# Patient Record
Sex: MALE | Race: WHITE | NOT HISPANIC OR LATINO | Employment: OTHER | URBAN - METROPOLITAN AREA
[De-identification: names, ages, dates, MRNs, and addresses within clinical notes are randomized per-mention and may not be internally consistent; named-entity substitution may affect disease eponyms.]

---

## 2023-09-06 PROBLEM — M47.817 LUMBOSACRAL SPONDYLOSIS: Status: ACTIVE | Noted: 2023-09-06

## 2023-09-06 PROBLEM — H92.13 OTORRHEA OF BOTH EARS: Status: ACTIVE | Noted: 2023-09-06

## 2023-09-06 PROBLEM — I35.0 AORTIC STENOSIS: Status: ACTIVE | Noted: 2022-03-02

## 2023-09-06 PROBLEM — R05.9 COUGH: Status: ACTIVE | Noted: 2023-09-06

## 2023-09-06 PROBLEM — N52.9 INABILITY TO ATTAIN ERECTION: Status: ACTIVE | Noted: 2023-09-06

## 2023-09-06 PROBLEM — M79.606 PAIN OF LOWER EXTREMITY: Status: ACTIVE | Noted: 2023-09-06

## 2023-09-06 PROBLEM — E78.5 HYPERLIPIDEMIA: Status: ACTIVE | Noted: 2023-09-06

## 2023-09-06 PROBLEM — M65.30 TRIGGER FINGER: Status: ACTIVE | Noted: 2021-03-01

## 2023-09-06 PROBLEM — H93.13 BILATERAL TINNITUS: Status: ACTIVE | Noted: 2023-09-06

## 2023-09-06 PROBLEM — E11.9 TYPE 2 DIABETES MELLITUS WITHOUT COMPLICATIONS (MULTI): Status: ACTIVE | Noted: 2018-12-03

## 2023-09-06 PROBLEM — H71.21: Status: ACTIVE | Noted: 2023-09-06

## 2023-09-06 PROBLEM — N40.1 BENIGN PROSTATIC HYPERPLASIA WITH INCOMPLETE BLADDER EMPTYING: Status: ACTIVE | Noted: 2023-09-06

## 2023-09-06 PROBLEM — R39.14 BENIGN PROSTATIC HYPERPLASIA WITH INCOMPLETE BLADDER EMPTYING: Status: ACTIVE | Noted: 2023-09-06

## 2023-09-06 PROBLEM — R06.00 DYSPNEA: Status: ACTIVE | Noted: 2023-09-06

## 2023-09-06 PROBLEM — E78.00 PURE HYPERCHOLESTEROLEMIA, UNSPECIFIED: Status: ACTIVE | Noted: 2018-06-04

## 2023-09-06 PROBLEM — J20.9 ACUTE BRONCHITIS: Status: ACTIVE | Noted: 2023-09-06

## 2023-09-06 PROBLEM — B36.9 OTOMYCOSIS OF LEFT EAR: Status: ACTIVE | Noted: 2023-09-06

## 2023-09-06 PROBLEM — R53.1 ASTHENIA: Status: ACTIVE | Noted: 2023-09-06

## 2023-09-06 PROBLEM — G89.29 OTHER CHRONIC PAIN: Status: ACTIVE | Noted: 2023-09-06

## 2023-09-06 PROBLEM — T81.9XXA COMPLICATION OF SURGICAL PROCEDURE: Status: ACTIVE | Noted: 2023-09-06

## 2023-09-06 PROBLEM — J31.0 CHRONIC RHINITIS: Status: ACTIVE | Noted: 2018-12-04

## 2023-09-06 PROBLEM — H62.42 OTOMYCOSIS OF LEFT EAR: Status: ACTIVE | Noted: 2023-09-06

## 2023-09-06 PROBLEM — H66.92 LEFT CHRONIC OTITIS MEDIA: Status: ACTIVE | Noted: 2023-09-06

## 2023-09-06 PROBLEM — I10 ESSENTIAL (PRIMARY) HYPERTENSION: Status: ACTIVE | Noted: 2018-12-03

## 2023-09-06 PROBLEM — R42 DIZZINESS: Status: ACTIVE | Noted: 2023-09-06

## 2023-09-06 PROBLEM — I25.10 CAD IN NATIVE ARTERY: Status: ACTIVE | Noted: 2018-06-05

## 2023-09-06 PROBLEM — E11.9 DIABETES (MULTI): Status: ACTIVE | Noted: 2018-06-05

## 2023-09-06 PROBLEM — T16.2XXA EAR FOREIGN BODY, LEFT, INITIAL ENCOUNTER: Status: ACTIVE | Noted: 2023-09-06

## 2023-09-06 PROBLEM — I65.29 CAROTID ARTERY CALCIFICATION: Status: ACTIVE | Noted: 2023-09-06

## 2023-09-06 PROBLEM — R94.39 ABNORMAL STRESS TEST: Status: ACTIVE | Noted: 2023-09-06

## 2023-09-06 PROBLEM — H90.6 MIXED CONDUCTIVE AND SENSORINEURAL HEARING LOSS OF BOTH EARS: Status: ACTIVE | Noted: 2023-09-06

## 2023-09-06 PROBLEM — E53.8 VITAMIN B12 DEFICIENCY: Status: ACTIVE | Noted: 2023-09-06

## 2023-09-06 PROBLEM — R50.9 FEVER: Status: ACTIVE | Noted: 2023-09-06

## 2023-09-06 PROBLEM — M1A.9XX0 CHRONIC GOUT, UNSPECIFIED, WITHOUT TOPHUS (TOPHI): Status: ACTIVE | Noted: 2018-06-05

## 2023-09-06 PROBLEM — I73.9 PERIPHERAL ARTERIAL DISEASE (CMS-HCC): Status: ACTIVE | Noted: 2023-09-06

## 2023-09-06 PROBLEM — H70.10 CHRONIC MASTOIDITIS: Status: ACTIVE | Noted: 2023-09-06

## 2023-09-06 PROBLEM — M79.604 BILATERAL LEG PAIN: Status: ACTIVE | Noted: 2020-01-07

## 2023-09-06 PROBLEM — R06.02 EXERTIONAL SHORTNESS OF BREATH: Status: ACTIVE | Noted: 2021-05-11

## 2023-09-06 PROBLEM — I20.9: Status: ACTIVE | Noted: 2023-09-06

## 2023-09-06 PROBLEM — M25.529 PAIN IN ELBOW: Status: ACTIVE | Noted: 2023-09-06

## 2023-09-06 PROBLEM — E66.9 OBESITY: Status: ACTIVE | Noted: 2019-06-18

## 2023-09-06 PROBLEM — H66.91 RIGHT CHRONIC OTITIS MEDIA: Status: ACTIVE | Noted: 2023-09-06

## 2023-09-06 PROBLEM — R20.0 NUMBNESS: Status: ACTIVE | Noted: 2023-09-06

## 2023-09-06 PROBLEM — I73.9 CLAUDICATION (CMS-HCC): Status: ACTIVE | Noted: 2023-09-06

## 2023-09-06 PROBLEM — R35.0 URINARY FREQUENCY: Status: ACTIVE | Noted: 2023-09-06

## 2023-09-06 PROBLEM — M54.10 ACUTE LOW BACK PAIN WITH RADICULAR SYMPTOMS, DURATION LESS THAN 6 WEEKS: Status: ACTIVE | Noted: 2020-01-21

## 2023-09-06 PROBLEM — M79.605 BILATERAL LEG PAIN: Status: ACTIVE | Noted: 2020-01-07

## 2023-09-06 PROBLEM — D17.0 LIPOMA OF NECK: Status: ACTIVE | Noted: 2023-09-06

## 2023-09-06 PROBLEM — J30.2 SEASONAL ALLERGIC RHINITIS: Status: ACTIVE | Noted: 2021-09-02

## 2023-09-06 PROBLEM — G62.9 PERIPHERAL NEUROPATHY: Status: ACTIVE | Noted: 2021-05-11

## 2023-09-06 PROBLEM — J11.1 INFLUENZA-LIKE ILLNESS: Status: ACTIVE | Noted: 2023-09-06

## 2023-09-06 RX ORDER — ISOSORBIDE MONONITRATE 30 MG/1
1 TABLET, EXTENDED RELEASE ORAL DAILY
COMMUNITY
Start: 2022-06-13 | End: 2024-01-08 | Stop reason: SDUPTHER

## 2023-09-06 RX ORDER — AMLODIPINE BESYLATE 10 MG/1
10 TABLET ORAL DAILY
COMMUNITY
Start: 2015-03-26 | End: 2023-12-12 | Stop reason: SDUPTHER

## 2023-09-06 RX ORDER — IBUPROFEN 200 MG
TABLET ORAL
COMMUNITY

## 2023-09-06 RX ORDER — TAMSULOSIN HYDROCHLORIDE 0.4 MG/1
0.4 CAPSULE ORAL DAILY
COMMUNITY
Start: 2018-06-05

## 2023-09-06 RX ORDER — ASPIRIN 81 MG/1
81 TABLET ORAL DAILY
COMMUNITY
Start: 2015-01-26

## 2023-09-06 RX ORDER — NAPROXEN SODIUM 220 MG
220 TABLET ORAL
COMMUNITY

## 2023-09-06 RX ORDER — ACETAMINOPHEN 325 MG/1
1-2 TABLET ORAL
COMMUNITY
Start: 2020-12-02

## 2023-09-06 RX ORDER — METOPROLOL SUCCINATE 100 MG/1
100 TABLET, EXTENDED RELEASE ORAL DAILY
COMMUNITY
Start: 2018-02-05 | End: 2024-01-08 | Stop reason: SDUPTHER

## 2023-09-06 RX ORDER — CLOPIDOGREL BISULFATE 75 MG/1
75 TABLET ORAL DAILY
COMMUNITY
Start: 2018-06-05 | End: 2023-12-13 | Stop reason: SDUPTHER

## 2023-09-06 RX ORDER — ALLOPURINOL 300 MG/1
300 TABLET ORAL DAILY
COMMUNITY
Start: 2020-07-30 | End: 2023-10-23 | Stop reason: SDUPTHER

## 2023-09-06 RX ORDER — GABAPENTIN 600 MG/1
TABLET ORAL
COMMUNITY
Start: 2017-06-12 | End: 2023-11-29

## 2023-09-06 RX ORDER — PSYLLIUM HUSK 3.4 G/5.8G
2 POWDER ORAL
COMMUNITY
Start: 2016-02-01

## 2023-09-06 RX ORDER — GLUCOSAM/CHONDRO/HERB 149/HYAL 750-100 MG
1 TABLET ORAL DAILY
COMMUNITY

## 2023-09-06 RX ORDER — HYDROCORTISONE 1 G/100G
CREAM TOPICAL
COMMUNITY
Start: 2019-08-28

## 2023-09-06 RX ORDER — LOVASTATIN 40 MG/1
40 TABLET ORAL 2 TIMES DAILY
COMMUNITY
End: 2023-10-23 | Stop reason: SDUPTHER

## 2023-09-06 RX ORDER — EVOLOCUMAB 140 MG/ML
140 INJECTION, SOLUTION SUBCUTANEOUS
COMMUNITY
End: 2024-01-08 | Stop reason: SDUPTHER

## 2023-09-06 RX ORDER — GABAPENTIN 300 MG/1
300 CAPSULE ORAL 3 TIMES DAILY
COMMUNITY
End: 2023-10-02

## 2023-09-06 RX ORDER — DOCUSATE SODIUM 100 MG/1
100 CAPSULE, LIQUID FILLED ORAL DAILY
COMMUNITY

## 2023-09-06 RX ORDER — ISOSORBIDE MONONITRATE 60 MG/1
60 TABLET, EXTENDED RELEASE ORAL DAILY
COMMUNITY
Start: 2020-12-02 | End: 2024-01-08 | Stop reason: SDUPTHER

## 2023-09-25 ENCOUNTER — HOSPITAL ENCOUNTER (OUTPATIENT)
Dept: DATA CONVERSION | Facility: HOSPITAL | Age: 83
Discharge: HOME | End: 2023-09-25
Payer: MEDICARE

## 2023-09-25 DIAGNOSIS — E11.9 TYPE 2 DIABETES MELLITUS WITHOUT COMPLICATIONS (MULTI): ICD-10-CM

## 2023-09-25 DIAGNOSIS — E78.00 PURE HYPERCHOLESTEROLEMIA, UNSPECIFIED: ICD-10-CM

## 2023-09-25 DIAGNOSIS — I10 ESSENTIAL (PRIMARY) HYPERTENSION: ICD-10-CM

## 2023-09-25 LAB
ALBUMIN SERPL-MCNC: 4.2 GM/DL (ref 3.5–5)
ALBUMIN/GLOB SERPL: 1.4 RATIO (ref 1.5–3)
ALP BLD-CCNC: 69 U/L (ref 35–125)
ALT SERPL-CCNC: 12 U/L (ref 5–40)
ANION GAP SERPL CALCULATED.3IONS-SCNC: 13 MMOL/L (ref 0–19)
APPEARANCE PLAS: ABNORMAL
AST SERPL-CCNC: 18 U/L (ref 5–40)
BASOPHILS # BLD AUTO: 0.06 K/UL (ref 0–0.22)
BASOPHILS NFR BLD AUTO: 0.8 % (ref 0–1)
BILIRUB SERPL-MCNC: 0.3 MG/DL (ref 0.1–1.2)
BILIRUB UR QL STRIP.AUTO: NEGATIVE
BUN SERPL-MCNC: 15 MG/DL (ref 8–25)
BUN/CREAT SERPL: 13.6 RATIO (ref 8–21)
CALCIUM SERPL-MCNC: 9.5 MG/DL (ref 8.5–10.4)
CHLORIDE SERPL-SCNC: 101 MMOL/L (ref 97–107)
CHOLEST SERPL-MCNC: 91 MG/DL (ref 133–200)
CHOLEST/HDLC SERPL: 1.8 RATIO
CLARITY UR: CLEAR
CO2 SERPL-SCNC: 26 MMOL/L (ref 24–31)
COLOR SPUN FLD: ABNORMAL
COLOR UR: COLORLESS
CREAT SERPL-MCNC: 1.1 MG/DL (ref 0.4–1.6)
CREAT UR-MCNC: 38 MG/DL
DEPRECATED RDW RBC AUTO: 52.8 FL (ref 37–54)
DIFFERENTIAL METHOD BLD: ABNORMAL
EOSINOPHIL # BLD AUTO: 0.22 K/UL (ref 0–0.45)
EOSINOPHIL NFR BLD: 2.9 % (ref 0–3)
ERYTHROCYTE [DISTWIDTH] IN BLOOD BY AUTOMATED COUNT: 14.1 % (ref 11.7–15)
FASTING STATUS PATIENT QL REPORTED: ABNORMAL
GFR SERPL CREATININE-BSD FRML MDRD: 67 ML/MIN/1.73 M2
GLOBULIN SER-MCNC: 3 G/DL (ref 1.9–3.7)
GLUCOSE SERPL-MCNC: 95 MG/DL (ref 65–99)
GLUCOSE UR STRIP.AUTO-MCNC: NEGATIVE MG/DL
HBA1C MFR BLD: 5.7 % (ref 4–6)
HCT VFR BLD AUTO: 38.4 % (ref 41–50)
HDLC SERPL-MCNC: 51 MG/DL
HGB BLD-MCNC: 12.5 GM/DL (ref 13.5–16.5)
HGB UR QL: NEGATIVE
IMM GRANULOCYTES # BLD AUTO: 0.06 K/UL (ref 0–0.1)
KETONES UR QL STRIP.AUTO: NEGATIVE
LDLC SERPL CALC-MCNC: 4 MG/DL (ref 65–130)
LEUKOCYTE ESTERASE UR QL STRIP.AUTO: NEGATIVE
LYMPHOCYTES # BLD AUTO: 1.71 K/UL (ref 1.2–3.2)
LYMPHOCYTES NFR BLD MANUAL: 22.4 % (ref 20–40)
MCH RBC QN AUTO: 33.1 PG (ref 26–34)
MCHC RBC AUTO-ENTMCNC: 32.6 % (ref 31–37)
MCV RBC AUTO: 101.6 FL (ref 80–100)
MICROALBUMIN UR-MCNC: 12 MG/L (ref 0–23)
MICROALBUMIN/CREAT UR: 31.6 MG/G (ref 0–30)
MONOCYTES # BLD AUTO: 0.78 K/UL (ref 0–0.8)
MONOCYTES NFR BLD MANUAL: 10.2 % (ref 0–8)
NEUTROPHILS # BLD AUTO: 4.8 K/UL
NEUTROPHILS # BLD AUTO: 4.8 K/UL (ref 1.8–7.7)
NEUTROPHILS.IMMATURE NFR BLD: 0.8 % (ref 0–1)
NEUTS SEG NFR BLD: 62.9 % (ref 50–70)
NITRITE UR QL STRIP.AUTO: NEGATIVE
NRBC BLD-RTO: 0 /100 WBC
PH UR STRIP.AUTO: 7.5 [PH] (ref 4.6–8)
PLATELET # BLD AUTO: 282 K/UL (ref 150–450)
PMV BLD AUTO: 10.6 CU (ref 7–12.6)
POTASSIUM SERPL-SCNC: 4.6 MMOL/L (ref 3.4–5.1)
PROT SERPL-MCNC: 7.2 G/DL (ref 5.9–7.9)
PROT UR STRIP.AUTO-MCNC: NEGATIVE MG/DL
RBC # BLD AUTO: 3.78 M/UL (ref 4.5–5.5)
REFLEX MICROSCOPIC (UA): NORMAL
SODIUM SERPL-SCNC: 140 MMOL/L (ref 133–145)
SP GR UR STRIP.AUTO: 1.01 (ref 1–1.03)
TRIGL SERPL-MCNC: 179 MG/DL (ref 40–150)
UROBILINOGEN UR QL STRIP.AUTO: NORMAL MG/DL (ref 0–1)
WBC # BLD AUTO: 7.6 K/UL (ref 4.5–11)

## 2023-10-01 PROBLEM — R53.1 ASTHENIA: Status: RESOLVED | Noted: 2023-09-06 | Resolved: 2023-10-01

## 2023-10-01 PROBLEM — J20.9 ACUTE BRONCHITIS: Status: RESOLVED | Noted: 2023-09-06 | Resolved: 2023-10-01

## 2023-10-01 PROBLEM — M54.10 ACUTE LOW BACK PAIN WITH RADICULAR SYMPTOMS, DURATION LESS THAN 6 WEEKS: Status: RESOLVED | Noted: 2020-01-21 | Resolved: 2023-10-01

## 2023-10-02 ENCOUNTER — OFFICE VISIT (OUTPATIENT)
Dept: PRIMARY CARE | Facility: CLINIC | Age: 83
End: 2023-10-02
Payer: MEDICARE

## 2023-10-02 ENCOUNTER — TELEPHONE (OUTPATIENT)
Dept: PRIMARY CARE | Facility: CLINIC | Age: 83
End: 2023-10-02

## 2023-10-02 VITALS
DIASTOLIC BLOOD PRESSURE: 58 MMHG | HEART RATE: 65 BPM | BODY MASS INDEX: 30.53 KG/M2 | HEIGHT: 66 IN | SYSTOLIC BLOOD PRESSURE: 130 MMHG | OXYGEN SATURATION: 96 % | WEIGHT: 190 LBS | RESPIRATION RATE: 18 BRPM

## 2023-10-02 DIAGNOSIS — E78.00 PURE HYPERCHOLESTEROLEMIA, UNSPECIFIED: ICD-10-CM

## 2023-10-02 DIAGNOSIS — I10 ESSENTIAL (PRIMARY) HYPERTENSION: ICD-10-CM

## 2023-10-02 DIAGNOSIS — E11.59 TYPE 2 DIABETES MELLITUS WITH OTHER CIRCULATORY COMPLICATION, WITHOUT LONG-TERM CURRENT USE OF INSULIN (MULTI): ICD-10-CM

## 2023-10-02 DIAGNOSIS — E11.59 TYPE 2 DIABETES MELLITUS WITH OTHER CIRCULATORY COMPLICATION, WITHOUT LONG-TERM CURRENT USE OF INSULIN (MULTI): Primary | ICD-10-CM

## 2023-10-02 DIAGNOSIS — R39.14 BENIGN PROSTATIC HYPERPLASIA WITH INCOMPLETE BLADDER EMPTYING: ICD-10-CM

## 2023-10-02 DIAGNOSIS — N40.1 BENIGN PROSTATIC HYPERPLASIA WITH INCOMPLETE BLADDER EMPTYING: ICD-10-CM

## 2023-10-02 DIAGNOSIS — I25.10 CAD IN NATIVE ARTERY: Primary | ICD-10-CM

## 2023-10-02 DIAGNOSIS — M1A.9XX0 CHRONIC GOUT WITHOUT TOPHUS, UNSPECIFIED CAUSE, UNSPECIFIED SITE: ICD-10-CM

## 2023-10-02 PROCEDURE — 3078F DIAST BP <80 MM HG: CPT | Performed by: FAMILY MEDICINE

## 2023-10-02 PROCEDURE — 1159F MED LIST DOCD IN RCRD: CPT | Performed by: FAMILY MEDICINE

## 2023-10-02 PROCEDURE — G0439 PPPS, SUBSEQ VISIT: HCPCS | Performed by: FAMILY MEDICINE

## 2023-10-02 PROCEDURE — 1126F AMNT PAIN NOTED NONE PRSNT: CPT | Performed by: FAMILY MEDICINE

## 2023-10-02 PROCEDURE — 1036F TOBACCO NON-USER: CPT | Performed by: FAMILY MEDICINE

## 2023-10-02 PROCEDURE — 3075F SYST BP GE 130 - 139MM HG: CPT | Performed by: FAMILY MEDICINE

## 2023-10-02 ASSESSMENT — ENCOUNTER SYMPTOMS
OCCASIONAL FEELINGS OF UNSTEADINESS: 0
DEPRESSION: 0
LOSS OF SENSATION IN FEET: 0

## 2023-10-02 ASSESSMENT — PROMIS GLOBAL HEALTH SCALE
RATE_MENTAL_HEALTH: VERY GOOD
RATE_PHYSICAL_HEALTH: GOOD
EMOTIONAL_PROBLEMS: NEVER
RATE_SOCIAL_SATISFACTION: VERY GOOD
RATE_QUALITY_OF_LIFE: GOOD
CARRYOUT_PHYSICAL_ACTIVITIES: MODERATELY
RATE_GENERAL_HEALTH: GOOD
RATE_AVERAGE_PAIN: 0
RATE_AVERAGE_FATIGUE: MODERATE
CARRYOUT_SOCIAL_ACTIVITIES: GOOD

## 2023-10-02 ASSESSMENT — PAIN SCALES - GENERAL: PAINLEVEL: 0-NO PAIN

## 2023-10-02 ASSESSMENT — PATIENT HEALTH QUESTIONNAIRE - PHQ9
1. LITTLE INTEREST OR PLEASURE IN DOING THINGS: NOT AT ALL
2. FEELING DOWN, DEPRESSED OR HOPELESS: NOT AT ALL
SUM OF ALL RESPONSES TO PHQ9 QUESTIONS 1 AND 2: 0

## 2023-10-02 NOTE — PROGRESS NOTES
"History of Present Illness  1. Presents for Annual Wellness Visit. PMHx, FMHx, SHx, and Social history reviewed and updated in chart. PHQ2 reviewed. Mini-cog test reviewed. Advanced Care planning reviewed.  Self assessment of Health - good  Issues with ADLs - 0  Issues with IADLs - 0  Issues with home safety - 0  He had a colonoscopy/EGD by Dr. Tate in 10/16 which showed diverticulosis and a polyp.   Gastrointestinal consultation in 2021 recommended no further colonoscopy screening exams.     2. FOX is seen today for follow-up of coronary artery disease.  He is S/P CABG on 3/20/15. He is followed by Dr. Yeboah at [facility]. He is on Plavix, ASA, Metoprolol, lisinopril and Amlodipine. Also has aortic stenosis which is being followed by serial ultrasounds by the cardiologist.     3. FOX is seen today for gout.  He takes Allopurinol and has had no acute attacks in a long time.     4. FOX is seen today also for follow up of High cholesterol.  He was on Lovastatin 40 mg but it was changed to Repatha by cardiologist in 2020.Recent LDL is 10.     5. FOX is here also for follow up of benign essential hypertension.  On meds through Dr. Yeboah.     6. FOX is seen today also for BPH.  He is currently being followed by Dr. Lincoln.     7. FOX presents today also for follow up of back pain.  He is S/P surgical repair of spinal stenosis L3-L5 by dr. Mahan in 12/14 and again in 8/20 at [place].     8. FOX is seen also for follow up of Diabetes 2, non insulin requiring.  He is on no medication. Recent A1C is 5.7  Subjective   Patient ID: Fox Devlin is a 83 y.o. male who presents for Annual Exam (Annual wellness exam).    HPI     Review of Systems    Objective   /58 (BP Location: Left arm, Patient Position: Sitting, BP Cuff Size: Large adult)   Pulse 65   Resp 18   Ht 1.676 m (5' 6\")   Wt 86.2 kg (190 lb)   SpO2 96%   BMI 30.67 kg/m²     Physical Exam    Assessment/Plan   Problem " List Items Addressed This Visit             ICD-10-CM       High    Benign prostatic hyperplasia with incomplete bladder emptying N40.1, R39.14     Continue following with urologist.         CAD in native artery - Primary I25.10     Continue current medication and following with Dr. Yeboah.  Recheck here in 6 months.         Diabetes (CMS/HCC) E11.9    Essential (primary) hypertension I10     Continue current medications.  Recheck in 6 months.          Chronic gout, unspecified, without tophus (tophi) M1A.9XX0     Continue current medication.  Follow-up here in 6 months.         Pure hypercholesterolemia, unspecified E78.00     Continuing following with cardiologist and continuing Repatha.             .     Review of Systems  Constitutional Symptoms:  He is negative for fever, headaches, fatigue.   Eyes:  He is negative for loss and blurring of vision.   Ear, Nose, Mouth, Throat:  He is negative for hearing loss, sore throat.   Cardiovascular:  He is negative for chest pain/pressure.   Respiratory:  He is negative for shortness of breath.   Gastrointestinal:  He is negative for nausea, abdominal pain, change in bowel habits, abnormal stools, blood in stool.   Male Genitourinary:  He is negative for frequency, dysuria.   Musculoskeletal:  He is negative for joint pain.   Integumentary:  He is negative for skin trouble or rash.   Neurological:  He is negative for headache.    [1] [1] [1] [1]  Physical Exam  Vitals & Measurements  HR: 77 (Peripheral)  BP: 142/72  SpO2: 100%   HT: 65 in  WT: 188 lb  BMI: 31.28   FOX's hair pattern is normal for patients age and The scalp is normal . The skull is normocephalic, atraumatic. The face is unremarkable with no facial droop.  Eyes: PERRLA, EOMI, no scleral icterus.  Ears, Nose, Mouth, Throat: Both canals are normal.  Tympanic membranes are pearly gray, normal landmarks, good light reflex.  Neck: Inspection of the neck reveals no enlarged nodes, no masses, no JVD,  euthyroid and symmetric, trachea is midline, no mass or JVD . Palpation shows normal pulsation, no adenopathy, or mass.   Carotid auscultation reveals No murmur appreciated with No carotid bruit present.  Chest: Lungs are clear to auscultation and percussion, no respiratory distress.  Cardiovascular: Heart is regular in rhythm. No murmurs or gallops DP pulses are present. Extremities: No edema, clubbing or cyanosis.  Abdomen: Bowel sounds normal, non-tender no mass or organomegaly, no bruit.  Genitourinary: Genital exam: Penis is normal.  Scrotum is normal.  There are no hernias palpable. The prostate is normal with regards to size and consistency.  Lymph Nodes: Palpation of the cervical area are within normal limit.  Musculoskeletal: Extremities: No deformity. No cyanosis, clubbing or edema. Inspection of the lumbar spine reveals normal lumbar lordosis. Left shoulder has no obvious deformity. Palpation reveals No palpable tenderness and Range of motion limited due to pain . Shoulder has no joint instability.  Pain with abduction and extension. Both feet have no evidence of lesions and nails are in good repair. Motor examination reveals normal tone and strength.  Skin: Skin has good turgor.  Neurological: Awake, alert and oriented X3. Cranial nerves II - XII are grossly intact.    [2] [2] [2]  Assessment/Plan  1. Encounter for general adult medical examination without abnormal findings (Z00.00)  Anticipatory guidance given.  He is scheduled get his flu shot next week at [facility].  2. Essential (primary) hypertension (I10)    Continue current medication and following with cardiologist.  3. Pure hypercholesterolemia, unspecified (E78.00)  Continue current medication.  Recheck in 6 months.  4. Type 2 diabetes mellitus without complications (E11.9)    Keep off medication.  Continue low-carbohydrate diet.  Recheck A1 c in 6 months at [facility].  5. Aortic stenosis (I35.0)    Continue follow-up with cardiologist.  6. CAD in native  artery (I25.10)    Continue to follow with cardiologist.

## 2023-10-18 ENCOUNTER — SPECIALTY PHARMACY (OUTPATIENT)
Dept: PHARMACY | Facility: CLINIC | Age: 83
End: 2023-10-18

## 2023-10-23 ENCOUNTER — TELEPHONE (OUTPATIENT)
Dept: PRIMARY CARE | Facility: CLINIC | Age: 83
End: 2023-10-23
Payer: MEDICARE

## 2023-10-23 ENCOUNTER — SPECIALTY PHARMACY (OUTPATIENT)
Dept: PHARMACY | Facility: CLINIC | Age: 83
End: 2023-10-23

## 2023-10-23 ENCOUNTER — PHARMACY VISIT (OUTPATIENT)
Dept: PHARMACY | Facility: CLINIC | Age: 83
End: 2023-10-23
Payer: COMMERCIAL

## 2023-10-23 DIAGNOSIS — M1A.9XX0 CHRONIC GOUT WITHOUT TOPHUS, UNSPECIFIED CAUSE, UNSPECIFIED SITE: ICD-10-CM

## 2023-10-23 DIAGNOSIS — E78.5 HYPERLIPIDEMIA, UNSPECIFIED HYPERLIPIDEMIA TYPE: ICD-10-CM

## 2023-10-23 PROCEDURE — RXMED WILLOW AMBULATORY MEDICATION CHARGE

## 2023-10-23 RX ORDER — ALLOPURINOL 300 MG/1
300 TABLET ORAL DAILY
Qty: 90 TABLET | Refills: 3 | Status: SHIPPED
Start: 2023-10-23 | End: 2024-01-08 | Stop reason: SDUPTHER

## 2023-10-23 RX ORDER — LOVASTATIN 40 MG/1
40 TABLET ORAL 2 TIMES DAILY
Qty: 180 TABLET | Refills: 0 | Status: SHIPPED | OUTPATIENT
Start: 2023-10-23 | End: 2023-10-27 | Stop reason: SDUPTHER

## 2023-10-27 ENCOUNTER — HOSPITAL ENCOUNTER (OUTPATIENT)
Dept: RADIOLOGY | Facility: HOSPITAL | Age: 83
Discharge: HOME | End: 2023-10-27
Payer: MEDICARE

## 2023-10-27 DIAGNOSIS — E78.5 HYPERLIPIDEMIA, UNSPECIFIED HYPERLIPIDEMIA TYPE: Primary | ICD-10-CM

## 2023-10-27 DIAGNOSIS — R91.8 OTHER NONSPECIFIC ABNORMAL FINDING OF LUNG FIELD: ICD-10-CM

## 2023-10-27 PROCEDURE — 71250 CT THORAX DX C-: CPT

## 2023-10-28 RX ORDER — LOVASTATIN 40 MG/1
40 TABLET ORAL 2 TIMES DAILY
Qty: 180 TABLET | Refills: 3 | Status: SHIPPED
Start: 2023-10-28 | End: 2024-01-08 | Stop reason: SDUPTHER

## 2023-11-01 ENCOUNTER — OFFICE VISIT (OUTPATIENT)
Dept: OTOLARYNGOLOGY | Facility: CLINIC | Age: 83
End: 2023-11-01
Payer: MEDICARE

## 2023-11-01 DIAGNOSIS — H61.23 BILATERAL IMPACTED CERUMEN: Primary | ICD-10-CM

## 2023-11-01 PROCEDURE — 69210 REMOVE IMPACTED EAR WAX UNI: CPT | Performed by: OTOLARYNGOLOGY

## 2023-11-01 PROCEDURE — 1126F AMNT PAIN NOTED NONE PRSNT: CPT | Performed by: OTOLARYNGOLOGY

## 2023-11-01 PROCEDURE — 1036F TOBACCO NON-USER: CPT | Performed by: OTOLARYNGOLOGY

## 2023-11-01 PROCEDURE — 3078F DIAST BP <80 MM HG: CPT | Performed by: OTOLARYNGOLOGY

## 2023-11-01 PROCEDURE — 3075F SYST BP GE 130 - 139MM HG: CPT | Performed by: OTOLARYNGOLOGY

## 2023-11-01 PROCEDURE — 1159F MED LIST DOCD IN RCRD: CPT | Performed by: OTOLARYNGOLOGY

## 2023-11-01 NOTE — PROGRESS NOTES
Chief Complaint    Wax removal      History of Present Illness    11.01.2023: Wax cleaning was done bilaterally. TMs intact.    Recommendations:  1- follow up in 6 months  _________________________________________________________________      04.12.2023: On examination, he had bilateral tympanomastoidectomy. He uses hearing aids bilaterally.  Wax cleaning was done bilaterally.    Recommendations:  1- follow up twice a year    ____________________________________________________________________________________________    Mr. Devlin is an 83 yo M. He comes for ear wax. He uses hearing aids.   He has a tinnitus in his ears like a wind, which has been going on for about a year.    On examination, he had bilateral tympanomastoidectomy.  Cleaning was done bilaterally.  No infection    Recommendations:  1- follow up twice a year  2- otc tinnitus meds       Review of Systems    Eyes: itching of the eyes.   ENMT: difficulty with hearing, snoring and postnasal drip.   Cardiovascular: history of murmur.   Respiratory: shortness of breath, coughing up sputum and cough.   Musculoskeletal: arthralgias and myalgias.   Integumentary: dry skin.   Neurological: weakness.   Psychiatric: sleep disturbances.   Endocrine: muscle weakness.   All other systems have been reviewed and are negative for complaint.      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 The left      main is mildly narrowed. The LAD has mild diffuse proximal      disease. The mid LAD is narrowed up to 50%. The more distal      LAD is unremarkable. The large first diagonal has a 60%      elongated proximal lesion. The LCX has a 70% proximal lesion      just before the first large OM branch. The dominant RCA has      mild diffuse proximal disease. The mid RCA has a 70% lesion.      The more distal RCA is free of significant stenoses.      -  Left ventriculography showed normal wall motion and a normal      ejection fraction of 70%. There is no mitral regurgitation.      -  Abdominal aortography for hyperkalemia and renal insufficiency      showed essentially normal renal arteries bilaterally      Status post repeat catheterization in 3/2015: This showed severe native three-vessel      coronary artery disease with significant calcification.  He underwent urgent CABG.      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  There was more limited disease more distally.       He underwent a kissing stent technique with two 38 mm Atrium covered stents      in  the iliacs and into the distal abdominal aorta.  The left stent was post      dilated with a 10 mm balloon, and the right stent was post dilated with a 9 mm balloon      with an excellent angiographic result.   · Right chronic otitis media (382.9) (H66.91)   · Urinary frequency (788.41) (R35.0)   · Vitamin B12 deficiency (266.2) (E53.8)    Past Medical History   · Carotid artery calcification (433.10) (I65.29)   · right CEA approx 2003   · Coronary atherosclerosis (414.00) (I25.10)   · cath 3/2005: There is two vessel coronary artery disease with diffuse coronary      artery calcification in this right dominant system. The left      main is mildly narrowed. The LAD has mild diffuse proximal      disease. The mid LAD is narrowed up to 50%. The more distal      LAD is unremarkable. The large first diagonal has a 60%      elongated proximal lesion. The LCX has a 70% proximal lesion      just before the first large OM branch. The dominant RCA has      mild diffuse proximal disease. The mid RCA has a 70% lesion.      The more distal RCA is free of significant stenoses.      -  Left ventriculography showed normal wall motion and a normal      ejection fraction of 70%. There is no mitral regurgitation.      -  Abdominal aortography for hyperkalemia and renal insufficiency      showed essentially normal renal arteries bilaterally      Status post repeat catheterization in 3/2015: This showed severe native three-vessel      coronary artery disease with significant calcification.  He underwent urgent CABG.      S/P LCx atherectomy/stent   · H/O bladder problems (V13.09) (Z87.448)   · Hyperlipidemia (272.4) (E78.5)   · Hypertension (401.9) (I10)   · History of Otomycosis of left ear (111.8,380.15) (B36.9,H62.42)   · History of Prostate disorder (602.9) (N42.9)    Surgical History   · History of CABG   · CABG x 4: 3/20/2015: Dr. Casandra REDDY - LAD, SVG - OM1, SVG - OM2, SVG - to distal RCA   · History of Previous Stent  Placement   · History of PTA Carotid Artery    Family History   · Family history of hypertension (V17.49) (Z82.49)   · Family history of malignant neoplasm (V16.9) (Z80.9)   · Family history of CABG   · Family history of coronary artery disease (V17.3) (Z82.49)    Social History   · Former smoker (V15.82) (Z87.891)   ·    · Occasional alcohol use    Allergies   · No Known Allergies  Recorded By: Christine Kendrick; 11/25/2013 8:51:25 AM    Current Meds    Medication Name Instruction   Aleve -25 MG Oral Tablet USE AS DIRECTED AT BEDTIME FOR SLEEP   Allopurinol 300 MG Oral Tablet TAKE 1 TABLET DAILY.   amLODIPine Besylate 10 MG Oral Tablet TAKE 1 TABLET BY MOUTH DAILY   Aspirin EC 81 MG Oral Tablet Delayed Release TAKE 1 TABLET DAILY AS DIRECTED.   Clopidogrel Bisulfate 75 MG Oral Tablet TAKE 1 TABLET BY MOUTH EVERY DAY   Cortizone-10 Intensive Healing 1 % External Cream USE AS DIRECTED   Gabapentin 600 MG Oral Tablet TAKE 1 TABLET TWICE DAILY.   Gold Corea Medicated Anti Itch 1-0.5-5 % External Lotion USE AS DIRECTED   Isosorbide Mononitrate ER 30 MG Oral Tablet Extended Release 24 Hour TAKE 1 TABLET BY MOUTH EVERY DAY**TAKE WITH 60MG ER TABLET FOR 90MG DAILY**   Isosorbide Mononitrate ER 60 MG Oral Tablet Extended Release 24 Hour TAKE 1 TABLET BY MOUTH EVERY DAY AS DIRECTED   Lovastatin 40 MG Oral Tablet Take 1 tablet by mouth twice a day (dose increase)   Metamucil 48.57 % Oral Powder TAKE 2 SCOOP Daily   Metoprolol Succinate  MG Oral Tablet Extended Release 24 Hour TAKE 1 TABLET BY MOUTH EVERYDAY AT BEDTIME   Multi-Day TABS TAKE 1 TABLET DAILY.   Repatha SureClick 140 MG/ML Subcutaneous Solution Auto-injector Inject 140mg (1 pen) under the skin every 2 weeks, as directed   Tylenol 325 MG Oral Tablet Take 1 to 2 tablets every 4-6 hours as needed   Unspecified Homeopathic OMEGA FISH OIL 1600MG 2 CAPSULES DAILY     Physical Exam  (old exam note)  General appearance: Healthy-appearing, well-nourished,  well groomed, in no acute distress.     Head and Face: Atraumatic with no masses, lesions, or scarring.     Salivary glands: No tenderness of the parotid glands or parotid masses.     No tenderness of the submandibular glands or submandibular masses.     Facial strength: Normal strength and symmetry, no synkinesis or facial tic.     Eyes: Conjunctivas look non-hyperemic bilaterally    Ears: Bilaterally wax was cleaned, bilateral tympanomastoidectomy (+)     Nose: Mucosa looks normal. No purulent discharge.     Oral Cavity/Mouth: Lips and tongue look normal.     Throat: No postnasal discharge. No tonsil hypertrophy. No hyperemia.    Neck: Symmetrical, trachea midline.     Pulmonary: Normal respiratory effort.     Lymphatic: No palpable pathologic lymph nodes at neck.     Neurological/Psychiatric: Orientation to person, place, and time: Normal.   Mood and affect: Normal.     Extremities: No clubbing.       Procedure  EAR WAX REMOVAL 11.01.2023  Patient had bilateral ear wax. Using microscope, hooks and suction cleaning was done. Patient tolerated the procedure well.     Assessment and Plan:    11.01.2023: Wax cleaning was done bilaterally. TMs intact.    Recommendations:  1- follow up in 6 months  _________________________________________________________________       Signatures  Electronically signed by : Tiana Hurtado MD; Apr 12 2023 12:57PM EST (Author)

## 2023-11-21 ENCOUNTER — SPECIALTY PHARMACY (OUTPATIENT)
Dept: PHARMACY | Facility: CLINIC | Age: 83
End: 2023-11-21

## 2023-11-21 ENCOUNTER — PHARMACY VISIT (OUTPATIENT)
Dept: PHARMACY | Facility: CLINIC | Age: 83
End: 2023-11-21
Payer: COMMERCIAL

## 2023-11-21 PROCEDURE — RXMED WILLOW AMBULATORY MEDICATION CHARGE

## 2023-11-29 DIAGNOSIS — G62.89 OTHER POLYNEUROPATHY: Primary | ICD-10-CM

## 2023-11-29 RX ORDER — GABAPENTIN 600 MG/1
600 TABLET ORAL 3 TIMES DAILY
Qty: 270 TABLET | Refills: 0 | Status: SHIPPED
Start: 2023-11-29 | End: 2024-01-08 | Stop reason: SDUPTHER

## 2023-12-12 ENCOUNTER — TELEPHONE (OUTPATIENT)
Dept: PRIMARY CARE | Facility: CLINIC | Age: 83
End: 2023-12-12
Payer: MEDICARE

## 2023-12-12 DIAGNOSIS — I10 ESSENTIAL (PRIMARY) HYPERTENSION: ICD-10-CM

## 2023-12-12 RX ORDER — AMLODIPINE BESYLATE 10 MG/1
10 TABLET ORAL DAILY
Qty: 90 TABLET | Refills: 3 | Status: SHIPPED
Start: 2023-12-12 | End: 2024-01-08 | Stop reason: SDUPTHER

## 2023-12-12 NOTE — TELEPHONE ENCOUNTER
Rx Refill Request Telephone Encounter    Name:  Herminio Devlin  :  [id number]  Medication Name:   amlodipine besylate 10 mg    Specific Pharmacy location:  [facility]  Best number to reach patient:  [phone] (Pharmacy)

## 2023-12-13 DIAGNOSIS — I25.10 CAD IN NATIVE ARTERY: Primary | ICD-10-CM

## 2023-12-13 PROCEDURE — RXMED WILLOW AMBULATORY MEDICATION CHARGE

## 2023-12-13 RX ORDER — CLOPIDOGREL BISULFATE 75 MG/1
75 TABLET ORAL DAILY
Qty: 90 TABLET | Refills: 3 | Status: SHIPPED
Start: 2023-12-13 | End: 2024-01-08 | Stop reason: SDUPTHER

## 2023-12-18 ENCOUNTER — PHARMACY VISIT (OUTPATIENT)
Dept: PHARMACY | Facility: CLINIC | Age: 83
End: 2023-12-18
Payer: COMMERCIAL

## 2024-01-08 ENCOUNTER — TELEPHONE (OUTPATIENT)
Dept: PRIMARY CARE | Facility: CLINIC | Age: 84
End: 2024-01-08
Payer: MEDICARE

## 2024-01-08 DIAGNOSIS — M1A.9XX0 CHRONIC GOUT WITHOUT TOPHUS, UNSPECIFIED CAUSE, UNSPECIFIED SITE: ICD-10-CM

## 2024-01-08 DIAGNOSIS — I10 ESSENTIAL (PRIMARY) HYPERTENSION: ICD-10-CM

## 2024-01-08 DIAGNOSIS — E78.5 HYPERLIPIDEMIA: ICD-10-CM

## 2024-01-08 DIAGNOSIS — I25.10 CAD (CORONARY ARTERY DISEASE): ICD-10-CM

## 2024-01-08 DIAGNOSIS — E78.5 HYPERLIPIDEMIA, UNSPECIFIED HYPERLIPIDEMIA TYPE: ICD-10-CM

## 2024-01-08 DIAGNOSIS — G62.89 OTHER POLYNEUROPATHY: ICD-10-CM

## 2024-01-08 DIAGNOSIS — I35.0 AORTIC STENOSIS: ICD-10-CM

## 2024-01-08 DIAGNOSIS — I25.10 CAD IN NATIVE ARTERY: Primary | ICD-10-CM

## 2024-01-08 RX ORDER — LOVASTATIN 40 MG/1
40 TABLET ORAL 2 TIMES DAILY
Qty: 180 TABLET | Refills: 3 | Status: SHIPPED | OUTPATIENT
Start: 2024-01-08 | End: 2025-01-07

## 2024-01-08 RX ORDER — EVOLOCUMAB 140 MG/ML
140 INJECTION, SOLUTION SUBCUTANEOUS
Qty: 6 EACH | Refills: 3 | Status: SHIPPED | OUTPATIENT
Start: 2024-01-08 | End: 2024-03-11 | Stop reason: WASHOUT

## 2024-01-08 RX ORDER — ALLOPURINOL 300 MG/1
300 TABLET ORAL DAILY
Qty: 90 TABLET | Refills: 3 | Status: SHIPPED | OUTPATIENT
Start: 2024-01-08 | End: 2025-01-07

## 2024-01-08 RX ORDER — AMLODIPINE BESYLATE 10 MG/1
10 TABLET ORAL DAILY
Qty: 90 TABLET | Refills: 3 | Status: SHIPPED | OUTPATIENT
Start: 2024-01-08 | End: 2025-01-07

## 2024-01-08 RX ORDER — ISOSORBIDE MONONITRATE 60 MG/1
60 TABLET, EXTENDED RELEASE ORAL DAILY
Qty: 90 TABLET | Refills: 3 | Status: SHIPPED | OUTPATIENT
Start: 2024-01-08 | End: 2025-01-07

## 2024-01-08 RX ORDER — ISOSORBIDE MONONITRATE 30 MG/1
30 TABLET, EXTENDED RELEASE ORAL DAILY
Qty: 90 TABLET | Refills: 3 | Status: SHIPPED | OUTPATIENT
Start: 2024-01-08 | End: 2025-01-07

## 2024-01-08 RX ORDER — METOPROLOL SUCCINATE 100 MG/1
100 TABLET, EXTENDED RELEASE ORAL DAILY
Qty: 90 TABLET | Refills: 3 | Status: SHIPPED | OUTPATIENT
Start: 2024-01-08 | End: 2025-01-07

## 2024-01-08 RX ORDER — CLOPIDOGREL BISULFATE 75 MG/1
75 TABLET ORAL DAILY
Qty: 90 TABLET | Refills: 3 | Status: SHIPPED | OUTPATIENT
Start: 2024-01-08 | End: 2025-01-07

## 2024-01-08 RX ORDER — GABAPENTIN 600 MG/1
600 TABLET ORAL 3 TIMES DAILY
Qty: 270 TABLET | Refills: 0 | Status: SHIPPED | OUTPATIENT
Start: 2024-01-08 | End: 2024-04-26 | Stop reason: SDUPTHER

## 2024-01-10 ENCOUNTER — SPECIALTY PHARMACY (OUTPATIENT)
Dept: PHARMACY | Facility: CLINIC | Age: 84
End: 2024-01-10

## 2024-01-10 DIAGNOSIS — E78.00 PURE HYPERCHOLESTEROLEMIA, UNSPECIFIED: Primary | ICD-10-CM

## 2024-01-10 RX ORDER — EVOLOCUMAB 140 MG/ML
INJECTION, SOLUTION SUBCUTANEOUS
Qty: 2 ML | Refills: 3 | Status: SHIPPED | OUTPATIENT
Start: 2024-01-10 | End: 2024-04-16 | Stop reason: SDUPTHER

## 2024-01-12 PROCEDURE — RXMED WILLOW AMBULATORY MEDICATION CHARGE

## 2024-01-13 ENCOUNTER — PHARMACY VISIT (OUTPATIENT)
Dept: PHARMACY | Facility: CLINIC | Age: 84
End: 2024-01-13
Payer: MEDICARE

## 2024-01-15 ENCOUNTER — HOSPITAL ENCOUNTER (OUTPATIENT)
Dept: RADIOLOGY | Facility: HOSPITAL | Age: 84
Discharge: HOME | End: 2024-01-15
Payer: MEDICARE

## 2024-01-15 DIAGNOSIS — M25.551 ACUTE RIGHT HIP PAIN: ICD-10-CM

## 2024-01-15 PROCEDURE — 73502 X-RAY EXAM HIP UNI 2-3 VIEWS: CPT | Mod: RT

## 2024-01-16 ENCOUNTER — OFFICE VISIT (OUTPATIENT)
Dept: ORTHOPEDIC SURGERY | Facility: CLINIC | Age: 84
End: 2024-01-16
Payer: MEDICARE

## 2024-01-16 DIAGNOSIS — Z98.890 BACK PAIN WITH HISTORY OF SPINAL SURGERY: ICD-10-CM

## 2024-01-16 DIAGNOSIS — M25.552 HIP PAIN, BILATERAL: ICD-10-CM

## 2024-01-16 DIAGNOSIS — M54.9 BACK PAIN WITH HISTORY OF SPINAL SURGERY: ICD-10-CM

## 2024-01-16 DIAGNOSIS — M16.0 ARTHRITIS OF BOTH HIPS: ICD-10-CM

## 2024-01-16 DIAGNOSIS — M25.551 HIP PAIN, BILATERAL: ICD-10-CM

## 2024-01-16 DIAGNOSIS — M54.16 LUMBAR RADICULOPATHY: Primary | ICD-10-CM

## 2024-01-16 PROCEDURE — 99213 OFFICE O/P EST LOW 20 MIN: CPT | Performed by: ORTHOPAEDIC SURGERY

## 2024-01-16 PROCEDURE — 1036F TOBACCO NON-USER: CPT | Performed by: ORTHOPAEDIC SURGERY

## 2024-01-16 PROCEDURE — 20610 DRAIN/INJ JOINT/BURSA W/O US: CPT | Performed by: ORTHOPAEDIC SURGERY

## 2024-01-16 PROCEDURE — 1126F AMNT PAIN NOTED NONE PRSNT: CPT | Performed by: ORTHOPAEDIC SURGERY

## 2024-01-16 PROCEDURE — 1159F MED LIST DOCD IN RCRD: CPT | Performed by: ORTHOPAEDIC SURGERY

## 2024-01-16 RX ORDER — BUPIVACAINE HYDROCHLORIDE 2.5 MG/ML
1 INJECTION, SOLUTION INFILTRATION; PERINEURAL
Status: COMPLETED | OUTPATIENT
Start: 2024-01-16 | End: 2024-01-16

## 2024-01-16 RX ORDER — TRIAMCINOLONE ACETONIDE 40 MG/ML
40 INJECTION, SUSPENSION INTRA-ARTICULAR; INTRAMUSCULAR
Status: COMPLETED | OUTPATIENT
Start: 2024-01-16 | End: 2024-01-16

## 2024-01-16 RX ADMIN — TRIAMCINOLONE ACETONIDE 40 MG: 40 INJECTION, SUSPENSION INTRA-ARTICULAR; INTRAMUSCULAR at 10:57

## 2024-01-16 RX ADMIN — BUPIVACAINE HYDROCHLORIDE 1 ML: 2.5 INJECTION, SOLUTION INFILTRATION; PERINEURAL at 10:57

## 2024-01-16 ASSESSMENT — PAIN DESCRIPTION - DESCRIPTORS
DESCRIPTORS: ACHING;DISCOMFORT
DESCRIPTORS: DISCOMFORT;ACHING

## 2024-01-16 ASSESSMENT — PAIN SCALES - GENERAL
PAINLEVEL_OUTOF10: 7
PAINLEVEL_OUTOF10: 7

## 2024-01-16 ASSESSMENT — PATIENT HEALTH QUESTIONNAIRE - PHQ9
1. LITTLE INTEREST OR PLEASURE IN DOING THINGS: NOT AT ALL
2. FEELING DOWN, DEPRESSED OR HOPELESS: NOT AT ALL
SUM OF ALL RESPONSES TO PHQ9 QUESTIONS 1 & 2: 0

## 2024-01-16 ASSESSMENT — PAIN - FUNCTIONAL ASSESSMENT: PAIN_FUNCTIONAL_ASSESSMENT: 0-10

## 2024-01-16 NOTE — PROGRESS NOTES
Subjective    Patient ID: Herminio Devlin is a 83 y.o. male.    Chief Complaint: Follow-up and Pain of the Left Hip (REQUESTING INJECTION, LAST TRIGGER POINT INJECTION 7/2023), Pain of the Right Hip (XRAYS ONLINE), and Pain of the Lower Back (LUMBAR RADICULAR PAIN, H/O SURGERY)         HPI  This 83-year-old male is here complaining of bilateral hip pain left greater than right.  He points to the posterior lateral aspect of both hips as being the source of the pain.  He denies any significant groin pain.  He does have a history of lumbar spine surgery several years ago which did help him at that time.  He states that he continues to be active on a treadmill and feels that he gets progressive hip pain the longer that he is walking.  He has had a previous trigger point injection which helped him significantly and he is here requesting trigger point injections bilaterally.  Objective   Ortho Exam  On physical examination he is noted to be a well-developed male.  He walks with no assistive devices.  Examination of his hips reveals there to be no deformities.  He has pain to palpation over the posterior lateral aspect of both greater trochanters.  He has good flexion and minimal restriction of internal and external rotation bilaterally.  He does not have any reproduction of his pain with hip rotation.  There is no significant motor or sensory deficit noted to either lower extremity.  Image Results:  XR hip right with pelvis when performed 2 or 3 views  Narrative: Interpreted By:  Herminio Butler,   STUDY:  XR HIP RIGHT WITH PELVIS WHEN PERFORMED 2 OR 3 VIEWS      INDICATION:  Signs/Symptoms:right hip pain, patient has known left hip arthritis      COMPARISON:  Left hip series dated October 17, 2022      ACCESSION NUMBER(S):  [id number]      ORDERING CLINICIAN:  NATHALY WHEAT      TECHNIQUE:  Views: AP and Lateral of the right hip and AP view of the pelvis      FINDINGS:  RESULT: There is no evidence for fracture or  dislocation. There is  joint space narrowing of the bilateral hips with osteophyte formation  of the bilateral acetabula. There are atherosclerotic calcifications  of the bilateral superficial femoral arteries. Pedicle screws and  posterior fusion rods are noted with fusion hardware of the L4 and L5  levels.      Impression: Osteoarthritis of the bilateral hips.      Atherosclerosis.                                  Signed by: Herminio Butler 1/15/2024 3:11 PM  Dictation workstation:   [id number]      Assessment/Plan   Encounter Diagnoses:  Lumbar radiculopathy    Arthritis of both hips    Hip pain, bilateral    Back pain with history of spinal surgery  I discussed his clinical findings as well as x-ray findings with him.  He does have some mild degenerative changes in his hip joints bilaterally but I suspect that his pain is more from his lumbar spine and nerve pain.  He has done well with trigger point injections in the past and he is requesting repeat injections.  I agreed.  Patient ID: Herminio Devlin is a 83 y.o. male.    L Inj/Asp: bilateral iliopsoas bursa on 1/16/2024 10:57 AM  Indications: pain  Details: 25 G needle, lateral approach  Medications (Right): 40 mg triamcinolone acetonide 40 mg/mL; 1 mL BUPivacaine HCl 0.25 % (2.5 mg/mL)  Medications (Left): 40 mg triamcinolone acetonide 40 mg/mL; 1 mL BUPivacaine HCl 0.25 % (2.5 mg/mL)  Outcome: tolerated well, no immediate complications  Procedure, treatment alternatives, risks and benefits explained, specific risks discussed. Consent was given by the patient. Immediately prior to procedure a time out was called to verify the correct patient, procedure, equipment, support staff and site/side marked as required. Patient was prepped and draped in the usual sterile fashion.       He is to resume low impact exercise as tolerated and to return on an as-needed basis.

## 2024-01-17 ENCOUNTER — SPECIALTY PHARMACY (OUTPATIENT)
Dept: PHARMACY | Facility: CLINIC | Age: 84
End: 2024-01-17

## 2024-01-17 ENCOUNTER — TELEMEDICINE CLINICAL SUPPORT (OUTPATIENT)
Dept: PHARMACY | Facility: HOSPITAL | Age: 84
End: 2024-01-17
Payer: MEDICARE

## 2024-01-17 NOTE — PROGRESS NOTES
[facility] Clinical Note  Repatha Reassessment call    Herminio Devlin is a 83 y.o. male, who is on the specialty pharmacy service for management of: Hyperlipidemia Core with status of: (Enrolled). He consented to pharmacist outreach and collaborative management of Repatha with Dr. Yeboah.     Herminio was contacted on 1/17/2024 at 12:43 PM for a virtual pharmacy visit with encounter number [id number] from the [id number]  [address]  [address]  [facility] [address]  Dept: [phone]  Dept Fax: [phone]  Loc: [phone]    Herminio was offered a Telemedicine Video visit or Telephone visit.  Herminio chose a Telephone visit, which was performed.    Herminio is taking: Repatha Sureclick 140mg under the skin every 2 weeks. This patient's care will be continued with the referring prescriber Herminio Yeboah MD.    General Assessment  Herminio continues on Repatha as prescribed with no reported side effects, efficacy concerns, or adherence barriers at this time. He plans to call in with his processing information with his insurance to make sure we have updated info for the new year.      Impression/Plan  IMPRESSION/PLAN:  Is patient high risk (potential patients:  pregnancy, geriatric, pediatric)?  Yes- geriatric with PMH significant for CAD, PAD, HLD, HTN  Is laboratory follow-up needed? Upon provider follow-up  Is a clinical intervention needed? No  Next reassessment date? Approx. 7/17/24  Additional comments:     Refer to the encounter summary report for documentation details about patient counseling and education.      Medication Adherence  The importance of adherence was discussed with the patient and they were advised to take the medication as prescribed by their provider. Herminio was encouraged to call his physician's office if they have a question regarding a missed dose.        Patient advised to contact  the pharmacy if there are any changes to her medication list, including prescriptions, OTC medications, herbal products, or supplements. Patient was advised of [facility]’s dispensing process, refill timeline, contact information ([phone]), and patient management follow up. Patient confirmed understanding of education conducted during assessment. All patient questions and concerns were addressed to the best of my ability. Patient was encouraged to contact the specialty pharmacy with any questions or concerns.    Confirmed follow-up outreaches are properly scheduled. Reviewed goals of therapy in the program targets.    Zahra Zuleta, PharmD

## 2024-01-22 ENCOUNTER — TELEPHONE (OUTPATIENT)
Dept: CARDIOLOGY | Facility: CLINIC | Age: 84
End: 2024-01-22
Payer: MEDICARE

## 2024-01-24 DIAGNOSIS — I48.0 PAROXYSMAL ATRIAL FIBRILLATION (MULTI): Primary | ICD-10-CM

## 2024-02-01 ENCOUNTER — TELEMEDICINE (OUTPATIENT)
Dept: PHARMACY | Facility: HOSPITAL | Age: 84
End: 2024-02-01
Payer: MEDICARE

## 2024-02-01 DIAGNOSIS — I48.0 PAROXYSMAL ATRIAL FIBRILLATION (MULTI): ICD-10-CM

## 2024-02-01 NOTE — Clinical Note
Jeromy Yeboah! It looks like the patient's new insurance requires a prior authorization for Repatha. Therefore, I have contacted the  Speciality team (whom he is already established with) to enter PA and continue to have him on the  PAP program. If there is anything else you need from me, please let me know.

## 2024-02-01 NOTE — PROGRESS NOTES
"[facility]  Herminio Devlin is a 83 y.o. male was referred to Clinical Pharmacy Team for hypercholesterolemia/hyperlipidemia management.     Referring Provider: Dr. Yeboah    _________________________________________________________________________________________  PHARMACY ASSESSMENT    Affordability/Accessibility: Patient states insurance no longer covers Repatha.   Adherence/Organization: 140 mg q14day   Adverse Effects: NA - He is very happy with the results of the medication and does not mind giving himself the injection.     RELEVANT LAB RESULTS  Lab Results   Component Value Date    BILITOT 0.3 09/25/2023    CALCIUM 9.5 09/25/2023    CO2 26 09/25/2023     09/25/2023    CREATININE 1.1 09/25/2023    GLUCOSE 95 09/25/2023    ALKPHOS 69 09/25/2023    K 4.6 09/25/2023    PROT 7.2 09/25/2023     09/25/2023    AST 18 09/25/2023    ALT 12 09/25/2023    BUN 15 09/25/2023    ANIONGAP 13 09/25/2023    PHOS 4.2 10/21/2021    ALBUMIN 4.2 09/25/2023     Lab Results   Component Value Date    TRIG 179 (H) 09/25/2023    CHOL 91 (L) 09/25/2023    LDLCALC 4 (L) 09/25/2023    HDL 51 09/25/2023     No results found for: \"BMCBC\", \"CBCDIF\"       DRUG INTERACTIONS  - No  _________________________________________________________________________________________    HYPERCHOLESTEROLEMIA ASSESSMENT    RECENT LIPID PANEL (DATE): 9/25/2023  Lab Results   Component Value Date    TRIG 179 (H) 09/25/2023    CHOL 91 (L) 09/25/2023    LDLCALC 4 (L) 09/25/2023    HDL 51 09/25/2023          ASCVD SCORE: The ASCVD Risk score (Ellis WATTS, et al., 2019) failed to calculate for the following reasons:    The 2019 ASCVD risk score is only valid for ages 40 to 79  Coronary Heart Disease (MI, angina, coronary artery stenosis): Yes   History of ischemic stroke? No   History of carotid artery stenosis? Yes - calcification   Peripheral artery disease? Yes   ASCVD RISK FACTORS:    CKD? No   Diabetes? Yes   HTN? " Yes   Persistently elevated LDL? No   Elevated triglycerides? No   Inflammatory diseases (rheumatoid arthritis, psoriasis, HIV)? No    CURRENT PHARMACOTHERAPY  - Statin? Yes, describe: lovastatin  - Ezetimibe? No  - PCSK9-I? Yes, describe: Repatha 140 mg [id number]  - Other lipid lowering agents? Yes, describe: fish oil, Metamucil     HISTORICAL PHARMACOTHERAPY  - Patient has a significant past medical history including coronary atherosclerosis, CABG, aortic stenosis, CAD, DLD, HTN, and PAD.     PATIENT EDUCATION/DISCUSSION:  - Counseled patient on MOA, expectations, side effects, duration of therapy, contraindications, administration, and monitoring parameters  - Answered all patient questions and concerns  _________________________________________________________________________________________    RECOMMENDATIONS/PLAN    1. Patient's new insurance requires a prior authorization for Repatha. Therefore, I have contacted the  Speciality team (whom he is already established with) to enter PA and continue to have him on the  PAP program.   2. Patient is to continue Repatha as prescribed    Next Cardiology Appointment: 3/11/2024  Clinical Pharmacist follow up: PRN  Type of Encounter: Sampson Morgan PharmD    Verbal consent to manage patient's drug therapy was obtained from the patient . They were informed they may decline to participate or withdraw from participation in pharmacy services at any time.    Continue all meds under the continuation of care with the referring provider and clinical pharmacy team.

## 2024-02-05 ENCOUNTER — OFFICE VISIT (OUTPATIENT)
Dept: PRIMARY CARE | Facility: CLINIC | Age: 84
End: 2024-02-05
Payer: MEDICARE

## 2024-02-05 VITALS
DIASTOLIC BLOOD PRESSURE: 62 MMHG | BODY MASS INDEX: 29.86 KG/M2 | OXYGEN SATURATION: 96 % | HEART RATE: 76 BPM | WEIGHT: 185 LBS | RESPIRATION RATE: 20 BRPM | SYSTOLIC BLOOD PRESSURE: 122 MMHG | TEMPERATURE: 97.7 F

## 2024-02-05 DIAGNOSIS — R42 VERTIGO: Primary | ICD-10-CM

## 2024-02-05 PROCEDURE — 1126F AMNT PAIN NOTED NONE PRSNT: CPT | Performed by: FAMILY MEDICINE

## 2024-02-05 PROCEDURE — 1036F TOBACCO NON-USER: CPT | Performed by: FAMILY MEDICINE

## 2024-02-05 PROCEDURE — 1159F MED LIST DOCD IN RCRD: CPT | Performed by: FAMILY MEDICINE

## 2024-02-05 PROCEDURE — 1160F RVW MEDS BY RX/DR IN RCRD: CPT | Performed by: FAMILY MEDICINE

## 2024-02-05 PROCEDURE — 3078F DIAST BP <80 MM HG: CPT | Performed by: FAMILY MEDICINE

## 2024-02-05 PROCEDURE — 99214 OFFICE O/P EST MOD 30 MIN: CPT | Performed by: FAMILY MEDICINE

## 2024-02-05 PROCEDURE — RXMED WILLOW AMBULATORY MEDICATION CHARGE

## 2024-02-05 PROCEDURE — 3074F SYST BP LT 130 MM HG: CPT | Performed by: FAMILY MEDICINE

## 2024-02-05 ASSESSMENT — PAIN SCALES - GENERAL: PAINLEVEL: 0-NO PAIN

## 2024-02-05 ASSESSMENT — ENCOUNTER SYMPTOMS
OCCASIONAL FEELINGS OF UNSTEADINESS: 0
DEPRESSION: 0
LOSS OF SENSATION IN FEET: 0

## 2024-02-05 NOTE — ASSESSMENT & PLAN NOTE
Symptoms and exam are consistent with classic laryngitis.  We discussed avoiding the facts.  I told him that the Epley maneuver may be beneficial and if it is he will notice this within the next 3 days.  If no improvement over the next several weeks he is to let me know.  If symptoms worsen before then he is to let me know.

## 2024-02-05 NOTE — PROGRESS NOTES
Subjective   Patient ID: Herminio Devlin is a 83 y.o. male who presents for Dizziness (X 2 wks).    HPI   He is here for intermittent dizziness. This being on for the past week and a half.  It happens mostly when he is lying in bed and turns his head to the left.  He has a room spinning sensation.  Denies any syncopal episode or feeling as if he is going to faint.  The symptoms do not seem to occur too much when he is ambulating however he states he feels slightly unsteady during this time.  He has had no history of vertigo.  Review of Systems  Denies headache, blurred vision, chest pain, shortness of breath, nausea or vomiting, change in bowel habits or leg pain or swelling.    Objective   /62 (BP Location: Left arm, Patient Position: Sitting, BP Cuff Size: Large adult)   Pulse 76   Temp 36.5 °C (97.7 °F)   Resp 20   Wt 83.9 kg (185 lb)   SpO2 96%   BMI 29.86 kg/m²     Physical Exam  Vitals and nurse's notes reviewed  General: no acute distress  HEENT: Normal  Neck: Supple  Lungs: Clear  Cardio: RRR w/o murmur  Extremities: No edema, no calf tenderness  Neuro: Alert and oriented with no focal deficits. The Epley maneuver is performed.  He had Recurrence of his symptoms during the Epley maneuver mostly when his head was turned to the left.He had no dizziness or lightheadedness by time he we were finished with the Epley maneuver.    Assessment/Plan   Problem List Items Addressed This Visit             ICD-10-CM       Medium    Vertigo - Primary R42     Symptoms and exam are consistent with classic laryngitis.  We discussed avoiding the facts.  I told him that the Epley maneuver may be beneficial and if it is he will notice this within the next 3 days.  If no improvement over the next several weeks he is to let me know.  If symptoms worsen before then he is to let me know.

## 2024-02-07 ENCOUNTER — PHARMACY VISIT (OUTPATIENT)
Dept: PHARMACY | Facility: CLINIC | Age: 84
End: 2024-02-07
Payer: MEDICARE

## 2024-02-19 ENCOUNTER — APPOINTMENT (OUTPATIENT)
Dept: CARDIOLOGY | Facility: CLINIC | Age: 84
End: 2024-02-19
Payer: MEDICARE

## 2024-02-21 ENCOUNTER — CLINICAL SUPPORT (OUTPATIENT)
Dept: AUDIOLOGY | Facility: CLINIC | Age: 84
End: 2024-02-21

## 2024-02-21 DIAGNOSIS — H90.6 MIXED CONDUCTIVE AND SENSORINEURAL HEARING LOSS, BILATERAL: Primary | ICD-10-CM

## 2024-02-21 PROCEDURE — V5299 HEARING SERVICE: HCPCS | Performed by: AUDIOLOGIST

## 2024-02-21 ASSESSMENT — ENCOUNTER SYMPTOMS
OCCASIONAL FEELINGS OF UNSTEADINESS: 0
LOSS OF SENSATION IN FEET: 0
DEPRESSION: 0

## 2024-02-21 ASSESSMENT — PAIN - FUNCTIONAL ASSESSMENT: PAIN_FUNCTIONAL_ASSESSMENT: 0-10

## 2024-02-21 ASSESSMENT — PAIN SCALES - GENERAL: PAINLEVEL_OUTOF10: 0 - NO PAIN

## 2024-02-21 NOTE — PROGRESS NOTES
Herminio Claus, age 83 years, is here today for a hearing aid tubing change    Phonak Lety M30-SP BTEs fit binaurally (warranty expires 11-):  Right serial #: [id number]  Left serial #: [id number]    1) Change tubing bilaterally  2) Recommended annual hearing re-evaluation and consistent hearing aid use

## 2024-02-29 ENCOUNTER — SPECIALTY PHARMACY (OUTPATIENT)
Dept: PHARMACY | Facility: CLINIC | Age: 84
End: 2024-02-29

## 2024-03-05 ENCOUNTER — PHARMACY VISIT (OUTPATIENT)
Dept: PHARMACY | Facility: CLINIC | Age: 84
End: 2024-03-05
Payer: MEDICARE

## 2024-03-05 ENCOUNTER — SPECIALTY PHARMACY (OUTPATIENT)
Dept: PHARMACY | Facility: CLINIC | Age: 84
End: 2024-03-05

## 2024-03-05 PROCEDURE — RXMED WILLOW AMBULATORY MEDICATION CHARGE

## 2024-03-11 ENCOUNTER — OFFICE VISIT (OUTPATIENT)
Dept: CARDIOLOGY | Facility: CLINIC | Age: 84
End: 2024-03-11
Payer: MEDICARE

## 2024-03-11 ENCOUNTER — HOSPITAL ENCOUNTER (OUTPATIENT)
Dept: CARDIOLOGY | Facility: CLINIC | Age: 84
Discharge: HOME | End: 2024-03-11
Payer: MEDICARE

## 2024-03-11 VITALS
WEIGHT: 189.6 LBS | DIASTOLIC BLOOD PRESSURE: 80 MMHG | HEART RATE: 66 BPM | SYSTOLIC BLOOD PRESSURE: 142 MMHG | BODY MASS INDEX: 31.59 KG/M2 | OXYGEN SATURATION: 96 % | HEIGHT: 65 IN

## 2024-03-11 DIAGNOSIS — E78.00 PURE HYPERCHOLESTEROLEMIA, UNSPECIFIED: ICD-10-CM

## 2024-03-11 DIAGNOSIS — I35.0 AORTIC VALVE STENOSIS, ETIOLOGY OF CARDIAC VALVE DISEASE UNSPECIFIED: Primary | ICD-10-CM

## 2024-03-11 DIAGNOSIS — I10 ESSENTIAL (PRIMARY) HYPERTENSION: ICD-10-CM

## 2024-03-11 DIAGNOSIS — I35.0 AORTIC VALVE STENOSIS, ETIOLOGY OF CARDIAC VALVE DISEASE UNSPECIFIED: ICD-10-CM

## 2024-03-11 DIAGNOSIS — I25.10 CAD IN NATIVE ARTERY: ICD-10-CM

## 2024-03-11 LAB
AORTIC VALVE MEAN GRADIENT: 14.6 MMHG
AORTIC VALVE PEAK VELOCITY: 2.54 M/S
AV PEAK GRADIENT: 25.8 MMHG
AVA (PEAK VEL): 1.5 CM2
AVA (VTI): 1.48 CM2
EJECTION FRACTION APICAL 4 CHAMBER: 67.5
EJECTION FRACTION: 66 %
LEFT ATRIUM VOLUME AREA LENGTH INDEX BSA: 31.1 ML/M2
LEFT VENTRICLE INTERNAL DIMENSION DIASTOLE: 4.18 CM (ref 3.5–6)
LEFT VENTRICULAR OUTFLOW TRACT DIAMETER: 2.15 CM
MITRAL VALVE E/A RATIO: 0.61
MITRAL VALVE E/E' RATIO: 10.25
RIGHT VENTRICLE FREE WALL PEAK S': 6.57 CM/S
TRICUSPID ANNULAR PLANE SYSTOLIC EXCURSION: 1.4 CM

## 2024-03-11 PROCEDURE — 1159F MED LIST DOCD IN RCRD: CPT | Performed by: NURSE PRACTITIONER

## 2024-03-11 PROCEDURE — 99214 OFFICE O/P EST MOD 30 MIN: CPT | Performed by: NURSE PRACTITIONER

## 2024-03-11 PROCEDURE — 1036F TOBACCO NON-USER: CPT | Performed by: NURSE PRACTITIONER

## 2024-03-11 PROCEDURE — 3077F SYST BP >= 140 MM HG: CPT | Performed by: NURSE PRACTITIONER

## 2024-03-11 PROCEDURE — 93356 MYOCRD STRAIN IMG SPCKL TRCK: CPT

## 2024-03-11 PROCEDURE — 1126F AMNT PAIN NOTED NONE PRSNT: CPT | Performed by: NURSE PRACTITIONER

## 2024-03-11 PROCEDURE — 1160F RVW MEDS BY RX/DR IN RCRD: CPT | Performed by: NURSE PRACTITIONER

## 2024-03-11 PROCEDURE — 93306 TTE W/DOPPLER COMPLETE: CPT | Performed by: INTERNAL MEDICINE

## 2024-03-11 PROCEDURE — 93356 MYOCRD STRAIN IMG SPCKL TRCK: CPT | Performed by: INTERNAL MEDICINE

## 2024-03-11 PROCEDURE — 3078F DIAST BP <80 MM HG: CPT | Performed by: NURSE PRACTITIONER

## 2024-03-11 ASSESSMENT — PATIENT HEALTH QUESTIONNAIRE - PHQ9
SUM OF ALL RESPONSES TO PHQ9 QUESTIONS 1 AND 2: 0
2. FEELING DOWN, DEPRESSED OR HOPELESS: NOT AT ALL
1. LITTLE INTEREST OR PLEASURE IN DOING THINGS: NOT AT ALL

## 2024-03-11 ASSESSMENT — ENCOUNTER SYMPTOMS
DEPRESSION: 0
OCCASIONAL FEELINGS OF UNSTEADINESS: 1
LOSS OF SENSATION IN FEET: 0

## 2024-03-11 ASSESSMENT — PAIN SCALES - GENERAL: PAINLEVEL: 0-NO PAIN

## 2024-03-11 NOTE — PROGRESS NOTES
Subjective   Herminio Devlin is a 83 y.o. male.    Chief Complaint:  Valve Disorder, Coronary Artery Disease, Hypertension, and Hyperlipidemia    Mr. Devlin returns for a 6 month follow up. He is seen in collaboration with Dr. Yeboah. He had a repeat echocardiogram prior to our visit today. He has been feeling well from a cardiac standpoint. He has remained compliant with his medications, denying any intolerances. He remains fairly active, denying any exertional chest pain or shortness of breath. He denies any recent ER visits or hospitalizations. He offers no specific cardiovascular complaints or concerns today. He denies any complaints of chest pain, shortness of breath, lightheadedness, dizziness, palpitations, syncope, orthopnea, paroxysmal nocturnal dyspnea, lower extremity swelling or bleeding concerns.        Review of Systems   All other systems reviewed and are negative.      Objective   Physical Exam  Constitutional:       Appearance: Healthy appearance. In no distress  Pulmonary:      Effort: Pulmonary effort is normal.      Breath sounds: Normal breath sounds.   Cardiovascular:      Normal rate. Regular rhythm. Normal S1. Normal S2.       Murmurs: There is 2-3/6 systolic murmur.      Carotids: right carotid pulse +2, no bruit heard over the right carotid. left carotid pulse +2, no bruit heard over the left carotid.  Edema:     Peripheral edema absent.   Abdominal:      Palpations: Abdomen is soft.   Musculoskeletal:       Cervical back: Normal range of motion.   Skin:     General: Skin is warm and dry. Normal color and pigmentation   Neurological:      Mental Status: Alert and oriented to person, place and time.   Psychiatric:     Mood and Affect: appropriate mood and appropriate affect.       Lab Review:   Lab Results   Component Value Date     09/25/2023    K 4.6 09/25/2023     09/25/2023    CO2 26 09/25/2023    BUN 15 09/25/2023    CREATININE 1.1 09/25/2023    GLUCOSE 95 09/25/2023     CALCIUM 9.5 09/25/2023     Lab Results   Component Value Date    WBC 7.6 09/25/2023    HGB 12.5 (L) 09/25/2023    HCT 38.4 (L) 09/25/2023    .6 (H) 09/25/2023     09/25/2023     Lab Results   Component Value Date    CHOL 91 (L) 09/25/2023    TRIG 179 (H) 09/25/2023    HDL 51 09/25/2023       Assessment/Plan   Mr. Devlin is a pleasant 83 year old  male with a past medical history significant for carotid artery stenosis s/p right CEA, PAD s/p kissing stent technique in the iliacs and into the distal abdominal aorta, hyperlipidemia, hypertension and CAD s/p CABG x 4 with heart catheterization 3/10/2021 showing stable native vessel CAD with widely patent LIMA-LAD and SVG-RCA and totally occluded SVG-OM1 and totally occluded SVG-OM2 with mild aortic stenosis and normal LV systolic function. Echocardiogram 3/2023 showed an EF of 60-65% with mild TR, mild MR and mild aortic stenosis. He presents today for routine follow up stable from a cardiac standpoint. His VS and EKG remain stable. He remains on appropriate antiplatelet and lipid lowering therapy with his LDL at goal. I will have him continue all medications unchanged. I will call him with the results of today's echocardiogram when available. He will follow up with us in clinic in 6 months. He knows to call for any concerns.

## 2024-03-29 ENCOUNTER — SPECIALTY PHARMACY (OUTPATIENT)
Dept: PHARMACY | Facility: CLINIC | Age: 84
End: 2024-03-29

## 2024-03-29 PROCEDURE — RXMED WILLOW AMBULATORY MEDICATION CHARGE

## 2024-04-02 ENCOUNTER — APPOINTMENT (OUTPATIENT)
Dept: PRIMARY CARE | Facility: CLINIC | Age: 84
End: 2024-04-02
Payer: MEDICARE

## 2024-04-03 ENCOUNTER — PHARMACY VISIT (OUTPATIENT)
Dept: PHARMACY | Facility: CLINIC | Age: 84
End: 2024-04-03
Payer: MEDICARE

## 2024-04-03 ENCOUNTER — SPECIALTY PHARMACY (OUTPATIENT)
Dept: PHARMACY | Facility: CLINIC | Age: 84
End: 2024-04-03

## 2024-04-03 ENCOUNTER — APPOINTMENT (OUTPATIENT)
Dept: OTOLARYNGOLOGY | Facility: CLINIC | Age: 84
End: 2024-04-03
Payer: MEDICARE

## 2024-04-16 DIAGNOSIS — E78.00 PURE HYPERCHOLESTEROLEMIA, UNSPECIFIED: ICD-10-CM

## 2024-04-16 RX ORDER — EVOLOCUMAB 140 MG/ML
INJECTION, SOLUTION SUBCUTANEOUS
Qty: 2 ML | Refills: 3 | Status: SHIPPED | OUTPATIENT
Start: 2024-04-16 | End: 2025-04-16

## 2024-04-25 ENCOUNTER — APPOINTMENT (OUTPATIENT)
Dept: PRIMARY CARE | Facility: CLINIC | Age: 84
End: 2024-04-25
Payer: MEDICARE

## 2024-04-26 ENCOUNTER — TELEPHONE (OUTPATIENT)
Dept: PRIMARY CARE | Facility: CLINIC | Age: 84
End: 2024-04-26
Payer: MEDICARE

## 2024-04-26 DIAGNOSIS — G62.89 OTHER POLYNEUROPATHY: ICD-10-CM

## 2024-04-26 RX ORDER — GABAPENTIN 600 MG/1
600 TABLET ORAL 3 TIMES DAILY
Qty: 270 TABLET | Refills: 0 | Status: SHIPPED | OUTPATIENT
Start: 2024-04-26

## 2024-04-26 NOTE — TELEPHONE ENCOUNTER
Patient called RX LINE:    Gabapentin (Neurontin) 600mg tablet    [facility] on [address] in [place].    Patients number: [phone]

## 2024-05-13 ENCOUNTER — LAB (OUTPATIENT)
Dept: LAB | Facility: LAB | Age: 84
End: 2024-05-13
Payer: MEDICARE

## 2024-05-13 DIAGNOSIS — E78.00 PURE HYPERCHOLESTEROLEMIA, UNSPECIFIED: ICD-10-CM

## 2024-05-13 DIAGNOSIS — E11.59 TYPE 2 DIABETES MELLITUS WITH OTHER CIRCULATORY COMPLICATION, WITHOUT LONG-TERM CURRENT USE OF INSULIN (MULTI): ICD-10-CM

## 2024-05-13 LAB
ANION GAP SERPL CALC-SCNC: 15 MMOL/L
BUN SERPL-MCNC: 17 MG/DL (ref 8–25)
CALCIUM SERPL-MCNC: 9.4 MG/DL (ref 8.5–10.4)
CHLORIDE SERPL-SCNC: 101 MMOL/L (ref 97–107)
CHOLEST SERPL-MCNC: 105 MG/DL (ref 133–200)
CHOLEST/HDLC SERPL: 2.3 {RATIO}
CO2 SERPL-SCNC: 25 MMOL/L (ref 24–31)
CREAT SERPL-MCNC: 1.2 MG/DL (ref 0.4–1.6)
EGFRCR SERPLBLD CKD-EPI 2021: 60 ML/MIN/1.73M*2
EST. AVERAGE GLUCOSE BLD GHB EST-MCNC: 117 MG/DL
GLUCOSE SERPL-MCNC: 89 MG/DL (ref 65–99)
HBA1C MFR BLD: 5.7 %
HDLC SERPL-MCNC: 46 MG/DL
LDLC SERPL CALC-MCNC: 18 MG/DL (ref 65–130)
POTASSIUM SERPL-SCNC: 4.8 MMOL/L (ref 3.4–5.1)
SODIUM SERPL-SCNC: 141 MMOL/L (ref 133–145)
TRIGL SERPL-MCNC: 206 MG/DL (ref 40–150)

## 2024-05-13 PROCEDURE — 83036 HEMOGLOBIN GLYCOSYLATED A1C: CPT

## 2024-05-13 PROCEDURE — 80048 BASIC METABOLIC PNL TOTAL CA: CPT

## 2024-05-13 PROCEDURE — 36415 COLL VENOUS BLD VENIPUNCTURE: CPT

## 2024-05-13 PROCEDURE — 80061 LIPID PANEL: CPT

## 2024-05-14 ENCOUNTER — OFFICE VISIT (OUTPATIENT)
Dept: PRIMARY CARE | Facility: CLINIC | Age: 84
End: 2024-05-14
Payer: MEDICARE

## 2024-05-14 ENCOUNTER — TELEPHONE (OUTPATIENT)
Dept: PRIMARY CARE | Facility: CLINIC | Age: 84
End: 2024-05-14

## 2024-05-14 VITALS
RESPIRATION RATE: 16 BRPM | SYSTOLIC BLOOD PRESSURE: 130 MMHG | BODY MASS INDEX: 31.12 KG/M2 | OXYGEN SATURATION: 97 % | DIASTOLIC BLOOD PRESSURE: 70 MMHG | WEIGHT: 187 LBS | HEART RATE: 70 BPM

## 2024-05-14 DIAGNOSIS — M1A.9XX0 CHRONIC GOUT WITHOUT TOPHUS, UNSPECIFIED CAUSE, UNSPECIFIED SITE: ICD-10-CM

## 2024-05-14 DIAGNOSIS — E11.59 TYPE 2 DIABETES MELLITUS WITH OTHER CIRCULATORY COMPLICATION, WITHOUT LONG-TERM CURRENT USE OF INSULIN (MULTI): Primary | ICD-10-CM

## 2024-05-14 DIAGNOSIS — E78.00 PURE HYPERCHOLESTEROLEMIA, UNSPECIFIED: ICD-10-CM

## 2024-05-14 DIAGNOSIS — E11.9 TYPE 2 DIABETES MELLITUS WITHOUT COMPLICATION, WITHOUT LONG-TERM CURRENT USE OF INSULIN (MULTI): ICD-10-CM

## 2024-05-14 DIAGNOSIS — I10 ESSENTIAL (PRIMARY) HYPERTENSION: ICD-10-CM

## 2024-05-14 DIAGNOSIS — Z79.899 MEDICATION MANAGEMENT: ICD-10-CM

## 2024-05-14 PROCEDURE — 1126F AMNT PAIN NOTED NONE PRSNT: CPT | Performed by: FAMILY MEDICINE

## 2024-05-14 PROCEDURE — 1160F RVW MEDS BY RX/DR IN RCRD: CPT | Performed by: FAMILY MEDICINE

## 2024-05-14 PROCEDURE — 3075F SYST BP GE 130 - 139MM HG: CPT | Performed by: FAMILY MEDICINE

## 2024-05-14 PROCEDURE — 1036F TOBACCO NON-USER: CPT | Performed by: FAMILY MEDICINE

## 2024-05-14 PROCEDURE — 3078F DIAST BP <80 MM HG: CPT | Performed by: FAMILY MEDICINE

## 2024-05-14 PROCEDURE — 1159F MED LIST DOCD IN RCRD: CPT | Performed by: FAMILY MEDICINE

## 2024-05-14 PROCEDURE — 99214 OFFICE O/P EST MOD 30 MIN: CPT | Performed by: FAMILY MEDICINE

## 2024-05-14 ASSESSMENT — ENCOUNTER SYMPTOMS
LOSS OF SENSATION IN FEET: 0
OCCASIONAL FEELINGS OF UNSTEADINESS: 0
DEPRESSION: 0

## 2024-05-14 ASSESSMENT — PAIN SCALES - GENERAL: PAINLEVEL: 0-NO PAIN

## 2024-05-14 ASSESSMENT — PATIENT HEALTH QUESTIONNAIRE - PHQ9
2. FEELING DOWN, DEPRESSED OR HOPELESS: NOT AT ALL
1. LITTLE INTEREST OR PLEASURE IN DOING THINGS: NOT AT ALL
SUM OF ALL RESPONSES TO PHQ9 QUESTIONS 1 AND 2: 0

## 2024-05-14 NOTE — ASSESSMENT & PLAN NOTE
Continue metoprolol lisinopril.  Continue following with cardiologist.  Recheck in 6 months at CPE.

## 2024-06-15 ENCOUNTER — APPOINTMENT (OUTPATIENT)
Dept: CARDIOLOGY | Facility: HOSPITAL | Age: 84
DRG: 811 | End: 2024-06-15
Payer: MEDICARE

## 2024-06-15 ENCOUNTER — APPOINTMENT (OUTPATIENT)
Dept: RADIOLOGY | Facility: HOSPITAL | Age: 84
DRG: 811 | End: 2024-06-15
Payer: MEDICARE

## 2024-06-15 ENCOUNTER — HOSPITAL ENCOUNTER (INPATIENT)
Facility: HOSPITAL | Age: 84
LOS: 3 days | Discharge: HOME | DRG: 811 | End: 2024-06-18
Attending: INTERNAL MEDICINE | Admitting: INTERNAL MEDICINE
Payer: MEDICARE

## 2024-06-15 ENCOUNTER — HOSPITAL ENCOUNTER (INPATIENT)
Facility: HOSPITAL | Age: 84
LOS: 1 days | Discharge: STILL ADMITTED OR ADMINISTRATIVE | DRG: 811 | End: 2024-06-15
Attending: STUDENT IN AN ORGANIZED HEALTH CARE EDUCATION/TRAINING PROGRAM | Admitting: HOSPITALIST
Payer: MEDICARE

## 2024-06-15 ENCOUNTER — HOSPITAL ENCOUNTER (EMERGENCY)
Facility: HOSPITAL | Age: 84
Discharge: HOME | DRG: 811 | End: 2024-06-15
Attending: STUDENT IN AN ORGANIZED HEALTH CARE EDUCATION/TRAINING PROGRAM
Payer: MEDICARE

## 2024-06-15 VITALS
BODY MASS INDEX: 29.73 KG/M2 | HEART RATE: 84 BPM | SYSTOLIC BLOOD PRESSURE: 125 MMHG | WEIGHT: 185 LBS | OXYGEN SATURATION: 98 % | TEMPERATURE: 97.7 F | HEIGHT: 66 IN | DIASTOLIC BLOOD PRESSURE: 74 MMHG | RESPIRATION RATE: 15 BRPM

## 2024-06-15 VITALS
HEIGHT: 65 IN | BODY MASS INDEX: 30.45 KG/M2 | SYSTOLIC BLOOD PRESSURE: 117 MMHG | DIASTOLIC BLOOD PRESSURE: 63 MMHG | HEART RATE: 91 BPM | OXYGEN SATURATION: 100 % | WEIGHT: 182.76 LBS | RESPIRATION RATE: 18 BRPM | TEMPERATURE: 97.9 F

## 2024-06-15 DIAGNOSIS — K92.2 GI BLEED DUE TO NSAIDS: ICD-10-CM

## 2024-06-15 DIAGNOSIS — I21.A1 MYOCARDIAL INFARCTION TYPE 2 (MULTI): ICD-10-CM

## 2024-06-15 DIAGNOSIS — I73.9 PERIPHERAL ARTERIAL DISEASE (CMS-HCC): ICD-10-CM

## 2024-06-15 DIAGNOSIS — D64.9 SYMPTOMATIC ANEMIA: Primary | ICD-10-CM

## 2024-06-15 DIAGNOSIS — I21.4 NSTEMI (NON-ST ELEVATED MYOCARDIAL INFARCTION) (MULTI): Primary | ICD-10-CM

## 2024-06-15 DIAGNOSIS — K92.1 MELENA: ICD-10-CM

## 2024-06-15 DIAGNOSIS — D64.9 SYMPTOMATIC ANEMIA: ICD-10-CM

## 2024-06-15 DIAGNOSIS — M1A.9XX0 CHRONIC GOUT WITHOUT TOPHUS, UNSPECIFIED CAUSE, UNSPECIFIED SITE: ICD-10-CM

## 2024-06-15 DIAGNOSIS — T39.395A GI BLEED DUE TO NSAIDS: ICD-10-CM

## 2024-06-15 DIAGNOSIS — R11.2 NAUSEA AND VOMITING, UNSPECIFIED VOMITING TYPE: Primary | ICD-10-CM

## 2024-06-15 DIAGNOSIS — R06.02 SHORTNESS OF BREATH: ICD-10-CM

## 2024-06-15 DIAGNOSIS — I48.0 PAROXYSMAL ATRIAL FIBRILLATION (MULTI): ICD-10-CM

## 2024-06-15 DIAGNOSIS — G62.89 OTHER POLYNEUROPATHY: ICD-10-CM

## 2024-06-15 LAB
ABO GROUP (TYPE) IN BLOOD: NORMAL
ABO GROUP (TYPE) IN BLOOD: NORMAL
ALBUMIN SERPL-MCNC: 3.9 G/DL (ref 3.5–5)
ALP BLD-CCNC: 58 U/L (ref 35–125)
ALT SERPL-CCNC: 11 U/L (ref 5–40)
ANION GAP SERPL CALC-SCNC: 11 MMOL/L
ANION GAP SERPL CALC-SCNC: 14 MMOL/L
ANTIBODY SCREEN: NORMAL
APTT PPP: 64.2 SECONDS (ref 22–32.5)
APTT PPP: >139 SECONDS (ref 22–32.5)
AST SERPL-CCNC: 20 U/L (ref 5–40)
BASOPHILS # BLD AUTO: 0.03 X10*3/UL (ref 0–0.1)
BASOPHILS # BLD AUTO: 0.03 X10*3/UL (ref 0–0.1)
BASOPHILS NFR BLD AUTO: 0.2 %
BASOPHILS NFR BLD AUTO: 0.3 %
BILIRUB SERPL-MCNC: 0.3 MG/DL (ref 0.1–1.2)
BUN SERPL-MCNC: 55 MG/DL (ref 8–25)
BUN SERPL-MCNC: 70 MG/DL (ref 8–25)
CALCIUM SERPL-MCNC: 9.1 MG/DL (ref 8.5–10.4)
CALCIUM SERPL-MCNC: 9.1 MG/DL (ref 8.5–10.4)
CHLORIDE SERPL-SCNC: 103 MMOL/L (ref 97–107)
CHLORIDE SERPL-SCNC: 107 MMOL/L (ref 97–107)
CO2 SERPL-SCNC: 23 MMOL/L (ref 24–31)
CO2 SERPL-SCNC: 25 MMOL/L (ref 24–31)
CREAT SERPL-MCNC: 1 MG/DL (ref 0.4–1.6)
CREAT SERPL-MCNC: 1.1 MG/DL (ref 0.4–1.6)
D DIMER PPP FEU-MCNC: 0.84 MG/L FEU (ref 0.19–0.5)
EGFRCR SERPLBLD CKD-EPI 2021: 67 ML/MIN/1.73M*2
EGFRCR SERPLBLD CKD-EPI 2021: 75 ML/MIN/1.73M*2
EOSINOPHIL # BLD AUTO: 0.01 X10*3/UL (ref 0–0.4)
EOSINOPHIL # BLD AUTO: 0.02 X10*3/UL (ref 0–0.4)
EOSINOPHIL NFR BLD AUTO: 0.1 %
EOSINOPHIL NFR BLD AUTO: 0.2 %
ERYTHROCYTE [DISTWIDTH] IN BLOOD BY AUTOMATED COUNT: 14.9 % (ref 11.5–14.5)
ERYTHROCYTE [DISTWIDTH] IN BLOOD BY AUTOMATED COUNT: 15.2 % (ref 11.5–14.5)
ERYTHROCYTE [DISTWIDTH] IN BLOOD BY AUTOMATED COUNT: 15.4 % (ref 11.5–14.5)
GLUCOSE SERPL-MCNC: 158 MG/DL (ref 65–99)
GLUCOSE SERPL-MCNC: 170 MG/DL (ref 65–99)
HCT VFR BLD AUTO: 23.3 % (ref 41–52)
HCT VFR BLD AUTO: 26.9 % (ref 41–52)
HCT VFR BLD AUTO: 31 % (ref 41–52)
HEMOCCULT SP1 STL QL: POSITIVE
HGB BLD-MCNC: 10.1 G/DL (ref 13.5–17.5)
HGB BLD-MCNC: 7.4 G/DL (ref 13.5–17.5)
HGB BLD-MCNC: 8.8 G/DL (ref 13.5–17.5)
IMM GRANULOCYTES # BLD AUTO: 0.05 X10*3/UL (ref 0–0.5)
IMM GRANULOCYTES # BLD AUTO: 0.08 X10*3/UL (ref 0–0.5)
IMM GRANULOCYTES NFR BLD AUTO: 0.4 % (ref 0–0.9)
IMM GRANULOCYTES NFR BLD AUTO: 0.7 % (ref 0–0.9)
INR PPP: 1.1 (ref 0.9–1.2)
LIPASE SERPL-CCNC: 25 U/L (ref 16–63)
LYMPHOCYTES # BLD AUTO: 1.22 X10*3/UL (ref 0.8–3)
LYMPHOCYTES # BLD AUTO: 1.45 X10*3/UL (ref 0.8–3)
LYMPHOCYTES NFR BLD AUTO: 10.1 %
LYMPHOCYTES NFR BLD AUTO: 12.9 %
MCH RBC QN AUTO: 31.9 PG (ref 26–34)
MCH RBC QN AUTO: 32.5 PG (ref 26–34)
MCH RBC QN AUTO: 32.6 PG (ref 26–34)
MCHC RBC AUTO-ENTMCNC: 31.8 G/DL (ref 32–36)
MCHC RBC AUTO-ENTMCNC: 32.6 G/DL (ref 32–36)
MCHC RBC AUTO-ENTMCNC: 32.7 G/DL (ref 32–36)
MCV RBC AUTO: 100 FL (ref 80–100)
MONOCYTES # BLD AUTO: 0.75 X10*3/UL (ref 0.05–0.8)
MONOCYTES # BLD AUTO: 0.76 X10*3/UL (ref 0.05–0.8)
MONOCYTES NFR BLD AUTO: 6.3 %
MONOCYTES NFR BLD AUTO: 6.7 %
NEUTROPHILS # BLD AUTO: 8.89 X10*3/UL (ref 1.6–5.5)
NEUTROPHILS # BLD AUTO: 9.95 X10*3/UL (ref 1.6–5.5)
NEUTROPHILS NFR BLD AUTO: 79.2 %
NEUTROPHILS NFR BLD AUTO: 82.9 %
NRBC BLD-RTO: 0 /100 WBCS (ref 0–0)
PLATELET # BLD AUTO: 236 X10*3/UL (ref 150–450)
PLATELET # BLD AUTO: 244 X10*3/UL (ref 150–450)
PLATELET # BLD AUTO: 253 X10*3/UL (ref 150–450)
PLATELET # BLD AUTO: 267 X10*3/UL (ref 150–450)
POTASSIUM SERPL-SCNC: 4.5 MMOL/L (ref 3.4–5.1)
POTASSIUM SERPL-SCNC: 4.7 MMOL/L (ref 3.4–5.1)
PROT SERPL-MCNC: 6.5 G/DL (ref 5.9–7.9)
PROTHROMBIN TIME: 11.2 SECONDS (ref 9.3–12.7)
RBC # BLD AUTO: 2.32 X10*6/UL (ref 4.5–5.9)
RBC # BLD AUTO: 2.7 X10*6/UL (ref 4.5–5.9)
RBC # BLD AUTO: 3.11 X10*6/UL (ref 4.5–5.9)
RH FACTOR (ANTIGEN D): NORMAL
RH FACTOR (ANTIGEN D): NORMAL
SODIUM SERPL-SCNC: 141 MMOL/L (ref 133–145)
SODIUM SERPL-SCNC: 142 MMOL/L (ref 133–145)
TROPONIN T SERPL-MCNC: 70 NG/L
TROPONIN T SERPL-MCNC: 81 NG/L
TROPONIN T SERPL-MCNC: 82 NG/L
TROPONIN T SERPL-MCNC: 94 NG/L
WBC # BLD AUTO: 11.2 X10*3/UL (ref 4.4–11.3)
WBC # BLD AUTO: 11.3 X10*3/UL (ref 4.4–11.3)
WBC # BLD AUTO: 12 X10*3/UL (ref 4.4–11.3)

## 2024-06-15 PROCEDURE — 36415 COLL VENOUS BLD VENIPUNCTURE: CPT | Performed by: STUDENT IN AN ORGANIZED HEALTH CARE EDUCATION/TRAINING PROGRAM

## 2024-06-15 PROCEDURE — 85027 COMPLETE CBC AUTOMATED: CPT | Performed by: INTERNAL MEDICINE

## 2024-06-15 PROCEDURE — 85027 COMPLETE CBC AUTOMATED: CPT | Performed by: HOSPITALIST

## 2024-06-15 PROCEDURE — 84132 ASSAY OF SERUM POTASSIUM: CPT | Performed by: STUDENT IN AN ORGANIZED HEALTH CARE EDUCATION/TRAINING PROGRAM

## 2024-06-15 PROCEDURE — 2500000004 HC RX 250 GENERAL PHARMACY W/ HCPCS (ALT 636 FOR OP/ED): Performed by: HOSPITALIST

## 2024-06-15 PROCEDURE — 71045 X-RAY EXAM CHEST 1 VIEW: CPT

## 2024-06-15 PROCEDURE — 82270 OCCULT BLOOD FECES: CPT | Performed by: STUDENT IN AN ORGANIZED HEALTH CARE EDUCATION/TRAINING PROGRAM

## 2024-06-15 PROCEDURE — 86920 COMPATIBILITY TEST SPIN: CPT

## 2024-06-15 PROCEDURE — 2500000004 HC RX 250 GENERAL PHARMACY W/ HCPCS (ALT 636 FOR OP/ED): Performed by: STUDENT IN AN ORGANIZED HEALTH CARE EDUCATION/TRAINING PROGRAM

## 2024-06-15 PROCEDURE — 85049 AUTOMATED PLATELET COUNT: CPT | Performed by: STUDENT IN AN ORGANIZED HEALTH CARE EDUCATION/TRAINING PROGRAM

## 2024-06-15 PROCEDURE — 84484 ASSAY OF TROPONIN QUANT: CPT | Mod: 91 | Performed by: STUDENT IN AN ORGANIZED HEALTH CARE EDUCATION/TRAINING PROGRAM

## 2024-06-15 PROCEDURE — 36430 TRANSFUSION BLD/BLD COMPNT: CPT

## 2024-06-15 PROCEDURE — 85730 THROMBOPLASTIN TIME PARTIAL: CPT | Performed by: HOSPITALIST

## 2024-06-15 PROCEDURE — C9113 INJ PANTOPRAZOLE SODIUM, VIA: HCPCS | Performed by: HOSPITALIST

## 2024-06-15 PROCEDURE — 96374 THER/PROPH/DIAG INJ IV PUSH: CPT

## 2024-06-15 PROCEDURE — 2500000001 HC RX 250 WO HCPCS SELF ADMINISTERED DRUGS (ALT 637 FOR MEDICARE OP): Performed by: STUDENT IN AN ORGANIZED HEALTH CARE EDUCATION/TRAINING PROGRAM

## 2024-06-15 PROCEDURE — 86901 BLOOD TYPING SEROLOGIC RH(D): CPT | Performed by: HOSPITALIST

## 2024-06-15 PROCEDURE — 85300 ANTITHROMBIN III ACTIVITY: CPT | Performed by: STUDENT IN AN ORGANIZED HEALTH CARE EDUCATION/TRAINING PROGRAM

## 2024-06-15 PROCEDURE — 80048 BASIC METABOLIC PNL TOTAL CA: CPT | Mod: CCI | Performed by: STUDENT IN AN ORGANIZED HEALTH CARE EDUCATION/TRAINING PROGRAM

## 2024-06-15 PROCEDURE — 85610 PROTHROMBIN TIME: CPT | Performed by: HOSPITALIST

## 2024-06-15 PROCEDURE — 2500000001 HC RX 250 WO HCPCS SELF ADMINISTERED DRUGS (ALT 637 FOR MEDICARE OP): Performed by: HOSPITALIST

## 2024-06-15 PROCEDURE — 83690 ASSAY OF LIPASE: CPT | Performed by: STUDENT IN AN ORGANIZED HEALTH CARE EDUCATION/TRAINING PROGRAM

## 2024-06-15 PROCEDURE — 85025 COMPLETE CBC W/AUTO DIFF WBC: CPT | Mod: 91 | Performed by: STUDENT IN AN ORGANIZED HEALTH CARE EDUCATION/TRAINING PROGRAM

## 2024-06-15 PROCEDURE — 99285 EMERGENCY DEPT VISIT HI MDM: CPT | Mod: 25

## 2024-06-15 PROCEDURE — 2060000001 HC INTERMEDIATE ICU ROOM DAILY

## 2024-06-15 PROCEDURE — 85730 THROMBOPLASTIN TIME PARTIAL: CPT | Performed by: STUDENT IN AN ORGANIZED HEALTH CARE EDUCATION/TRAINING PROGRAM

## 2024-06-15 PROCEDURE — 93005 ELECTROCARDIOGRAM TRACING: CPT

## 2024-06-15 PROCEDURE — 71045 X-RAY EXAM CHEST 1 VIEW: CPT | Performed by: RADIOLOGY

## 2024-06-15 PROCEDURE — P9040 RBC LEUKOREDUCED IRRADIATED: HCPCS

## 2024-06-15 RX ORDER — ALLOPURINOL 300 MG/1
300 TABLET ORAL DAILY
Status: DISCONTINUED | OUTPATIENT
Start: 2024-06-16 | End: 2024-06-15 | Stop reason: HOSPADM

## 2024-06-15 RX ORDER — ONDANSETRON 4 MG/1
4 TABLET, ORALLY DISINTEGRATING ORAL EVERY 8 HOURS PRN
Qty: 20 TABLET | Refills: 0 | Status: SHIPPED | OUTPATIENT
Start: 2024-06-15 | End: 2024-06-15 | Stop reason: HOSPADM

## 2024-06-15 RX ORDER — PANTOPRAZOLE SODIUM 40 MG/10ML
40 INJECTION, POWDER, LYOPHILIZED, FOR SOLUTION INTRAVENOUS 2 TIMES DAILY
Status: DISCONTINUED | OUTPATIENT
Start: 2024-06-15 | End: 2024-06-15

## 2024-06-15 RX ORDER — HEPARIN SODIUM 10000 [USP'U]/100ML
0-4000 INJECTION, SOLUTION INTRAVENOUS CONTINUOUS
Status: DISCONTINUED | OUTPATIENT
Start: 2024-06-15 | End: 2024-06-15

## 2024-06-15 RX ORDER — GABAPENTIN 600 MG/1
600 TABLET ORAL 3 TIMES DAILY
Status: DISCONTINUED | OUTPATIENT
Start: 2024-06-15 | End: 2024-06-15 | Stop reason: HOSPADM

## 2024-06-15 RX ORDER — PANTOPRAZOLE SODIUM 40 MG/10ML
40 INJECTION, POWDER, LYOPHILIZED, FOR SOLUTION INTRAVENOUS 2 TIMES DAILY
Status: DISCONTINUED | OUTPATIENT
Start: 2024-06-16 | End: 2024-06-15 | Stop reason: HOSPADM

## 2024-06-15 RX ORDER — ONDANSETRON HYDROCHLORIDE 2 MG/ML
4 INJECTION, SOLUTION INTRAVENOUS ONCE
Status: COMPLETED | OUTPATIENT
Start: 2024-06-15 | End: 2024-06-15

## 2024-06-15 RX ORDER — ALLOPURINOL 300 MG/1
300 TABLET ORAL DAILY
Status: ON HOLD
Start: 2024-06-15

## 2024-06-15 RX ORDER — ACETAMINOPHEN 325 MG/1
650 TABLET ORAL EVERY 6 HOURS PRN
Status: DISCONTINUED | OUTPATIENT
Start: 2024-06-15 | End: 2024-06-18 | Stop reason: HOSPADM

## 2024-06-15 RX ORDER — PROCHLORPERAZINE EDISYLATE 5 MG/ML
5 INJECTION INTRAMUSCULAR; INTRAVENOUS EVERY 6 HOURS PRN
Status: DISCONTINUED | OUTPATIENT
Start: 2024-06-15 | End: 2024-06-15 | Stop reason: HOSPADM

## 2024-06-15 RX ORDER — GABAPENTIN 600 MG/1
600 TABLET ORAL 3 TIMES DAILY
Status: ON HOLD
Start: 2024-06-15

## 2024-06-15 RX ORDER — PANTOPRAZOLE SODIUM 40 MG/10ML
40 INJECTION, POWDER, LYOPHILIZED, FOR SOLUTION INTRAVENOUS
Status: DISCONTINUED | OUTPATIENT
Start: 2024-06-16 | End: 2024-06-17

## 2024-06-15 RX ORDER — ACETAMINOPHEN 325 MG/1
325-650 TABLET ORAL EVERY 8 HOURS PRN
Status: ON HOLD
Start: 2024-06-15

## 2024-06-15 RX ORDER — PANTOPRAZOLE SODIUM 40 MG/10ML
80 INJECTION, POWDER, LYOPHILIZED, FOR SOLUTION INTRAVENOUS ONCE
Status: COMPLETED | OUTPATIENT
Start: 2024-06-15 | End: 2024-06-15

## 2024-06-15 RX ORDER — PROCHLORPERAZINE EDISYLATE 5 MG/ML
5 INJECTION INTRAMUSCULAR; INTRAVENOUS EVERY 6 HOURS PRN
Start: 2024-06-15 | End: 2024-06-18 | Stop reason: HOSPADM

## 2024-06-15 RX ORDER — PROCHLORPERAZINE EDISYLATE 5 MG/ML
5 INJECTION INTRAMUSCULAR; INTRAVENOUS EVERY 6 HOURS PRN
Status: DISCONTINUED | OUTPATIENT
Start: 2024-06-15 | End: 2024-06-18 | Stop reason: HOSPADM

## 2024-06-15 RX ORDER — HEPARIN SODIUM 5000 [USP'U]/ML
2000-4000 INJECTION, SOLUTION INTRAVENOUS; SUBCUTANEOUS AS NEEDED
Status: DISCONTINUED | OUTPATIENT
Start: 2024-06-15 | End: 2024-06-15

## 2024-06-15 RX ORDER — ALLOPURINOL 300 MG/1
300 TABLET ORAL DAILY
Status: DISCONTINUED | OUTPATIENT
Start: 2024-06-16 | End: 2024-06-17

## 2024-06-15 RX ORDER — PANTOPRAZOLE SODIUM 40 MG/10ML
40 INJECTION, POWDER, LYOPHILIZED, FOR SOLUTION INTRAVENOUS 2 TIMES DAILY
Start: 2024-06-16 | End: 2024-06-18 | Stop reason: HOSPADM

## 2024-06-15 RX ORDER — GABAPENTIN 600 MG/1
600 TABLET ORAL 3 TIMES DAILY
Status: DISCONTINUED | OUTPATIENT
Start: 2024-06-16 | End: 2024-06-17

## 2024-06-15 RX ORDER — ACETAMINOPHEN 325 MG/1
650 TABLET ORAL EVERY 6 HOURS PRN
Status: DISCONTINUED | OUTPATIENT
Start: 2024-06-15 | End: 2024-06-15 | Stop reason: HOSPADM

## 2024-06-15 RX ORDER — ASPIRIN 325 MG
325 TABLET ORAL ONCE
Status: COMPLETED | OUTPATIENT
Start: 2024-06-15 | End: 2024-06-15

## 2024-06-15 RX ORDER — HEPARIN SODIUM 5000 [USP'U]/ML
4000 INJECTION, SOLUTION INTRAVENOUS; SUBCUTANEOUS ONCE
Status: COMPLETED | OUTPATIENT
Start: 2024-06-15 | End: 2024-06-15

## 2024-06-15 RX ADMIN — PANTOPRAZOLE SODIUM 80 MG: 40 INJECTION, POWDER, FOR SOLUTION INTRAVENOUS at 17:24

## 2024-06-15 RX ADMIN — ONDANSETRON HYDROCHLORIDE 4 MG: 2 INJECTION INTRAMUSCULAR; INTRAVENOUS at 08:25

## 2024-06-15 RX ADMIN — ASPIRIN 325 MG: 325 TABLET ORAL at 15:19

## 2024-06-15 RX ADMIN — NITROGLYCERIN 0.5 INCH: 20 OINTMENT TOPICAL at 16:04

## 2024-06-15 RX ADMIN — GABAPENTIN 600 MG: 600 TABLET, FILM COATED ORAL at 21:17

## 2024-06-15 RX ADMIN — PROTAMINE SULFATE 20 MG: 10 INJECTION, SOLUTION INTRAVENOUS at 18:41

## 2024-06-15 RX ADMIN — HEPARIN SODIUM 4000 UNITS: 5000 INJECTION, SOLUTION INTRAVENOUS; SUBCUTANEOUS at 15:38

## 2024-06-15 RX ADMIN — HEPARIN SODIUM 1000 UNITS/HR: 10000 INJECTION, SOLUTION INTRAVENOUS at 15:40

## 2024-06-15 RX ADMIN — SODIUM CHLORIDE 500 ML: 9 INJECTION, SOLUTION INTRAVENOUS at 08:25

## 2024-06-15 SDOH — SOCIAL STABILITY: SOCIAL NETWORK: HOW OFTEN DO YOU GET TOGETHER WITH FRIENDS OR RELATIVES?: MORE THAN THREE TIMES A WEEK

## 2024-06-15 SDOH — SOCIAL STABILITY: SOCIAL INSECURITY: WITHIN THE LAST YEAR, HAVE YOU BEEN AFRAID OF YOUR PARTNER OR EX-PARTNER?: NO

## 2024-06-15 SDOH — SOCIAL STABILITY: SOCIAL INSECURITY: DO YOU FEEL ANYONE HAS EXPLOITED OR TAKEN ADVANTAGE OF YOU FINANCIALLY OR OF YOUR PERSONAL PROPERTY?: NO

## 2024-06-15 SDOH — SOCIAL STABILITY: SOCIAL INSECURITY: WITHIN THE LAST YEAR, HAVE YOU BEEN HUMILIATED OR EMOTIONALLY ABUSED IN OTHER WAYS BY YOUR PARTNER OR EX-PARTNER?: NO

## 2024-06-15 SDOH — SOCIAL STABILITY: SOCIAL INSECURITY: ABUSE: ADULT

## 2024-06-15 SDOH — SOCIAL STABILITY: SOCIAL NETWORK: HOW OFTEN DO YOU ATTENT MEETINGS OF THE CLUB OR ORGANIZATION YOU BELONG TO?: NEVER

## 2024-06-15 SDOH — SOCIAL STABILITY: SOCIAL INSECURITY: ARE THERE ANY APPARENT SIGNS OF INJURIES/BEHAVIORS THAT COULD BE RELATED TO ABUSE/NEGLECT?: NO

## 2024-06-15 SDOH — ECONOMIC STABILITY: FOOD INSECURITY: WITHIN THE PAST 12 MONTHS, YOU WORRIED THAT YOUR FOOD WOULD RUN OUT BEFORE YOU GOT MONEY TO BUY MORE.: NEVER TRUE

## 2024-06-15 SDOH — SOCIAL STABILITY: SOCIAL INSECURITY
WITHIN THE LAST YEAR, HAVE TO BEEN RAPED OR FORCED TO HAVE ANY KIND OF SEXUAL ACTIVITY BY YOUR PARTNER OR EX-PARTNER?: NO

## 2024-06-15 SDOH — SOCIAL STABILITY: SOCIAL INSECURITY: HAVE YOU HAD THOUGHTS OF HARMING ANYONE ELSE?: NO

## 2024-06-15 SDOH — ECONOMIC STABILITY: FOOD INSECURITY: WITHIN THE PAST 12 MONTHS, THE FOOD YOU BOUGHT JUST DIDN'T LAST AND YOU DIDN'T HAVE MONEY TO GET MORE.: NEVER TRUE

## 2024-06-15 SDOH — SOCIAL STABILITY: SOCIAL INSECURITY: DOES ANYONE TRY TO KEEP YOU FROM HAVING/CONTACTING OTHER FRIENDS OR DOING THINGS OUTSIDE YOUR HOME?: NO

## 2024-06-15 SDOH — SOCIAL STABILITY: SOCIAL INSECURITY: HAVE YOU HAD ANY THOUGHTS OF HARMING ANYONE ELSE?: NO

## 2024-06-15 SDOH — SOCIAL STABILITY: SOCIAL INSECURITY: DO YOU FEEL UNSAFE GOING BACK TO THE PLACE WHERE YOU ARE LIVING?: NO

## 2024-06-15 SDOH — SOCIAL STABILITY: SOCIAL INSECURITY: HAS ANYONE EVER THREATENED TO HURT YOUR FAMILY OR YOUR PETS?: NO

## 2024-06-15 SDOH — SOCIAL STABILITY: SOCIAL NETWORK: ARE YOU MARRIED, WIDOWED, DIVORCED, SEPARATED, NEVER MARRIED, OR LIVING WITH A PARTNER?: MARRIED

## 2024-06-15 SDOH — HEALTH STABILITY: MENTAL HEALTH
STRESS IS WHEN SOMEONE FEELS TENSE, NERVOUS, ANXIOUS, OR CAN'T SLEEP AT NIGHT BECAUSE THEIR MIND IS TROUBLED. HOW STRESSED ARE YOU?: NOT AT ALL

## 2024-06-15 SDOH — ECONOMIC STABILITY: INCOME INSECURITY: IN THE PAST 12 MONTHS, HAS THE ELECTRIC, GAS, OIL, OR WATER COMPANY THREATENED TO SHUT OFF SERVICE IN YOUR HOME?: NO

## 2024-06-15 SDOH — SOCIAL STABILITY: SOCIAL INSECURITY: WERE YOU ABLE TO COMPLETE ALL THE BEHAVIORAL HEALTH SCREENINGS?: YES

## 2024-06-15 SDOH — SOCIAL STABILITY: SOCIAL NETWORK
IN A TYPICAL WEEK, HOW MANY TIMES DO YOU TALK ON THE PHONE WITH FAMILY, FRIENDS, OR NEIGHBORS?: MORE THAN THREE TIMES A WEEK

## 2024-06-15 SDOH — SOCIAL STABILITY: SOCIAL NETWORK
DO YOU BELONG TO ANY CLUBS OR ORGANIZATIONS SUCH AS CHURCH GROUPS UNIONS, FRATERNAL OR ATHLETIC GROUPS, OR SCHOOL GROUPS?: NO

## 2024-06-15 SDOH — SOCIAL STABILITY: SOCIAL INSECURITY: ARE YOU OR HAVE YOU BEEN THREATENED OR ABUSED PHYSICALLY, EMOTIONALLY, OR SEXUALLY BY ANYONE?: NO

## 2024-06-15 SDOH — HEALTH STABILITY: PHYSICAL HEALTH: ON AVERAGE, HOW MANY MINUTES DO YOU ENGAGE IN EXERCISE AT THIS LEVEL?: 60 MIN

## 2024-06-15 SDOH — HEALTH STABILITY: MENTAL HEALTH
HOW OFTEN DO YOU NEED TO HAVE SOMEONE HELP YOU WHEN YOU READ INSTRUCTIONS, PAMPHLETS, OR OTHER WRITTEN MATERIAL FROM YOUR DOCTOR OR PHARMACY?: NEVER

## 2024-06-15 SDOH — SOCIAL STABILITY: SOCIAL INSECURITY
WITHIN THE LAST YEAR, HAVE YOU BEEN KICKED, HIT, SLAPPED, OR OTHERWISE PHYSICALLY HURT BY YOUR PARTNER OR EX-PARTNER?: NO

## 2024-06-15 SDOH — SOCIAL STABILITY: SOCIAL NETWORK: HOW OFTEN DO YOU ATTEND CHURCH OR RELIGIOUS SERVICES?: 1 TO 4 TIMES PER YEAR

## 2024-06-15 SDOH — HEALTH STABILITY: PHYSICAL HEALTH: ON AVERAGE, HOW MANY DAYS PER WEEK DO YOU ENGAGE IN MODERATE TO STRENUOUS EXERCISE (LIKE A BRISK WALK)?: 7 DAYS

## 2024-06-15 ASSESSMENT — PATIENT HEALTH QUESTIONNAIRE - PHQ9
SUM OF ALL RESPONSES TO PHQ9 QUESTIONS 1 & 2: 0
2. FEELING DOWN, DEPRESSED OR HOPELESS: NOT AT ALL
1. LITTLE INTEREST OR PLEASURE IN DOING THINGS: NOT AT ALL
2. FEELING DOWN, DEPRESSED OR HOPELESS: NOT AT ALL
SUM OF ALL RESPONSES TO PHQ9 QUESTIONS 1 & 2: 0
1. LITTLE INTEREST OR PLEASURE IN DOING THINGS: NOT AT ALL

## 2024-06-15 ASSESSMENT — COGNITIVE AND FUNCTIONAL STATUS - GENERAL
CLIMB 3 TO 5 STEPS WITH RAILING: A LITTLE
EATING MEALS: A LITTLE
TOILETING: A LITTLE
DRESSING REGULAR UPPER BODY CLOTHING: A LITTLE
HELP NEEDED FOR BATHING: A LITTLE
DRESSING REGULAR LOWER BODY CLOTHING: A LITTLE
WALKING IN HOSPITAL ROOM: A LITTLE
MOVING FROM LYING ON BACK TO SITTING ON SIDE OF FLAT BED WITH BEDRAILS: A LITTLE
MOBILITY SCORE: 24
STANDING UP FROM CHAIR USING ARMS: A LITTLE
MOBILITY SCORE: 18
PATIENT BASELINE BEDBOUND: NO
MOVING TO AND FROM BED TO CHAIR: A LITTLE
TURNING FROM BACK TO SIDE WHILE IN FLAT BAD: A LITTLE
DAILY ACTIVITIY SCORE: 24
PERSONAL GROOMING: A LITTLE
PATIENT BASELINE BEDBOUND: NO
DAILY ACTIVITIY SCORE: 18

## 2024-06-15 ASSESSMENT — HEART SCORE
ECG: SIGNIFICANT ST-DEPRESSION
AGE: 65+
RISK FACTORS: >2 RISK FACTORS OR HX OF ATHEROSCLEROTIC DISEASE
HISTORY: MODERATELY SUSPICIOUS
TROPONIN: 1-3 TIMES NORMAL LIMIT
HEART SCORE: 8

## 2024-06-15 ASSESSMENT — COLUMBIA-SUICIDE SEVERITY RATING SCALE - C-SSRS
2. HAVE YOU ACTUALLY HAD ANY THOUGHTS OF KILLING YOURSELF?: NO
6. HAVE YOU EVER DONE ANYTHING, STARTED TO DO ANYTHING, OR PREPARED TO DO ANYTHING TO END YOUR LIFE?: NO
2. HAVE YOU ACTUALLY HAD ANY THOUGHTS OF KILLING YOURSELF?: NO
6. HAVE YOU EVER DONE ANYTHING, STARTED TO DO ANYTHING, OR PREPARED TO DO ANYTHING TO END YOUR LIFE?: NO
1. IN THE PAST MONTH, HAVE YOU WISHED YOU WERE DEAD OR WISHED YOU COULD GO TO SLEEP AND NOT WAKE UP?: NO
2. HAVE YOU ACTUALLY HAD ANY THOUGHTS OF KILLING YOURSELF?: NO
1. IN THE PAST MONTH, HAVE YOU WISHED YOU WERE DEAD OR WISHED YOU COULD GO TO SLEEP AND NOT WAKE UP?: YES
1. IN THE PAST MONTH, HAVE YOU WISHED YOU WERE DEAD OR WISHED YOU COULD GO TO SLEEP AND NOT WAKE UP?: NO
5. HAVE YOU STARTED TO WORK OUT OR WORKED OUT THE DETAILS OF HOW TO KILL YOURSELF? DO YOU INTEND TO CARRY OUT THIS PLAN?: NO
6. HAVE YOU EVER DONE ANYTHING, STARTED TO DO ANYTHING, OR PREPARED TO DO ANYTHING TO END YOUR LIFE?: NO
4. HAVE YOU HAD THESE THOUGHTS AND HAD SOME INTENTION OF ACTING ON THEM?: NO
1. IN THE PAST MONTH, HAVE YOU WISHED YOU WERE DEAD OR WISHED YOU COULD GO TO SLEEP AND NOT WAKE UP?: NO
6. HAVE YOU EVER DONE ANYTHING, STARTED TO DO ANYTHING, OR PREPARED TO DO ANYTHING TO END YOUR LIFE?: NO
2. HAVE YOU ACTUALLY HAD ANY THOUGHTS OF KILLING YOURSELF?: NO

## 2024-06-15 ASSESSMENT — ACTIVITIES OF DAILY LIVING (ADL)
FEEDING YOURSELF: INDEPENDENT
PATIENT'S MEMORY ADEQUATE TO SAFELY COMPLETE DAILY ACTIVITIES?: YES
BATHING: INDEPENDENT
HEARING - RIGHT EAR: DIFFICULTY WITH NOISE
LACK_OF_TRANSPORTATION: NO
DRESSING YOURSELF: INDEPENDENT
JUDGMENT_ADEQUATE_SAFELY_COMPLETE_DAILY_ACTIVITIES: YES
BATHING: INDEPENDENT
JUDGMENT_ADEQUATE_SAFELY_COMPLETE_DAILY_ACTIVITIES: YES
HEARING - LEFT EAR: DIFFICULTY WITH NOISE
GROOMING: INDEPENDENT
PATIENT'S MEMORY ADEQUATE TO SAFELY COMPLETE DAILY ACTIVITIES?: YES
LACK_OF_TRANSPORTATION: PATIENT DECLINED
ADEQUATE_TO_COMPLETE_ADL: YES
WALKS IN HOME: INDEPENDENT
TOILETING: INDEPENDENT
TOILETING: INDEPENDENT
HEARING - RIGHT EAR: HEARING AID
DRESSING YOURSELF: INDEPENDENT
GROOMING: INDEPENDENT
ADEQUATE_TO_COMPLETE_ADL: YES
HEARING - LEFT EAR: HEARING AID
WALKS IN HOME: INDEPENDENT
FEEDING YOURSELF: INDEPENDENT

## 2024-06-15 ASSESSMENT — LIFESTYLE VARIABLES
HOW OFTEN DO YOU HAVE 6 OR MORE DRINKS ON ONE OCCASION: NEVER
AUDIT-C TOTAL SCORE: 0
AUDIT-C TOTAL SCORE: 0
SKIP TO QUESTIONS 9-10: 1
PRESCIPTION_ABUSE_PAST_12_MONTHS: NO
PRESCIPTION_ABUSE_PAST_12_MONTHS: NO
HOW OFTEN DO YOU HAVE A DRINK CONTAINING ALCOHOL: NEVER
SUBSTANCE_ABUSE_PAST_12_MONTHS: NO
HOW MANY STANDARD DRINKS CONTAINING ALCOHOL DO YOU HAVE ON A TYPICAL DAY: PATIENT DOES NOT DRINK
SKIP TO QUESTIONS 9-10: 1
AUDIT-C TOTAL SCORE: 0
HOW OFTEN DO YOU HAVE 6 OR MORE DRINKS ON ONE OCCASION: NEVER
HOW MANY STANDARD DRINKS CONTAINING ALCOHOL DO YOU HAVE ON A TYPICAL DAY: PATIENT DOES NOT DRINK
AUDIT-C TOTAL SCORE: 0
HOW OFTEN DO YOU HAVE A DRINK CONTAINING ALCOHOL: NEVER
SUBSTANCE_ABUSE_PAST_12_MONTHS: NO

## 2024-06-15 ASSESSMENT — PAIN SCALES - GENERAL
PAINLEVEL_OUTOF10: 0 - NO PAIN

## 2024-06-15 ASSESSMENT — PAIN - FUNCTIONAL ASSESSMENT
PAIN_FUNCTIONAL_ASSESSMENT: 0-10

## 2024-06-15 ASSESSMENT — PAIN DESCRIPTION - PROGRESSION: CLINICAL_PROGRESSION: NOT CHANGED

## 2024-06-15 NOTE — H&P
History Of Present Illness  Herminio Devlin is a 84 y.o. male presenting with shortness of breath.  Patient has history of coronary artery disease, hypertension, hyperlipidemia, BPH presents to the emergency room with with shortness of breath.  Patient actually came in this morning with nausea and vomiting.  He said he vomited 3 times and that it looked a little red.  He came to the emergency room.  He denied any pain at that time.  He said he felt overall terrible because he did not sleep last night.  He had troponins drawn that went from 70-81.  He had no ACS symptoms.  He was discharged home when he was feeling better.  He went home and then he started feeling short of breath.  The ER doctor told him to come back if he felt short of breath at all or if anything was different or worse.  So he came back to the hospital.  Repeat troponin now is 91.  EKG shows diffuse ST depression that was not previous on prior EKG.  Case was discussed with cardiologist Dr. Salvador.  Patient was started on oral aspirin, IV heparin drip, nitroglycerin ointment.  Dr. Salvador recommended transfer to [facility] for possible heart catheterization.  There are no beds and patient is in the meantime admitted here.    When I talk to the patient he has no current complaints other than feeling shaky.  His son notes that he was lightheaded earlier with walking.  Patient reports that he has black stool sometimes but says he has not had a bowel movement in a few days.  He reports taking Aleve daily.  He denies any history of gastric ulcers.  On room air and not short of breath.  He is tachycardic.     Past Medical History  He has a past medical history of Atherosclerotic heart disease of native coronary artery without angina pectoris (10/25/2022), Disorder of prostate, unspecified, Essential (primary) hypertension (12/02/2020), Hyperlipidemia, unspecified (10/25/2022), Occlusion and stenosis of unspecified carotid artery (10/25/2022), and  Personal history of other diseases of urinary system.    Surgical History  He has a past surgical history that includes Other surgical history (02/24/2014); Coronary artery bypass graft (11/09/2015); and Other surgical history (02/01/2016).     Social History  He reports that he quit smoking about 30 years ago. His smoking use included cigarettes. He started smoking about 71 years ago. He has a 41 pack-year smoking history. He has been exposed to tobacco smoke. He has never used smokeless tobacco. He reports that he does not drink alcohol and does not use drugs.    Family History  Family History   Problem Relation Name Age of Onset    Coronary artery disease Brother          Allergies  Patient has no known allergies.    Review of Systems  General: no fatigue, no malaise, no fevers/chills   HENT: no rhinorrhea, no sore throat, no ear pain   Eyes: no change in vision, denies eye pain or discharge   Lungs: + SOB, no cough, no hemoptysis   CV: no chest pain, no palpitations, no leg edema   Abd: + nausea/vomiting, no constipation/diarrhea, no abdominal pain   : no dysuria, no frequency, no nocturia, no flank pain   Endocrine: no polydipsia/polyuria, no hot or cold intolerance   Neuro: no headaches, no syncope, no seizures   MSK: no back pain, no neck pain, no joint problems   Psych: no anxiety, no depression, no hallucinations       Physical Exam  General: alert, no diaphoresis   HENT: mucous membranes moist, external ears normal, no rhinorrhea   Eyes: no icterus or injection, no discharge   Lungs: CTA BL   Heart: Regular and tachycardic, no LE edema BL   GI: abdomen soft, nontender, nondistended, BS present   MSK: no joint effusion or deformity   Skin: no rashes, erythema, or ecchymosis   Neuro: grossly normal cognition, motor strength, sensation     Last Recorded Vitals  /76 (BP Location: Right arm, Patient Position: Sitting)   Pulse (!) 120   Temp 36.8 °C (98.2 °F)   Resp 20   Wt 83 kg (182 lb 15.7 oz)    SpO2 96%     Relevant Results             Assessment/Plan   Principal Problem:    NSTEMI (non-ST elevated myocardial infarction) (Multi)    NSTEMI  - ST depression diffuse on EKG. with slowly elevated troponins.  -Patient on heparin, aspirin.  Madeleine is aware of the case and requested transfer to [facility]  -I am concerned that possibly the patient is actually having an active GI bleed and this could be causing the changes in EKG as well as his shortness of breath and troponin elevation  -Discussed with Dr. Salvador my concern that patient is actually an upper GI bleed.  He is okay with stopping heparin at this time.  Still to transfer to [facility] in case he has cardiac needs    Suspected upper GI bleed  Acute posthemorrhagic anemia  - hemoccult positive Dr. Brewer reports dark stool on rectal exam  - patient takes Aleve daily in addition to baby aspirin and plavix. BUN markedly high, and higher than it was last night  - repeat H/H stat with type and screen  - start PPI IV BID, 80 mg IV x1 as first dose  -Discussed with GI nurse practitioner.  She is aware of the consult.    HTN  - hold antihypertensives    BPH  - flomax    Chronic pain  - gabapentin    Gout  - allopurinol    FULL CODE-- discussed with patient.    Patient high risk of deterioration. Monitor closely in SDU. Awaiting transfer to .   I updated patient and wife regarding my concerns about GI bleeding.  Awaiting recheck labs now.  I also updated Dr. Shaw at [facility], he was the admitting physician per transfer center.  He is aware of the changes in the clinical course.    CCT: 75 min    Addendum: 1758  Repeat blood work shows hemoglobin now 7.4. Patient tachycardic. PTT >139.  Will give 1 unit prbc. I discussed consent with patient and he agreed, signed consent form.  Regarding PTT, heparin stopped at 1718 but given how high the PTT, I am considering given protamine. Will recheck stat PTT. Discussed protamine dosing with pharmacy-- will give 20 mg as  piggyback.    Additional CCT: 35 min         Jaki Gamez DO

## 2024-06-15 NOTE — DISCHARGE INSTRUCTIONS
You are seen in the emergency department today for nausea and vomiting.  We treated you with IV fluids and a nausea medication.  As discussed, your cardiac enzyme is slightly elevated today.  Since you are not having chest pain at this time and your EKG is reassuring, I am releasing you back home.  If at any point time you develop chest pain I do recommend that you come back to the emergency primary for reevaluation.  I do recommend that you follow-up with Dr. Yeboah regarding your elevated enzymes.  If you have any other concerns, you can return to the emergency department.

## 2024-06-15 NOTE — CONSULTS
"Consults  History Of Present Illness:    Herminio Devlin is a 84 y.o. male presenting with shortness of breath. Patient has history of coronary artery disease, hypertension, hyperlipidemia, BPH presents to the emergency room with with shortness of breath. Patient actually came in this morning with nausea and vomiting. He said he vomited 3 times and that it looked a little red. He came to the emergency room. He denied any pain at that time. He said he felt overall terrible because he did not sleep last night. He had troponins drawn that went from 70-81. He had no ACS symptoms. He was discharged home when he was feeling better. He went home and then he started feeling short of breath. The ER doctor told him to come back if he felt short of breath at all or if anything was different or worse. So he came back to the hospital. Repeat troponin now is 91. EKG shows diffuse ST depression that was not previous on prior EKG. Case was discussed with cardiologist Dr. Salvador. Patient was started on oral aspirin, IV heparin drip, nitroglycerin ointment.  Patient denies any symptoms of chest discomfort or shortness of breath.  His wife and 3 sons were at the bedside.  Telemetry patient remains in sinus tachycardia to 100 bpm     Last Recorded Vitals:  Vitals:    06/15/24 1459 06/15/24 1552 06/15/24 1643 06/15/24 1745   BP: 130/71 127/76 124/59 115/61   BP Location: Right arm Right arm  Left arm   Patient Position: Sitting Sitting  Lying   Pulse: (!) 120  (!) 102 104   Resp: 20 20 13 14   Temp: 36.6 °C (97.9 °F) 36.8 °C (98.2 °F) 36.8 °C (98.2 °F) 36.3 °C (97.3 °F)   TempSrc: Oral   Temporal   SpO2: 96% 96% 96% 97%   Weight: 83 kg (182 lb 15.7 oz)   82.9 kg (182 lb 12.2 oz)   Height: 1.676 m (5' 6\")   1.651 m (5' 5\")       Last Labs:  CBC - 6/15/2024:  5:23 PM  11.3 7.4 236    23.3      CMP - 6/15/2024:  3:18 PM  9.1 6.5 20 --- 0.3   _ 3.9 11 58      PTT - 6/15/2024:  6:14 PM  _   _ 64.2     Hemoglobin A1C   Date/Time Value Ref Range " Status   05/13/2024 08:15 AM 5.7 (H) See below % Final   09/25/2023 08:08 AM 5.7 4.0 - 6.0 % Final     Comment:     Hemoglobin A1C levels are related to mean blood glucose during the   preceding 2-3 months. The relationship table below may be used as a   general guide. Each 1% increase in HGB A1C is a reflection of an   increase in mean glucose of approximately 30 mg/dl.   Reference: Diabetes Care, volume 29, supplement 1 Jan. 2006                        HGB A1C ................. Approx. Mean Glucose   _______________________________________________   6%   ...............................  120 mg/dl   7%   ...............................  150 mg/dl   8%   ...............................  180 mg/dl   9%   ...............................  210 mg/dl   10%  ...............................  240 mg/dl  Performed at [address] [address]     03/20/2023 08:45 AM 5.9 4.0 - 6.0 % Final     Comment:     Hemoglobin A1C levels are related to mean blood glucose during the   preceding 2-3 months. The relationship table below may be used as a   general guide. Each 1% increase in HGB A1C is a reflection of an   increase in mean glucose of approximately 30 mg/dl.   Reference: Diabetes Care, volume 29, supplement 1 Jan. 2006                        HGB A1C ................. Approx. Mean Glucose   _______________________________________________   6%   ...............................  120 mg/dl   7%   ...............................  150 mg/dl   8%   ...............................  180 mg/dl   9%   ...............................  210 mg/dl   10%  ...............................  240 mg/dl  Performed at [address] [facility] [address]     07/27/2020 11:42 AM 5.9 (H) 4.3 - 5.6 % Final     Comment:     American Diabetes Association guidelines indicate that patients with HgbA1c in   the range 5.7-6.4% are at increased risk for development of diabetes, and   intervention by lifestyle modification may  be beneficial. HgbA1c greater or   equal to 6.5% is considered diagnostic of diabetes.     LDL Calculated   Date/Time Value Ref Range Status   05/13/2024 08:15 AM 18 (L) 65 - 130 mg/dL Final   09/25/2023 08:08 AM 4 (L) 65 - 130 MG/DL Final   03/20/2023 08:45 AM 9 (L) 65 - 130 MG/DL Final   09/15/2022 08:43 AM 10 (L) 65 - 130 MG/DL Final     VLDL   Date/Time Value Ref Range Status   11/04/2020 08:20 AM 37 0 - 40 mg/dL Final   08/04/2020 08:15 AM 46 (H) 0 - 40 mg/dL Final   05/28/2020 08:13 AM 35 0 - 40 mg/dL Final      Last I/O:  No intake/output data recorded.    Past Cardiology Tests (Last 3 Years):  EKG:  No results found for this or any previous visit from the past 1095 days.    Echo:  Transthoracic Echo (TTE) Complete 03/11/2024    Ejection Fractions:  EF   Date/Time Value Ref Range Status   03/11/2024 01:41 PM 66 %      Cath:  No results found for this or any previous visit from the past 1095 days.    Stress Test:  No results found for this or any previous visit from the past 1095 days.    Cardiac Imaging:  No results found for this or any previous visit from the past 1095 days.      Past Medical History:  He has a past medical history of Atherosclerotic heart disease of native coronary artery without angina pectoris (10/25/2022), Disorder of prostate, unspecified, Essential (primary) hypertension (12/02/2020), Hyperlipidemia, unspecified (10/25/2022), Occlusion and stenosis of unspecified carotid artery (10/25/2022), and Personal history of other diseases of urinary system.    Past Surgical History:  He has a past surgical history that includes Other surgical history (02/24/2014); Coronary artery bypass graft (11/09/2015); and Other surgical history (02/01/2016).      Social History:  He reports that he quit smoking about 30 years ago. His smoking use included cigarettes. He started smoking about 71 years ago. He has a 41 pack-year smoking history. He has been exposed to tobacco smoke. He has never used  smokeless tobacco. He reports that he does not drink alcohol and does not use drugs.    Family History:  Family History   Problem Relation Name Age of Onset    Coronary artery disease Brother          Allergies:  Patient has no known allergies.    Inpatient Medications:  Scheduled medications   Medication Dose Route Frequency    [START ON 6/16/2024] allopurinol  300 mg oral Daily    gabapentin  600 mg oral TID    [START ON 6/16/2024] pantoprazole  40 mg intravenous BID     PRN medications   Medication    acetaminophen    prochlorperazine     Continuous Medications   Medication Dose Last Rate     Outpatient Medications:  Current Outpatient Medications   Medication Instructions    acetaminophen (TylenoL) 325 mg tablet 1-2 tablets, oral, every 4-6 hours as needed    allopurinol (ZYLOPRIM) 300 mg, oral, Daily    amLODIPine (NORVASC) 10 mg, oral, Daily    aspirin 81 mg, oral, Daily    clopidogrel (PLAVIX) 75 mg, oral, Daily    docusate sodium (COLACE) 100 mg, oral, Daily    evolocumab (Repatha SureClick) 140 mg/mL injection INJECT 140 MG (1 PEN) UNDER THE SKIN ONCE EVERY 2 WEEKS AS DIRECTED.    gabapentin (NEURONTIN) 600 mg, oral, 3 times daily    geriatric multivit-iron-mins (Centravites 50 Plus) tablet as directed Orally    hydrocortisone (Cortizone-10) 1 % cream USE AS DIRECTED    isosorbide mononitrate ER (IMDUR) 30 mg, oral, Daily    isosorbide mononitrate ER (IMDUR) 60 mg, oral, Daily    lovastatin (MEVACOR) 40 mg, oral, 2 times daily    metoprolol succinate XL (TOPROL-XL) 100 mg, oral, Daily    multivitamin (MULTIPLE VITAMINS ORAL) 1 tablet, oral, Daily    naproxen sodium (ALEVE) 220 mg, oral, 2 times daily (morning and late afternoon)    omega 3-dha-epa-fish oil (Fish OiL) 1,000 mg (120 mg-180 mg) capsule 1 capsule, oral, Daily    ondansetron ODT (ZOFRAN-ODT) 4 mg, oral, Every 8 hours PRN    psyllium husk, aspartame, (Metamucil Sugar-Free, aspart,) 3.4 gram/5.8 gram powder 2 Scoops, oral, Daily RT    tamsulosin  (FLOMAX) 0.4 mg, oral, Daily     Review of systems contusional, cardiopulmonary, GI,  musculoskeletal neuroendocrine and psych were reviewed and no other pertinent findings noted other than the fact that were mentioned HPI and past medical history.  He usually follows with Dr. Fidencio Villa for his primary care needs and saw him just not too long ago and usually follows with Dr. Yeboah for his cardiac care  Physical Exam:  HEENT PERRLA neck is supple lungs clear to auscultation bilaterally with good air entry heart S1-S2 present with murmur best heard in the aortic area abdomen is soft and nontender extremity shows no evidence of pedal edema neuro is grossly nonfocal patient is hard in hearing.     Assessment/Plan   #1 shortness of breath secondary to severe anemia which in turn is secondary to upper GI bleeding based on the fact that patient was having black stools prior to this hospitalization.  Patient did received IV Protonix 80 mg earlier today.  Will be receiving blood transfusion 1 unit in the next few hours.  Will supplement oxygen 2 L via nasal cannula.  2.  Weighted high-sensitivity-troponins indicating type II myocardial infarction secondary to severe anemia.  Patient has history of coronary artery bypass surgery and usually follows with Dr. Yeboah for his cardiac care.  Patient initiated on IV heparin for elevated troponins and that is been discontinued and patient did receive protamine to reverse effect of the IV heparin and the plan would be to continue closely monitoring his hematocrit and but once a bed is available he will be transferred to [facility] for more close monitoring and possible need for urgent EGD should in case his hemoglobin continues to drop.  Will hold off all antiplatelet agents at this time and will continue patient on Nitropaste as blood pressure tolerates.  The patient as well as the patient's wife and 3 sons at the bedside the plan of care  Site Assessment  Clean;Dry;Intact 06/15/24 1740   Dressing Type Transparent 06/15/24 1740   Line Status Capped;Blood return noted 06/15/24 1740   Dressing Status Clean;Dry;Occlusive 06/15/24 1740   Number of days: 0       Code Status:  Full Code    I spent 35 minutes in the professional and overall care of this patient.        Tomas Flores MD

## 2024-06-15 NOTE — ED PROVIDER NOTES
HPI   Chief Complaint   Patient presents with    Nausea    Vomiting     Patient bib ems for co nausea and vomiting. Per ems patient has not been feeling well since last night. Patient stated he's had 3 episodes of vomiting. Patient does have cardiac hx.        84 male presents with nausea and vomiting.  Patient states that he had difficulty sleeping last night for an unknown reason.  He states this morning he started vomiting, vomited approximately 3 times.  Has residual nausea.  No blood within the vomit.  Never had chest pain or abdominal pain.  Denies current chest pain or abdominal pain.  No diarrhea.  No fevers or chills.  History of coronary bypass, follows with Dr. Yeboah                          No data recorded                   Patient History   Past Medical History:   Diagnosis Date    Atherosclerotic heart disease of native coronary artery without angina pectoris 10/25/2022    Coronary atherosclerosis    Disorder of prostate, unspecified     Prostate disorder    Essential (primary) hypertension 2020    Hypertension    Hyperlipidemia, unspecified 10/25/2022    Hyperlipidemia    Occlusion and stenosis of unspecified carotid artery 10/25/2022    Carotid artery calcification    Personal history of other diseases of urinary system     H/O bladder problems     Past Surgical History:   Procedure Laterality Date    CORONARY ARTERY BYPASS GRAFT  2015    CABG    OTHER SURGICAL HISTORY  2014    PTA Carotid Artery    OTHER SURGICAL HISTORY  2016    Previous Stent Placement     No family history on file.  Social History     Tobacco Use    Smoking status: Former     Types: Cigarettes     Start date:      Quit date: 1953     Years since quittin.5     Passive exposure: Past    Smokeless tobacco: Never   Vaping Use    Vaping status: Never Used   Substance Use Topics    Alcohol use: Never    Drug use: Never       Physical Exam   ED Triage Vitals [06/15/24 0810]   Temperature Heart Rate  Respirations BP   36.6 °C (97.9 °F) 95 16 152/69      Pulse Ox Temp Source Heart Rate Source Patient Position   99 % Temporal Monitor --      BP Location FiO2 (%)     -- --       Physical Exam  Vitals and nursing note reviewed.   Constitutional:       General: He is not in acute distress.     Appearance: He is not ill-appearing.   HENT:      Head: Normocephalic and atraumatic.      Mouth/Throat:      Mouth: Mucous membranes are moist.      Pharynx: Oropharynx is clear.   Eyes:      Extraocular Movements: Extraocular movements intact.      Conjunctiva/sclera: Conjunctivae normal.      Pupils: Pupils are equal, round, and reactive to light.   Cardiovascular:      Rate and Rhythm: Normal rate and regular rhythm.   Pulmonary:      Effort: Pulmonary effort is normal. No respiratory distress.      Breath sounds: Normal breath sounds.   Abdominal:      General: There is no distension.      Palpations: Abdomen is soft.      Tenderness: There is no abdominal tenderness. There is no guarding or rebound.   Musculoskeletal:         General: No swelling or deformity. Normal range of motion.      Cervical back: Normal range of motion and neck supple.      Right lower le+ Edema present.      Left lower le+ Edema present.   Skin:     General: Skin is warm and dry.      Capillary Refill: Capillary refill takes less than 2 seconds.   Neurological:      General: No focal deficit present.      Mental Status: He is alert and oriented to person, place, and time. Mental status is at baseline.   Psychiatric:         Mood and Affect: Mood normal.         Behavior: Behavior normal.         ED Course & MDM   ED Course as of 06/15/24 1241   Sat Donn 15, 2024   0828 ECG 12 lead  Performed at  0829, HR of 89, NSR, NAD, QTc 452, no sign of STEMI, no Q wave or T wave abnormality noted.    Reviewed and interpreted by me at time performed   [ELROY]   0901 HEMOGLOBIN(!): 10.1  2 point drop in 8 months [ELROY]      ED Course User Index  [ELROY] Jojo  MANDO Brewer MD         Diagnoses as of 06/15/24 1241   Nausea and vomiting, unspecified vomiting type       Medical Decision Making  84 y.o. male presents with N/V.  I have considered the following conditions during my assessment of this patient's nausea and vomiting: Viral gastritis, GERD, influenza, food poisoning, ileus, bowel obstruction (mechanical, volvulus, intussusception, adhesions, strangulated or incarcerated hernia), acute appendicitis, meningitis, dehydration, electrolyte abnormalities.  Patient without any chest pain or abdominal pain.  Given patient's cardiac history, troponin and EKG ordered.  EKG without evidence of ischemia.  Troponin initially 70, repeat 81.  Patient remains chest pain-free, at this time less likely to be ischemic event.  Labs without significant abnormality in electrolytes or renal function.  Patient with a mildly decreased hemoglobin compared to 8 months ago but above the threshold for transfusion.  Patient treated with IV fluids as well as IV Zofran with improvement in nausea.  No vomiting here in the emergency department.  Patient feeling improved, recommend patient follow-up outpatient with primary care doctor as well as cardiology for elevated troponin.        Procedure  Procedures     Jojo Brewer MD  06/15/24 3354

## 2024-06-15 NOTE — NURSING NOTE
Patient admitted to 427 from ER for co SOB, r/o MI, r/o GIB.  Per md order, iv heparin discontinued, iv protonix given, stat labs obtained.

## 2024-06-15 NOTE — DISCHARGE SUMMARY
Discharge Diagnosis  NSTEMI (non-ST elevated myocardial infarction) (Multi)  Symptomatic anemia    Issues Requiring Follow-Up      Test Results Pending At Discharge  Pending Labs       Order Current Status    Serial Troponin, 2 Hour ([facility]) Collected (06/15/24 1011)    Troponin T Series, High Sensitivity (0, 2HR, 6HR) In process            Hospital Course    Patient presented to [facility] on Mile [facility], 2024 with chief complaint of dyspnea.  Patient has history of coronary artery disease, hypertension, hyperlipidemia, BPH presents to the emergency room with with shortness of breath. Patient actually came in this morning with nausea and vomiting. He said he vomited 3 times and that it looked a little red. He came to the emergency room. He denied any pain at that time. He said he felt overall terrible because he did not sleep last night. He had troponins drawn that went from 70-81. He had no ACS symptoms. He was discharged home when he was feeling better. He went home and then he started feeling short of breath. The ER doctor told him to come back if he felt short of breath at all or if anything was different or worse. So he came back to the hospital.  Patient was subsequently admitted by Dr. Gamez the hospitalist group.  Repeat troponin now is 91. EKG shows diffuse ST depression that was not previous on prior EKG. Case was discussed with cardiologist Dr. Salvador. Patient was started on oral aspirin, IV heparin drip, nitroglycerin ointment. Dr. Salvador recommended transfer to [facility] for possible heart catheterization. There are no beds and patient is in the meantime admitted here.       Shortly after admission the patient's hospital course was complicated by vomiting.  Patient stated he vomited blood.  He also mentions that he has lightheadedness with walking and dark tarry stools.  Not had a bowel movement in a few days.  He reports taking NSAIDs specifically Aleve.  Patient's repeat H&H was  noted for a drop.  Hemoglobin was 7.4.  Patient's PTT was elevated greater than 139.  Decision made to administer 1 unit PRBC to the patient.  Patient was administered protamine.  Patient was subsequently excepted by Dr. Shaw at [facility].  Patient currently remains on telemetry monitoring.  Remains clinically hemodynamically stable.  Will be transferred by ALS.    Patient was also noted to be seen by Dr. Flores while at [facility].  He was noted to have dyspnea secondary symptomatic anemia.  Type II myocardial infarction due to demand ischemia.  He agreed with IV heparin being held.  Patient will be closely monitored by gastroenterology at [facility] [place] and by cardiology for further evaluation.  Hold off on all antiplatelet medications and patient administered Nitropaste.  Family was at the bedside with cardiology and agreed to the plan.    Pertinent Physical Exam At Time of Discharge      Physical Exam    Physical Exam  General: alert, no diaphoresis   HENT: mucous membranes moist, external ears normal, no rhinorrhea   Eyes: no icterus or injection, no discharge   Lungs: CTA BL   Heart: Regular and tachycardic, no LE edema BL   GI: abdomen soft, nontender, nondistended, BS present   MSK: no joint effusion or deformity   Skin: no rashes, erythema, or ecchymosis   Neuro: grossly normal cognition, motor strength, sensation    Home Medications     Medication List      ASK your doctor about these medications     Aleve 220 mg tablet; Generic drug: naproxen sodium   allopurinol 300 mg tablet; Commonly known as: Zyloprim; Take 1 tablet   (300 mg) by mouth once daily.   amLODIPine 10 mg tablet; Commonly known as: Norvasc; Take 1 tablet (10   mg) by mouth once daily.   aspirin 81 mg EC tablet   Centravites 50 Plus tablet; Generic drug: geriatric multivit-iron-mins   clopidogrel 75 mg tablet; Commonly known as: Plavix; Take 1 tablet (75   mg) by mouth once daily.   Colace 100 mg capsule;  Generic drug: docusate sodium   Cortizone-10 1 % cream; Generic drug: hydrocortisone   Fish OiL 1,000 mg (120 mg-180 mg) capsule; Generic drug: omega   3-dha-epa-fish oil   gabapentin 600 mg tablet; Commonly known as: Neurontin; Take 1 tablet   (600 mg) by mouth 3 times a day.   * isosorbide mononitrate ER 30 mg 24 hr tablet; Commonly known as:   Imdur; Take 1 tablet (30 mg) by mouth once daily.   * isosorbide mononitrate ER 60 mg 24 hr tablet; Commonly known as:   Imdur; Take 1 tablet (60 mg) by mouth once daily.   lovastatin 40 mg tablet; Commonly known as: Mevacor; Take 1 tablet (40   mg) by mouth 2 times a day.   Metamucil Sugar-Free (aspart) 3.4 gram/5.8 gram powder; Generic drug:   psyllium husk (aspartame)   metoprolol succinate  mg 24 hr tablet; Commonly known as:   Toprol-XL; Take 1 tablet (100 mg) by mouth once daily.   MULTIPLE VITAMINS ORAL   ondansetron ODT 4 mg disintegrating tablet; Commonly known as:   Zofran-ODT; Take 1 tablet (4 mg) by mouth every 8 hours if needed for   nausea or vomiting for up to 7 days.   Repatha SureClick 140 mg/mL injection; Generic drug: evolocumab; INJECT   140 MG (1 PEN) UNDER THE SKIN ONCE EVERY 2 WEEKS AS DIRECTED.   tamsulosin 0.4 mg 24 hr capsule; Commonly known as: Flomax   TylenoL 325 mg tablet; Generic drug: acetaminophen  * This list has 2 medication(s) that are the same as other medications   prescribed for you. Read the directions carefully, and ask your doctor or   other care provider to review them with you.       Outpatient Follow-Up  Future Appointments   Date Time Provider Department Center   12/2/2024  2:30 PM Donald Price MD [id number] Psych[facility]       Dave Altman MD

## 2024-06-15 NOTE — ED PROVIDER NOTES
HPI   Chief Complaint   Patient presents with    Shortness of Breath     X 2 days of increased SOB, vomit this morning, no dizziness, more SOB and dizzy with exertion, no SOB, cough and congestion today, no diarrhea        84-year-old male presents with shortness of breath.  Patient was seen by me earlier this morning for nausea and vomiting.  Patient was given strict return precautions to return to the emergency department if he had worsening symptoms.  Family states the patient felt well initially upon returning home but then became progressively short of breath with increased ill feeling.  Increased nausea but no vomiting.  Patient denies active chest pain at this time.  No abdominal pain.                          No data recorded HEART Score: 8                   Patient History   Past Medical History:   Diagnosis Date    Atherosclerotic heart disease of native coronary artery without angina pectoris 10/25/2022    Coronary atherosclerosis    Disorder of prostate, unspecified     Prostate disorder    Essential (primary) hypertension 2020    Hypertension    Hyperlipidemia, unspecified 10/25/2022    Hyperlipidemia    Occlusion and stenosis of unspecified carotid artery 10/25/2022    Carotid artery calcification    Personal history of other diseases of urinary system     H/O bladder problems     Past Surgical History:   Procedure Laterality Date    CORONARY ARTERY BYPASS GRAFT  2015    CABG    OTHER SURGICAL HISTORY  2014    PTA Carotid Artery    OTHER SURGICAL HISTORY  2016    Previous Stent Placement     Family History   Problem Relation Name Age of Onset    Coronary artery disease Brother       Social History     Tobacco Use    Smoking status: Former     Current packs/day: 0.00     Average packs/day: 1 pack/day for 41.0 years (41.0 ttl pk-yrs)     Types: Cigarettes     Start date:      Quit date:      Years since quittin.4     Passive exposure: Past    Smokeless tobacco: Never    Vaping Use    Vaping status: Never Used   Substance Use Topics    Alcohol use: Never     Comment: quit drinking many years ago    Drug use: Never       Physical Exam   ED Triage Vitals [06/15/24 1459]   Temperature Heart Rate Respirations BP   36.6 °C (97.9 °F) (!) 120 20 130/71      Pulse Ox Temp Source Heart Rate Source Patient Position   96 % Oral Monitor Sitting      BP Location FiO2 (%)     Right arm --       Physical Exam  Vitals and nursing note reviewed. Exam conducted with a chaperone present.   Constitutional:       General: He is not in acute distress.     Appearance: He is not ill-appearing.   HENT:      Head: Normocephalic and atraumatic.      Mouth/Throat:      Mouth: Mucous membranes are moist.      Pharynx: Oropharynx is clear.   Eyes:      Extraocular Movements: Extraocular movements intact.      Conjunctiva/sclera: Conjunctivae normal.      Pupils: Pupils are equal, round, and reactive to light.   Cardiovascular:      Rate and Rhythm: Regular rhythm. Tachycardia present.   Pulmonary:      Effort: Pulmonary effort is normal. No respiratory distress.      Breath sounds: Normal breath sounds.   Abdominal:      General: There is no distension.      Palpations: Abdomen is soft.      Tenderness: There is no abdominal tenderness.   Genitourinary:     Rectum: Guaiac result positive.   Musculoskeletal:         General: No swelling or deformity. Normal range of motion.      Cervical back: Normal range of motion and neck supple.   Skin:     General: Skin is warm and dry.      Capillary Refill: Capillary refill takes less than 2 seconds.   Neurological:      General: No focal deficit present.      Mental Status: He is alert and oriented to person, place, and time. Mental status is at baseline.   Psychiatric:         Mood and Affect: Mood normal.         Behavior: Behavior normal.         ED Course & MDM   ED Course as of 06/15/24 1652   Sat Donn 15, 2024   1505 ECG 12 Lead  Performed at  1505, HR of 107, NSR,  NAD, QTc 469, no sign of STEMI, diffuse ST depression with ST elevation in aVR    Reviewed and interpreted by me at time performed   []   1536 HEMOGLOBIN(!): 8.8  2 pt drop from this morning [JM]   1603 Troponin T, High Sensitivity(!!): 94  Initial trops earlier were 70->81 [JM]      ED Course User Index  [JM] Jojo Brewer MD         Diagnoses as of 06/15/24 1652   NSTEMI (non-ST elevated myocardial infarction) (Multi)   Shortness of breath       Medical Decision Making  84 y.o. male who presents to the ED with shortness of breath.  Considered wide ddx for pt's presentation, including aortic dissection, pneumothorax, pneumonia, pleurisy, COPD/asthma exacerbation, pleural effusion, CHF, pericarditis, myocarditis, endocarditis, pericardial effusion/tamponade, musculoskeletal chest pain.  This is a return visit from the ED earlier this morning.  Repeat EKG on arrival is markedly different from prior, showing significant ST depressions as well as marked ST elevation in aVR.   Troponin continues to rise, repeat here is 90.     HEART SCORE: 8    The patient still continues to deny chest pain.  Notable tachycardia.  Given drop in hemoglobin, rectal exam performed.  Patient with dark stool on the glove.  Sent for guaiac testing with lab.  Spoke with cardiology regarding patient's presentation, concern for possible EKG changes and would like him transferred over to [facility] for possible cardiac cath.  At this time [facility] is full.  Given this, will board the patient here at [facility] for cardiology evaluation and final decision on whether or not the patient requires a catheterization.  Transfer request still remains open and patient will be transferred once a bed becomes available.  Patient started on heparin, treated with aspirin and nitroglycerin with improvement in his symptoms.  Patient admitted to hospitalist in stable condition.        Procedure  Procedures     Jojo Brewer MD  06/15/24  [id number]

## 2024-06-16 LAB
ALBUMIN SERPL-MCNC: 3.4 G/DL (ref 3.5–5)
ALP BLD-CCNC: 35 U/L (ref 35–125)
ALT SERPL-CCNC: 8 U/L (ref 5–40)
ANION GAP SERPL CALC-SCNC: 10 MMOL/L
AST SERPL-CCNC: 16 U/L (ref 5–40)
BILIRUB SERPL-MCNC: 0.8 MG/DL (ref 0.1–1.2)
BLOOD EXPIRATION DATE: NORMAL
BUN SERPL-MCNC: 77 MG/DL (ref 8–25)
CALCIUM SERPL-MCNC: 8.7 MG/DL (ref 8.5–10.4)
CHLORIDE SERPL-SCNC: 111 MMOL/L (ref 97–107)
CO2 SERPL-SCNC: 23 MMOL/L (ref 24–31)
CREAT SERPL-MCNC: 1.2 MG/DL (ref 0.4–1.6)
DISPENSE STATUS: NORMAL
EGFRCR SERPLBLD CKD-EPI 2021: 60 ML/MIN/1.73M*2
ERYTHROCYTE [DISTWIDTH] IN BLOOD BY AUTOMATED COUNT: 17.4 % (ref 11.5–14.5)
ERYTHROCYTE [DISTWIDTH] IN BLOOD BY AUTOMATED COUNT: 18.5 % (ref 11.5–14.5)
GLUCOSE BLD MANUAL STRIP-MCNC: 105 MG/DL (ref 74–99)
GLUCOSE BLD MANUAL STRIP-MCNC: 106 MG/DL (ref 74–99)
GLUCOSE SERPL-MCNC: 130 MG/DL (ref 65–99)
HCT VFR BLD AUTO: 22.1 % (ref 41–52)
HCT VFR BLD AUTO: 22.4 % (ref 41–52)
HCT VFR BLD AUTO: 23.1 % (ref 41–52)
HCT VFR BLD AUTO: 23.7 % (ref 41–52)
HCT VFR BLD AUTO: 23.7 % (ref 41–52)
HGB BLD-MCNC: 7.2 G/DL (ref 13.5–17.5)
HGB BLD-MCNC: 7.3 G/DL (ref 13.5–17.5)
HGB BLD-MCNC: 7.7 G/DL (ref 13.5–17.5)
MCH RBC QN AUTO: 31.2 PG (ref 26–34)
MCH RBC QN AUTO: 32 PG (ref 26–34)
MCHC RBC AUTO-ENTMCNC: 32.5 G/DL (ref 32–36)
MCHC RBC AUTO-ENTMCNC: 32.6 G/DL (ref 32–36)
MCV RBC AUTO: 96 FL (ref 80–100)
MCV RBC AUTO: 98 FL (ref 80–100)
NRBC BLD-RTO: 0 /100 WBCS (ref 0–0)
NRBC BLD-RTO: 0 /100 WBCS (ref 0–0)
PLATELET # BLD AUTO: 199 X10*3/UL (ref 150–450)
PLATELET # BLD AUTO: 202 X10*3/UL (ref 150–450)
POTASSIUM SERPL-SCNC: 4 MMOL/L (ref 3.4–5.1)
PRODUCT BLOOD TYPE: 9500
PRODUCT CODE: NORMAL
PROT SERPL-MCNC: 5.1 G/DL (ref 5.9–7.9)
RBC # BLD AUTO: 2.34 X10*6/UL (ref 4.5–5.9)
RBC # BLD AUTO: 2.41 X10*6/UL (ref 4.5–5.9)
SODIUM SERPL-SCNC: 144 MMOL/L (ref 133–145)
UNIT ABO: NORMAL
UNIT NUMBER: NORMAL
UNIT RH: NORMAL
UNIT VOLUME: 350
WBC # BLD AUTO: 10.3 X10*3/UL (ref 4.4–11.3)
WBC # BLD AUTO: 9.4 X10*3/UL (ref 4.4–11.3)
XM INTEP: NORMAL

## 2024-06-16 PROCEDURE — 36415 COLL VENOUS BLD VENIPUNCTURE: CPT | Performed by: INTERNAL MEDICINE

## 2024-06-16 PROCEDURE — 2060000001 HC INTERMEDIATE ICU ROOM DAILY

## 2024-06-16 PROCEDURE — 80053 COMPREHEN METABOLIC PANEL: CPT | Performed by: INTERNAL MEDICINE

## 2024-06-16 PROCEDURE — 85014 HEMATOCRIT: CPT | Mod: 91 | Performed by: INTERNAL MEDICINE

## 2024-06-16 PROCEDURE — 2500000001 HC RX 250 WO HCPCS SELF ADMINISTERED DRUGS (ALT 637 FOR MEDICARE OP): Performed by: INTERNAL MEDICINE

## 2024-06-16 PROCEDURE — C9113 INJ PANTOPRAZOLE SODIUM, VIA: HCPCS | Performed by: INTERNAL MEDICINE

## 2024-06-16 PROCEDURE — 82947 ASSAY GLUCOSE BLOOD QUANT: CPT

## 2024-06-16 PROCEDURE — 2500000004 HC RX 250 GENERAL PHARMACY W/ HCPCS (ALT 636 FOR OP/ED): Performed by: INTERNAL MEDICINE

## 2024-06-16 PROCEDURE — 85027 COMPLETE CBC AUTOMATED: CPT | Performed by: INTERNAL MEDICINE

## 2024-06-16 RX ORDER — OLANZAPINE 10 MG/2ML
2.5 INJECTION, POWDER, FOR SOLUTION INTRAMUSCULAR EVERY 8 HOURS PRN
Status: DISCONTINUED | OUTPATIENT
Start: 2024-06-16 | End: 2024-06-18 | Stop reason: HOSPADM

## 2024-06-16 ASSESSMENT — ENCOUNTER SYMPTOMS
ENDOCRINE NEGATIVE: 1
HEMATOLOGIC/LYMPHATIC NEGATIVE: 1
CARDIOVASCULAR NEGATIVE: 1
ALLERGIC/IMMUNOLOGIC NEGATIVE: 1
MUSCULOSKELETAL NEGATIVE: 1
RESPIRATORY NEGATIVE: 1
PSYCHIATRIC NEGATIVE: 1
CONSTITUTIONAL NEGATIVE: 1
BLOOD IN STOOL: 1
NEUROLOGICAL NEGATIVE: 1
EYES NEGATIVE: 1

## 2024-06-16 ASSESSMENT — COGNITIVE AND FUNCTIONAL STATUS - GENERAL
WALKING IN HOSPITAL ROOM: A LITTLE
DRESSING REGULAR LOWER BODY CLOTHING: A LITTLE
HELP NEEDED FOR BATHING: A LITTLE
CLIMB 3 TO 5 STEPS WITH RAILING: A LITTLE
CLIMB 3 TO 5 STEPS WITH RAILING: A LITTLE
TOILETING: A LITTLE
WALKING IN HOSPITAL ROOM: A LITTLE
TOILETING: A LITTLE
MOVING TO AND FROM BED TO CHAIR: A LITTLE
MOBILITY SCORE: 20
MOBILITY SCORE: 20
HELP NEEDED FOR BATHING: A LITTLE
STANDING UP FROM CHAIR USING ARMS: A LITTLE
DAILY ACTIVITIY SCORE: 21
DRESSING REGULAR LOWER BODY CLOTHING: A LITTLE
STANDING UP FROM CHAIR USING ARMS: A LITTLE
DAILY ACTIVITIY SCORE: 21
MOVING TO AND FROM BED TO CHAIR: A LITTLE

## 2024-06-16 ASSESSMENT — PAIN - FUNCTIONAL ASSESSMENT
PAIN_FUNCTIONAL_ASSESSMENT: 0-10
PAIN_FUNCTIONAL_ASSESSMENT: 0-10

## 2024-06-16 ASSESSMENT — PAIN SCALES - GENERAL
PAINLEVEL_OUTOF10: 0 - NO PAIN
PAINLEVEL_OUTOF10: 0 - NO PAIN

## 2024-06-16 NOTE — H&P
History Of Present Illness  Herminio Devlin is a 84 y.o. male presenting with shortness of breath.  Patient has history of coronary artery disease, hypertension, hyperlipidemia, BPH presents to the emergency room with with shortness of breath.  Patient actually came in this morning with nausea and vomiting.  He said he vomited 3 times and that it looked a little red.  He came to the emergency room.  He denied any pain at that time.  He said he felt overall terrible because he did not sleep last night.  He had troponins drawn that went from 70-81.  He had no ACS symptoms.  He was discharged home when he was feeling better.  He went home and then he started feeling short of breath.  The ER doctor told him to come back if he felt short of breath at all or if anything was different or worse.  So he came back to the hospital.  Repeat troponin now is 91.  EKG shows diffuse ST depression that was not previous on prior EKG.  Case was discussed with cardiologist Dr. Salvador.  Patient was started on oral aspirin, IV heparin drip, nitroglycerin ointment.  Dr. Salvador recommended transfer to [facility] for possible heart catheterization.  Patient was admitted initially to [facility] waiting for bed at [facility].  Patient complained of feeling shaky.  His son notes that he was lightheaded earlier with walking.  Patient reports that he has black stool sometimes but says he has not had a bowel movement in a few days.  He reports taking Aleve daily.  He denies any history of gastric ulcers.    Initially, patient was started heparin drip but then he had significant drop in the level of H&H.  Heparin drip was discontinued and anticoagulation was reversed with protamine.  Started on IV Protonix  Seen by cardiologist, Dr. Flores:   #1 shortness of breath secondary to severe anemia which in turn is secondary to upper GI bleeding based on the fact that patient was having black stools prior to this hospitalization.   Patient did received IV Protonix 80 mg earlier today.  Will be receiving blood transfusion 1 unit in the next few hours.  Will supplement oxygen 2 L via nasal cannula.  2.  Weighted high-sensitivity-troponins indicating type II myocardial infarction secondary to severe anemia.  Patient has history of coronary artery bypass surgery and usually follows with Dr. Yeboah for his cardiac care.  Patient initiated on IV heparin for elevated troponins and that is been discontinued and patient did receive protamine to reverse effect of the IV heparin and the plan would be to continue closely monitoring his hematocrit and but once a bed is available he will be transferred to [facility] for more close monitoring and possible need for urgent EGD should in case his hemoglobin continues to drop.  Will hold off all antiplatelet agents at this time and will continue patient on Nitropaste as blood pressure tolerates.  Past Medical History  He has a past medical history of Atherosclerotic heart disease of native coronary artery without angina pectoris (10/25/2022), Disorder of prostate, unspecified, Essential (primary) hypertension (12/02/2020), Hyperlipidemia, unspecified (10/25/2022), Occlusion and stenosis of unspecified carotid artery (10/25/2022), and Personal history of other diseases of urinary system.       Surgical History  He has a past surgical history that includes Other surgical history (02/24/2014); Coronary artery bypass graft (11/09/2015); and Other surgical history (02/01/2016).     Social History  He reports that he quit smoking about 30 years ago. His smoking use included cigarettes. He started smoking about 71 years ago. He has a 41 pack-year smoking history. He has been exposed to tobacco smoke. He has never used smokeless tobacco. He reports that he does not drink alcohol and does not use drugs.    Family History  Family History   Problem Relation Name Age of Onset    Coronary artery disease  Brother          Allergies  Patient has no known allergies.    Review of Systems  General: no fatigue, no malaise, no fevers/chills   HENT: no rhinorrhea, no sore throat, no ear pain   Eyes: no change in vision, denies eye pain or discharge   Lungs: + SOB, no cough, no hemoptysis   CV: no chest pain, no palpitations, no leg edema   Abd: + nausea/vomiting, no constipation/diarrhea, no abdominal pain   : no dysuria, no frequency, no nocturia, no flank pain   Endocrine: no polydipsia/polyuria, no hot or cold intolerance   Neuro: no headaches, no syncope, no seizures   MSK: no back pain, no neck pain, no joint problems   Psych: no anxiety, no depression, no hallucinations       Physical Exam  General: alert, no diaphoresis oriented to name and place, somewhat somnolent   HENT: mucous membranes moist, external ears normal, no rhinorrhea   Eyes: no icterus or injection, no discharge   Lungs: CTA BL   Heart: Regular and tachycardic, no LE edema BL   GI: abdomen soft, nontender, nondistended, BS present   MSK: no joint effusion or deformity   Skin: no rashes, erythema, or ecchymosis   Neuro: grossly normal cognition, motor strength, sensation.  Moves all extremities.  Face is symmetrical.  Psychiatric: No evidence of psychosis, suicidal ideation or agitation     Last Recorded Vitals  /60 (BP Location: Right arm, Patient Position: Lying)   Pulse 94   Temp 36.8 °C (98.2 °F) (Temporal)   Resp 18   Wt 81.7 kg (180 lb 3.2 oz)   SpO2 100%     Relevant Results      Results for orders placed or performed during the hospital encounter of 06/15/24 (from the past 24 hour(s))   CBC and Auto Differential   Result Value Ref Range    WBC 12.0 (H) 4.4 - 11.3 x10*3/uL    nRBC 0.0 0.0 - 0.0 /100 WBCs    RBC 2.70 (L) 4.50 - 5.90 x10*6/uL    Hemoglobin 8.8 (L) 13.5 - 17.5 g/dL    Hematocrit 26.9 (L) 41.0 - 52.0 %     80 - 100 fL    MCH 32.6 26.0 - 34.0 pg    MCHC 32.7 32.0 - 36.0 g/dL    RDW 15.2 (H) 11.5 - 14.5 %     Platelets 267 150 - 450 x10*3/uL    Neutrophils % 82.9 40.0 - 80.0 %    Immature Granulocytes %, Automated 0.4 0.0 - 0.9 %    Lymphocytes % 10.1 13.0 - 44.0 %    Monocytes % 6.3 2.0 - 10.0 %    Eosinophils % 0.1 0.0 - 6.0 %    Basophils % 0.2 0.0 - 2.0 %    Neutrophils Absolute 9.95 (H) 1.60 - 5.50 x10*3/uL    Immature Granulocytes Absolute, Automated 0.05 0.00 - 0.50 x10*3/uL    Lymphocytes Absolute 1.22 0.80 - 3.00 x10*3/uL    Monocytes Absolute 0.76 0.05 - 0.80 x10*3/uL    Eosinophils Absolute 0.01 0.00 - 0.40 x10*3/uL    Basophils Absolute 0.03 0.00 - 0.10 x10*3/uL   Basic metabolic panel   Result Value Ref Range    Glucose 158 (H) 65 - 99 mg/dL    Sodium 141 133 - 145 mmol/L    Potassium 4.7 3.4 - 5.1 mmol/L    Chloride 107 97 - 107 mmol/L    Bicarbonate 23 (L) 24 - 31 mmol/L    Urea Nitrogen 70 (H) 8 - 25 mg/dL    Creatinine 1.10 0.40 - 1.60 mg/dL    eGFR 67 >60 mL/min/1.73m*2    Calcium 9.1 8.5 - 10.4 mg/dL    Anion Gap 11 <=19 mmol/L   Serial Troponin, Initial ([facility])   Result Value Ref Range    Troponin T, High Sensitivity 94 (HH) <=14 ng/L   Platelet count - baseline   Result Value Ref Range    Platelets 244 150 - 450 x10*3/uL   D-dimer, quantitative   Result Value Ref Range    D-Dimer Non VTE, Quant (mg/L FEU) 0.84 (H) 0.19 - 0.50 mg/L FEU   aPTT   Result Value Ref Range    aPTT >139.0 (HH) 22.0 - 32.5 seconds   Occult Blood, Stool    Specimen: Stool   Result Value Ref Range    Occult Blood, Stool X1 Positive (A) Negative   CBC   Result Value Ref Range    WBC 11.3 4.4 - 11.3 x10*3/uL    nRBC 0.0 0.0 - 0.0 /100 WBCs    RBC 2.32 (L) 4.50 - 5.90 x10*6/uL    Hemoglobin 7.4 (L) 13.5 - 17.5 g/dL    Hematocrit 23.3 (L) 41.0 - 52.0 %     80 - 100 fL    MCH 31.9 26.0 - 34.0 pg    MCHC 31.8 (L) 32.0 - 36.0 g/dL    RDW 15.4 (H) 11.5 - 14.5 %    Platelets 236 150 - 450 x10*3/uL   Type and screen   Result Value Ref Range    ABO TYPE O     Rh TYPE POS     ANTIBODY SCREEN NEG    VERIFY ABO/Rh Group Test   Result  Value Ref Range    ABO TYPE O     Rh TYPE POS    Prepare RBC: 1 Units   Result Value Ref Range    PRODUCT CODE Y6645R93     Unit Number [id number]     Unit ABO O     Unit RH NEG     XM INTEP COMP     Dispense Status IS     Blood Expiration Date June 18, 2024 23:59 EDT     PRODUCT BLOOD TYPE 9500     UNIT VOLUME 350    aPTT   Result Value Ref Range    aPTT 64.2 (H) 22.0 - 32.5 seconds   Protime-INR   Result Value Ref Range    Protime 11.2 9.3 - 12.7 seconds    INR 1.1 0.9 - 1.2   Serial Troponin, 6 Hour ([facility])   Result Value Ref Range    Troponin T, High Sensitivity 82 (HH) <=14 ng/L    XR chest 1 view    Result Date: 6/15/2024  Interpreted By:  Ozzie Romano, STUDY: XR CHEST 1 VIEW;  6/15/2024 3:58 pm   INDICATION: Signs/Symptoms:SOB, CP.   COMPARISON: None.   ACCESSION NUMBER(S): [id number]   ORDERING CLINICIAN: TANJA RICKETTS   FINDINGS: Mediastinal wires present.   CARDIOMEDIASTINAL SILHOUETTE: Cardiomediastinal silhouette is normal in size and configuration.   LUNGS: Lungs are clear. There is mild hyperinflation. There is no consolidation or edema   ABDOMEN: No remarkable upper abdominal findings.   BONES: No acute osseous changes.       1.  No evidence of acute cardiopulmonary process.       MACRO: None   Signed by: Ozzie Romano 6/15/2024 4:10 PM Dictation workstation:   [id number]    EKG, reviewed: Sinus rhythm with ST depression in precordial leads.  Demonstrates sinus rhythm with ST depression     Assessment/Plan   Active Problems:  There are no active Hospital Problems.    NSTEMI  - ST depression diffuse on EKG. with slowly elevated troponins.  Likely type II non-STEMI due to severe anemia    Suspected upper GI bleed  Acute posthemorrhagic anemia  - hemoccult positive Dr. Ricketts reports dark stool on rectal exam  - patient takes Aleve daily in addition to baby aspirin and plavix. BUN markedly high, and higher than it was last night  - repeat H/H stat with type and screen  - start PPI  IV BID, 80 mg IV x1 as first dose  No evidence of acute bleeding    HTN  - hold antihypertensives    BPH  - flomax    Chronic pain  - gabapentin    Gout  - allopurinol    FULL CODE-- discussed with patient.    Patient high risk of deterioration. Monitor closely in SDU.     Plan: Monitor in stepdown, check H&H  Continue consults to cardiology and GI  IV Protonix         Yarelis Yanez MD

## 2024-06-16 NOTE — CONSULTS
Inpatient consult to Gastroenterology  Consult performed by: Sujatha Owusu, CHRISTOFER-CNP  Consult ordered by: Yarelis Yanez MD          Reason For Consult  GI Bleed    History Of Present Illness  Herminio Devlin is a 84 y.o. male presenting with shortness of breath to . Patient has history of coronary artery disease on Plavix/ASA,  hypertension, hyperlipidemia, BPH. On arrival EKG shows diffuse ST depression. He was transferred from  to  for NSTEMI. It was noted he had nausea and vomiting prior to coming in hospital and looked a little red. Patient reports that he has has intermittent black stool. He reports taking Aleve daily. He denies any history of gastric ulcers. He was seen by cardiology, patient was started on IV heparin for elevated troponin's which has been discontinued and patient did receive protamine to reverse effect of the IV heparin. Cardiology noted to hold off all antiplatelet agents due to his GI bleeding causing the changes in EKG as well as his shortness of breath and elevated troponin. He had a colonoscopy/EGD by Dr. Tate in 10/16 which showed diverticulosis and a polyp. Patient has not had any more episodes of vomiting today. No BM reported this am. He denies any current abdominal pain.     Past Medical History  He has a past medical history of Atherosclerotic heart disease of native coronary artery without angina pectoris (10/25/2022), Disorder of prostate, unspecified, Essential (primary) hypertension (12/02/2020), Hyperlipidemia, unspecified (10/25/2022), Occlusion and stenosis of unspecified carotid artery (10/25/2022), and Personal history of other diseases of urinary system.    Surgical History  He has a past surgical history that includes Other surgical history (02/24/2014); Coronary artery bypass graft (11/09/2015); and Other surgical history (02/01/2016).     Social History  He reports that he quit smoking about 30 years ago. His smoking use included cigarettes. He started  "smoking about 71 years ago. He has a 41 pack-year smoking history. He has been exposed to tobacco smoke. He has never used smokeless tobacco. He reports that he does not drink alcohol and does not use drugs.    Family History  Family History   Problem Relation Name Age of Onset    Coronary artery disease Brother          Allergies  Patient has no known allergies.    Review of Systems   Constitutional: Negative.    HENT: Negative.     Eyes: Negative.    Respiratory: Negative.     Cardiovascular: Negative.    Gastrointestinal:  Positive for blood in stool.   Endocrine: Negative.    Genitourinary: Negative.    Musculoskeletal: Negative.    Skin: Negative.    Allergic/Immunologic: Negative.    Neurological: Negative.    Hematological: Negative.    Psychiatric/Behavioral: Negative.          Physical Exam  HENT:      Head: Normocephalic.      Nose: Nose normal.      Mouth/Throat:      Mouth: Mucous membranes are moist.   Eyes:      Pupils: Pupils are equal, round, and reactive to light.   Cardiovascular:      Rate and Rhythm: Normal rate.   Abdominal:      Palpations: Abdomen is soft.   Musculoskeletal:         General: Normal range of motion.      Cervical back: Normal range of motion.   Skin:     General: Skin is warm.   Neurological:      General: No focal deficit present.      Mental Status: He is alert.   Psychiatric:         Mood and Affect: Mood normal.          Last Recorded Vitals  Blood pressure 161/74, pulse 97, temperature 36.3 °C (97.3 °F), temperature source Temporal, resp. rate 13, height 1.575 m (5' 2\"), weight 78.3 kg (172 lb 9.6 oz), SpO2 100%.    Relevant Results  XR chest 1 view    Result Date: 6/15/2024  Interpreted By:  Ozzie Romano, STUDY: XR CHEST 1 VIEW;  6/15/2024 3:58 pm   INDICATION: Signs/Symptoms:SOB, CP.   COMPARISON: None.   ACCESSION NUMBER(S): [id number]   ORDERING CLINICIAN: TANJA RICKETTS   FINDINGS: Mediastinal wires present.   CARDIOMEDIASTINAL SILHOUETTE: " Cardiomediastinal silhouette is normal in size and configuration.   LUNGS: Lungs are clear. There is mild hyperinflation. There is no consolidation or edema   ABDOMEN: No remarkable upper abdominal findings.   BONES: No acute osseous changes.       1.  No evidence of acute cardiopulmonary process.       MACRO: None   Signed by: Ozzie Romano 6/15/2024 4:10 PM Dictation workstation:   [id number]      Scheduled medications  allopurinol, 300 mg, oral, Daily  gabapentin, 600 mg, oral, TID  pantoprazole, 40 mg, intravenous, BID AC      Continuous medications     PRN medications  PRN medications: acetaminophen, OLANZapine, prochlorperazine  Results for orders placed or performed during the hospital encounter of 06/15/24 (from the past 24 hour(s))   CBC   Result Value Ref Range    WBC 10.3 4.4 - 11.3 x10*3/uL    nRBC 0.0 0.0 - 0.0 /100 WBCs    RBC 2.41 (L) 4.50 - 5.90 x10*6/uL    Hemoglobin 7.7 (L) 13.5 - 17.5 g/dL    Hematocrit 23.7 (L) 41.0 - 52.0 %    MCV 98 80 - 100 fL    MCH 32.0 26.0 - 34.0 pg    MCHC 32.5 32.0 - 36.0 g/dL    RDW 17.4 (H) 11.5 - 14.5 %    Platelets 202 150 - 450 x10*3/uL   Hemoglobin and hematocrit, blood   Result Value Ref Range    Hemoglobin 7.7 (L) 13.5 - 17.5 g/dL    Hematocrit 23.7 (L) 41.0 - 52.0 %   CBC   Result Value Ref Range    WBC 9.4 4.4 - 11.3 x10*3/uL    nRBC 0.0 0.0 - 0.0 /100 WBCs    RBC 2.34 (L) 4.50 - 5.90 x10*6/uL    Hemoglobin 7.3 (L) 13.5 - 17.5 g/dL    Hematocrit 22.4 (L) 41.0 - 52.0 %    MCV 96 80 - 100 fL    MCH 31.2 26.0 - 34.0 pg    MCHC 32.6 32.0 - 36.0 g/dL    RDW 18.5 (H) 11.5 - 14.5 %    Platelets 199 150 - 450 x10*3/uL   Comprehensive Metabolic Panel   Result Value Ref Range    Glucose 130 (H) 65 - 99 mg/dL    Sodium 144 133 - 145 mmol/L    Potassium 4.0 3.4 - 5.1 mmol/L    Chloride 111 (H) 97 - 107 mmol/L    Bicarbonate 23 (L) 24 - 31 mmol/L    Urea Nitrogen 77 (H) 8 - 25 mg/dL    Creatinine 1.20 0.40 - 1.60 mg/dL    eGFR 60 (L) >60 mL/min/1.73m*2    Calcium 8.7  8.5 - 10.4 mg/dL    Albumin 3.4 (L) 3.5 - 5.0 g/dL    Alkaline Phosphatase 35 35 - 125 U/L    Total Protein 5.1 (L) 5.9 - 7.9 g/dL    AST 16 5 - 40 U/L    Bilirubin, Total 0.8 0.1 - 1.2 mg/dL    ALT 8 5 - 40 U/L    Anion Gap 10 <=19 mmol/L   POCT GLUCOSE   Result Value Ref Range    POCT Glucose 106 (H) 74 - 99 mg/dL        Assessment/Plan   Anemia/Melena/Anticoagulation   Normocytic anemia Hgb 7.7  Suspected upper GI bleed   -Reported hematemesis PTA, intermittent black stool   - patient takes Aleve daily in addition to baby aspirin and Plavix. BUN markedly high  - hemoccult positive per ER doctor   - PPI bid    -Monitor H/H closely   Will tentatively plan for EGD tomorrow r/o possible UGIB  NPO after midnight   monitor H/H closely       Sujatha Owusu, APRN-CNP

## 2024-06-16 NOTE — NURSING NOTE
Report called to JULIANA Zuluaga at [facility], pt to be transferred to [address] bed [address].  Family aware of transfer, awaiting transport.

## 2024-06-16 NOTE — PROGRESS NOTES
Herminio Devlin is a 84 y.o. male on day 1 of admission presenting with No Principal Problem: There is no principal problem currently on the Problem List. Please update the Problem List and refresh..      Subjective   Patient was agitated.  When I evaluated the patient he was sleepy, easy to arouse  He denied chest pain or shortness of breath patient denied have any abdominal pain.  Patient was transferred from [facility] .  There was concerning for GI bleed       Objective     Last Recorded Vitals  /74 (BP Location: Left arm, Patient Position: Lying)   Pulse 97   Temp 36.3 °C (97.3 °F) (Temporal)   Resp 13   Wt 78.3 kg (172 lb 9.6 oz)   SpO2 100%   Intake/Output last 3 Shifts:    Intake/Output Summary (Last 24 hours) at 6/16/2024 0846  Last data filed at 6/16/2024 0755  Gross per 24 hour   Intake --   Output 950 ml   Net -950 ml       Admission Weight  Weight: 81.7 kg (180 lb 3.2 oz) (06/15/24 2217)    Daily Weight  06/16/24 : 78.3 kg (172 lb 9.6 oz)    Image Results  XR chest 1 view  Narrative: Interpreted By:  Ozzie Romano,   STUDY:  XR CHEST 1 VIEW;  6/15/2024 3:58 pm      INDICATION:  Signs/Symptoms:SOB, CP.      COMPARISON:  None.      ACCESSION NUMBER(S):  [id number]      ORDERING CLINICIAN:  TANJA RICKETTS      FINDINGS:  Mediastinal wires present.      CARDIOMEDIASTINAL SILHOUETTE:  Cardiomediastinal silhouette is normal in size and configuration.      LUNGS:  Lungs are clear. There is mild hyperinflation. There is no  consolidation or edema      ABDOMEN:  No remarkable upper abdominal findings.      BONES:  No acute osseous changes.      Impression: 1.  No evidence of acute cardiopulmonary process.              MACRO:  None      Signed by: Ozzie Romano 6/15/2024 4:10 PM  Dictation workstation:   [id number]      Physical Exam    General:  cooperating during physical exam  HEENT: Pupils are equal and reactive to light and commendation , oral mucosa moist, no JVD , atrophic  scar on right side of the neck    cardiovascular: Normal sinus rhythm, no MRG.  Lungs: Clear to auscultation bilaterally, no wheezing, no crackles, no dullness to percussion.  Abdomen: No hepatosplenomegaly appreciated, soft , not tender, positive bowel sounds, positive bowel movement.  Neuro: Alert and oriented x 2, strength in upper and lower extremities , sensation intact.  Psych: Patient had great insight was going on  Musculoskeletal: No swelling in lower extremities, no limitation in range of motion.  Vascular: Pulses are intact in upper and lower extremities  Skin: No petechiae, ecchymosis or other stigmata for dermatology disease.        Assessment/Plan        NSTEMI  On EKG patient has diffuse ST-T changes, ST depression  Slight elevation of troponin  Most likely type II NSTEMI secondary to an anemia  Dr. Salvador on the case    Gastrointestinal bleed  Patient was Hemoccult positive  Patient has been on NSAIDs and addition of aspirin and Plavix.  Continue with PPI  No overt GI bleed   Waiting for GI to see him today.    Hypertension  Hold blood pressure medication today    Benign prostatic hypertrophy    Chronic pain   continue with gabapentin     Gout  On allopurinol    CBC and BMP in a.m.  Monitor close  Waiting for cardiology and GI to evaluate him.    Time spent with patient 35 minutes.        Active Problems:  There are no active Hospital Problems.                  Siddhartha Noel MD

## 2024-06-16 NOTE — CARE PLAN
The patient's goals for the shift include feel better    The clinical goals for the shift include VSS

## 2024-06-16 NOTE — PROGRESS NOTES
Subjective Data:  Denies any other chest discomfort shortness of breath.  His wife and son were at the bedside    Overnight Events:    Did had episode of confusion last night for which she was placed on restraints temporarily     Objective Data:  Last Recorded Vitals:  Vitals:    06/16/24 0500 06/16/24 0600 06/16/24 0755 06/16/24 0846   BP: 110/57 128/63 161/74    BP Location:   Left arm    Patient Position:   Lying    Pulse:   97    Resp:   13    Temp:   36.3 °C (97.3 °F)    TempSrc:   Temporal    SpO2: 95% 95% 100%    Weight:    78.3 kg (172 lb 9.6 oz)   Height:           Last Labs:  CBC - 6/16/2024: 11:43 AM  9.4 7.7 199    23.1      CMP - 6/16/2024:  5:21 AM  8.7 5.1 16 --- 0.8   _ 3.4 8 35      PTT - 6/15/2024:  6:14 PM  1.1   11.2 64.2     HGBA1C   Date/Time Value Ref Range Status   05/13/2024 08:15 AM 5.7 See below % Final   09/25/2023 08:08 AM 5.7 4.0 - 6.0 % Final     Comment:     Hemoglobin A1C levels are related to mean blood glucose during the   preceding 2-3 months. The relationship table below may be used as a   general guide. Each 1% increase in HGB A1C is a reflection of an   increase in mean glucose of approximately 30 mg/dl.   Reference: Diabetes Care, volume 29, supplement 1 Jan. 2006                        HGB A1C ................. Approx. Mean Glucose   _______________________________________________   6%   ...............................  120 mg/dl   7%   ...............................  150 mg/dl   8%   ...............................  180 mg/dl   9%   ...............................  210 mg/dl   10%  ...............................  240 mg/dl  Performed at [address] [address]     03/20/2023 08:45 AM 5.9 4.0 - 6.0 % Final     Comment:     Hemoglobin A1C levels are related to mean blood glucose during the   preceding 2-3 months. The relationship table below may be used as a   general guide. Each 1% increase in HGB A1C is a reflection of an   increase in mean glucose  of approximately 30 mg/dl.   Reference: Diabetes Care, volume 29, supplement 1 Jan. 2006                        HGB A1C ................. Approx. Mean Glucose   _______________________________________________   6%   ...............................  120 mg/dl   7%   ...............................  150 mg/dl   8%   ...............................  180 mg/dl   9%   ...............................  210 mg/dl   10%  ...............................  240 mg/dl  Performed at [address] [address]     07/27/2020 11:42 AM 5.9 4.3 - 5.6 % Final     Comment:     American Diabetes Association guidelines indicate that patients with HgbA1c in   the range 5.7-6.4% are at increased risk for development of diabetes, and   intervention by lifestyle modification may be beneficial. HgbA1c greater or   equal to 6.5% is considered diagnostic of diabetes.     LDLCALC   Date/Time Value Ref Range Status   05/13/2024 08:15 AM 18 65 - 130 mg/dL Final   09/25/2023 08:08 AM 4 65 - 130 MG/DL Final   03/20/2023 08:45 AM 9 65 - 130 MG/DL Final   09/15/2022 08:43 AM 10 65 - 130 MG/DL Final     VLDL   Date/Time Value Ref Range Status   11/04/2020 08:20 AM 37 0 - 40 mg/dL Final   08/04/2020 08:15 AM 46 0 - 40 mg/dL Final   05/28/2020 08:13 AM 35 0 - 40 mg/dL Final      Last I/O:  I/O last 3 completed shifts:  In: - (0 mL/kg)   Out: 550 (6.7 mL/kg) [Urine:550 (0.2 mL/kg/hr)]  Weight: 81.7 kg     Past Cardiology Tests (Last 3 Years):  EKG:  No results found for this or any previous visit from the past 1095 days.    Echo:  Transthoracic Echo (TTE) Complete 03/11/2024    Ejection Fractions:  EF   Date/Time Value Ref Range Status   03/11/2024 01:41 PM 66 %      Cath:  No results found for this or any previous visit from the past 1095 days.    Stress Test:  No results found for this or any previous visit from the past 1095 days.    Cardiac Imaging:  No results found for this or any previous visit from the past 1095  days.      Inpatient Medications:  Scheduled medications   Medication Dose Route Frequency    allopurinol  300 mg oral Daily    gabapentin  600 mg oral TID    pantoprazole  40 mg intravenous BID AC     PRN medications   Medication    acetaminophen    OLANZapine    prochlorperazine     Continuous Medications   Medication Dose Last Rate       Physical Exam:  HEENT PERRLA neck is supple lungs clear to auscultation bilaterally with good air entry heart S1-S2 present with systolic murmur best in the aortic area secondary to underlying aortic stenosis abdomen soft and nontender EXTR shows no evidence for pedal edema     Assessment/Plan   1 anemia workup is currently in progress.  The plan is to consider EGD tomorrow as patient was having black stools prior to hospitalization.  2.  Elevated high sensitivity troponins I am not convinced that patient did had a small NSTEMI given his flat trend of the troponins.  And also has some mildly elevated serum creatinine the BUN could be related secondary to increased absorption of the blood from upper GI bleeding  Cardiac viewpoint I see no indication to pursue any workup for ischemic heart at this time though patient was noted to have coronary artery status post coronary bypass surgery and he has no symptoms of chest pain or any anginal equivalent.  His symptoms of shortness of breath that he present is pre dominantly related to underlying anemia for which reason he was given blood transfusion.  Dr. Salvador will follow the patient from tomorrow  Site Assessment Clean;Dry;Intact 06/16/24 0900   Dressing Status Clean;Dry 06/16/24 0900   Number of days: 1       Peripheral IV 06/15/24 20 G Left;Posterior Forearm (Active)   Site Assessment Clean;Dry;Intact 06/16/24 0900   Dressing Status Clean;Dry 06/16/24 0900   Number of days: 1       Code Status:  Full Code    I spent 22 minutes in the professional and overall care of this patient.        Tomas Flores MD

## 2024-06-17 ENCOUNTER — ANESTHESIA (OUTPATIENT)
Dept: GASTROENTEROLOGY | Facility: HOSPITAL | Age: 84
DRG: 811 | End: 2024-06-17
Payer: MEDICARE

## 2024-06-17 ENCOUNTER — APPOINTMENT (OUTPATIENT)
Dept: GASTROENTEROLOGY | Facility: HOSPITAL | Age: 84
DRG: 811 | End: 2024-06-17
Payer: MEDICARE

## 2024-06-17 ENCOUNTER — APPOINTMENT (OUTPATIENT)
Dept: CARDIOLOGY | Facility: HOSPITAL | Age: 84
DRG: 811 | End: 2024-06-17
Payer: MEDICARE

## 2024-06-17 ENCOUNTER — ANESTHESIA EVENT (OUTPATIENT)
Dept: GASTROENTEROLOGY | Facility: HOSPITAL | Age: 84
DRG: 811 | End: 2024-06-17
Payer: MEDICARE

## 2024-06-17 PROBLEM — I21.A1 TYPE 2 ACUTE MYOCARDIAL INFARCTION (MULTI): Status: ACTIVE | Noted: 2024-06-15

## 2024-06-17 LAB
ABO GROUP (TYPE) IN BLOOD: NORMAL
ABO GROUP (TYPE) IN BLOOD: NORMAL
ALBUMIN SERPL-MCNC: 3.5 G/DL (ref 3.5–5)
ALP BLD-CCNC: 38 U/L (ref 35–125)
ALT SERPL-CCNC: 11 U/L (ref 5–40)
ANION GAP SERPL CALC-SCNC: 9 MMOL/L
ANTIBODY SCREEN: NORMAL
AST SERPL-CCNC: 20 U/L (ref 5–40)
ATRIAL RATE: 89 BPM
BILIRUB SERPL-MCNC: 0.2 MG/DL (ref 0.1–1.2)
BLOOD EXPIRATION DATE: NORMAL
BUN SERPL-MCNC: 49 MG/DL (ref 8–25)
CALCIUM SERPL-MCNC: 8.8 MG/DL (ref 8.5–10.4)
CHLORIDE SERPL-SCNC: 112 MMOL/L (ref 97–107)
CO2 SERPL-SCNC: 25 MMOL/L (ref 24–31)
CREAT SERPL-MCNC: 1 MG/DL (ref 0.4–1.6)
DISPENSE STATUS: NORMAL
EGFRCR SERPLBLD CKD-EPI 2021: 75 ML/MIN/1.73M*2
ERYTHROCYTE [DISTWIDTH] IN BLOOD BY AUTOMATED COUNT: 18.6 % (ref 11.5–14.5)
GLUCOSE BLD MANUAL STRIP-MCNC: 101 MG/DL (ref 74–99)
GLUCOSE BLD MANUAL STRIP-MCNC: 118 MG/DL (ref 74–99)
GLUCOSE BLD MANUAL STRIP-MCNC: 91 MG/DL (ref 74–99)
GLUCOSE SERPL-MCNC: 113 MG/DL (ref 65–99)
HCT VFR BLD AUTO: 22.2 % (ref 41–52)
HCT VFR BLD AUTO: 22.2 % (ref 41–52)
HGB BLD-MCNC: 7.5 G/DL (ref 13.5–17.5)
HGB BLD-MCNC: 7.5 G/DL (ref 13.5–17.5)
IRON SATN MFR SERPL: 19 % (ref 12–50)
IRON SERPL-MCNC: 56 UG/DL (ref 45–160)
MCH RBC QN AUTO: 31.4 PG (ref 26–34)
MCHC RBC AUTO-ENTMCNC: 33.8 G/DL (ref 32–36)
MCV RBC AUTO: 93 FL (ref 80–100)
NRBC BLD-RTO: 0.2 /100 WBCS (ref 0–0)
P AXIS: 19 DEGREES
P OFFSET: 192 MS
P ONSET: 146 MS
PLATELET # BLD AUTO: 208 X10*3/UL (ref 150–450)
POTASSIUM SERPL-SCNC: 3.9 MMOL/L (ref 3.4–5.1)
PR INTERVAL: 162 MS
PRODUCT BLOOD TYPE: 5100
PRODUCT CODE: NORMAL
PROT SERPL-MCNC: 5.6 G/DL (ref 5.9–7.9)
Q ONSET: 227 MS
QRS COUNT: 15 BEATS
QRS DURATION: 72 MS
QT INTERVAL: 372 MS
QTC CALCULATION(BAZETT): 452 MS
QTC FREDERICIA: 424 MS
R AXIS: 35 DEGREES
RBC # BLD AUTO: 2.39 X10*6/UL (ref 4.5–5.9)
RH FACTOR (ANTIGEN D): NORMAL
RH FACTOR (ANTIGEN D): NORMAL
SODIUM SERPL-SCNC: 146 MMOL/L (ref 133–145)
T AXIS: 104 DEGREES
T OFFSET: 413 MS
TIBC SERPL-MCNC: 301 UG/DL (ref 228–428)
UIBC SERPL-MCNC: 245 UG/DL (ref 110–370)
UNIT ABO: NORMAL
UNIT NUMBER: NORMAL
UNIT RH: NORMAL
UNIT VOLUME: 350
VENTRICULAR RATE: 89 BPM
WBC # BLD AUTO: 11.1 X10*3/UL (ref 4.4–11.3)
XM INTEP: NORMAL

## 2024-06-17 PROCEDURE — 86901 BLOOD TYPING SEROLOGIC RH(D): CPT | Performed by: INTERNAL MEDICINE

## 2024-06-17 PROCEDURE — 82947 ASSAY GLUCOSE BLOOD QUANT: CPT | Mod: 91

## 2024-06-17 PROCEDURE — 80053 COMPREHEN METABOLIC PANEL: CPT | Performed by: INTERNAL MEDICINE

## 2024-06-17 PROCEDURE — 2500000001 HC RX 250 WO HCPCS SELF ADMINISTERED DRUGS (ALT 637 FOR MEDICARE OP): Performed by: INTERNAL MEDICINE

## 2024-06-17 PROCEDURE — C9113 INJ PANTOPRAZOLE SODIUM, VIA: HCPCS | Performed by: INTERNAL MEDICINE

## 2024-06-17 PROCEDURE — 43239 EGD BIOPSY SINGLE/MULTIPLE: CPT

## 2024-06-17 PROCEDURE — 2500000005 HC RX 250 GENERAL PHARMACY W/O HCPCS: Performed by: INTERNAL MEDICINE

## 2024-06-17 PROCEDURE — 86920 COMPATIBILITY TEST SPIN: CPT

## 2024-06-17 PROCEDURE — 85027 COMPLETE CBC AUTOMATED: CPT | Performed by: INTERNAL MEDICINE

## 2024-06-17 PROCEDURE — 2500000004 HC RX 250 GENERAL PHARMACY W/ HCPCS (ALT 636 FOR OP/ED): Performed by: INTERNAL MEDICINE

## 2024-06-17 PROCEDURE — P9016 RBC LEUKOCYTES REDUCED: HCPCS

## 2024-06-17 PROCEDURE — 36415 COLL VENOUS BLD VENIPUNCTURE: CPT | Performed by: INTERNAL MEDICINE

## 2024-06-17 PROCEDURE — 83540 ASSAY OF IRON: CPT | Performed by: INTERNAL MEDICINE

## 2024-06-17 PROCEDURE — 88305 TISSUE EXAM BY PATHOLOGIST: CPT | Mod: TC | Performed by: INTERNAL MEDICINE

## 2024-06-17 PROCEDURE — 99100 ANES PT EXTEME AGE<1 YR&>70: CPT | Performed by: STUDENT IN AN ORGANIZED HEALTH CARE EDUCATION/TRAINING PROGRAM

## 2024-06-17 PROCEDURE — 36430 TRANSFUSION BLD/BLD COMPNT: CPT

## 2024-06-17 PROCEDURE — A43235 PR ESOPHAGOGASTRODUODENOSCOPY TRANSORAL DIAGNOSTIC: Performed by: STUDENT IN AN ORGANIZED HEALTH CARE EDUCATION/TRAINING PROGRAM

## 2024-06-17 PROCEDURE — 2500000004 HC RX 250 GENERAL PHARMACY W/ HCPCS (ALT 636 FOR OP/ED): Performed by: ANESTHESIOLOGIST ASSISTANT

## 2024-06-17 PROCEDURE — 2060000001 HC INTERMEDIATE ICU ROOM DAILY

## 2024-06-17 PROCEDURE — A43235 PR ESOPHAGOGASTRODUODENOSCOPY TRANSORAL DIAGNOSTIC: Performed by: ANESTHESIOLOGIST ASSISTANT

## 2024-06-17 PROCEDURE — 99233 SBSQ HOSP IP/OBS HIGH 50: CPT | Performed by: INTERNAL MEDICINE

## 2024-06-17 PROCEDURE — 7100000002 HC RECOVERY ROOM TIME - EACH INCREMENTAL 1 MINUTE

## 2024-06-17 PROCEDURE — 0DB78ZX EXCISION OF STOMACH, PYLORUS, VIA NATURAL OR ARTIFICIAL OPENING ENDOSCOPIC, DIAGNOSTIC: ICD-10-PCS | Performed by: INTERNAL MEDICINE

## 2024-06-17 PROCEDURE — 3700000002 HC GENERAL ANESTHESIA TIME - EACH INCREMENTAL 1 MINUTE

## 2024-06-17 PROCEDURE — 3700000001 HC GENERAL ANESTHESIA TIME - INITIAL BASE CHARGE

## 2024-06-17 PROCEDURE — 7100000001 HC RECOVERY ROOM TIME - INITIAL BASE CHARGE

## 2024-06-17 RX ORDER — ONDANSETRON HYDROCHLORIDE 2 MG/ML
4 INJECTION, SOLUTION INTRAVENOUS ONCE AS NEEDED
Status: ACTIVE | OUTPATIENT
Start: 2024-06-17 | End: 2024-06-18

## 2024-06-17 RX ORDER — SODIUM CHLORIDE, SODIUM LACTATE, POTASSIUM CHLORIDE, CALCIUM CHLORIDE 600; 310; 30; 20 MG/100ML; MG/100ML; MG/100ML; MG/100ML
INJECTION, SOLUTION INTRAVENOUS CONTINUOUS PRN
Status: DISCONTINUED | OUTPATIENT
Start: 2024-06-17 | End: 2024-06-17

## 2024-06-17 RX ORDER — ALLOPURINOL 300 MG/1
300 TABLET ORAL DAILY
Status: DISCONTINUED | OUTPATIENT
Start: 2024-06-17 | End: 2024-06-18 | Stop reason: HOSPADM

## 2024-06-17 RX ORDER — GABAPENTIN 600 MG/1
600 TABLET ORAL 3 TIMES DAILY
Status: DISCONTINUED | OUTPATIENT
Start: 2024-06-17 | End: 2024-06-18 | Stop reason: HOSPADM

## 2024-06-17 RX ORDER — ALBUTEROL SULFATE 0.83 MG/ML
2.5 SOLUTION RESPIRATORY (INHALATION) ONCE
Status: DISCONTINUED | OUTPATIENT
Start: 2024-06-17 | End: 2024-06-18 | Stop reason: HOSPADM

## 2024-06-17 RX ORDER — ACETAMINOPHEN 325 MG/1
650 TABLET ORAL EVERY 4 HOURS PRN
Status: ACTIVE | OUTPATIENT
Start: 2024-06-17 | End: 2024-06-18

## 2024-06-17 RX ORDER — SODIUM CHLORIDE, SODIUM LACTATE, POTASSIUM CHLORIDE, CALCIUM CHLORIDE 600; 310; 30; 20 MG/100ML; MG/100ML; MG/100ML; MG/100ML
100 INJECTION, SOLUTION INTRAVENOUS ONCE
Status: DISCONTINUED | OUTPATIENT
Start: 2024-06-17 | End: 2024-06-18 | Stop reason: HOSPADM

## 2024-06-17 RX ORDER — PROPOFOL 10 MG/ML
INJECTION, EMULSION INTRAVENOUS AS NEEDED
Status: DISCONTINUED | OUTPATIENT
Start: 2024-06-17 | End: 2024-06-17

## 2024-06-17 RX ORDER — PANTOPRAZOLE SODIUM 40 MG/10ML
40 INJECTION, POWDER, LYOPHILIZED, FOR SOLUTION INTRAVENOUS 2 TIMES DAILY
Status: DISCONTINUED | OUTPATIENT
Start: 2024-06-17 | End: 2024-06-18 | Stop reason: HOSPADM

## 2024-06-17 RX ORDER — OXYCODONE HYDROCHLORIDE 5 MG/1
5 TABLET ORAL EVERY 4 HOURS PRN
Status: ACTIVE | OUTPATIENT
Start: 2024-06-17 | End: 2024-06-18

## 2024-06-17 RX ORDER — METOPROLOL TARTRATE 1 MG/ML
5 INJECTION, SOLUTION INTRAVENOUS EVERY 6 HOURS
Status: DISCONTINUED | OUTPATIENT
Start: 2024-06-17 | End: 2024-06-18 | Stop reason: HOSPADM

## 2024-06-17 RX ORDER — FENTANYL CITRATE 50 UG/ML
50 INJECTION, SOLUTION INTRAMUSCULAR; INTRAVENOUS EVERY 5 MIN PRN
Status: ACTIVE | OUTPATIENT
Start: 2024-06-17 | End: 2024-06-18

## 2024-06-17 RX ORDER — OXYCODONE HYDROCHLORIDE 5 MG/1
10 TABLET ORAL EVERY 4 HOURS PRN
Status: ACTIVE | OUTPATIENT
Start: 2024-06-17 | End: 2024-06-18

## 2024-06-17 RX ORDER — FENTANYL CITRATE 50 UG/ML
25 INJECTION, SOLUTION INTRAMUSCULAR; INTRAVENOUS EVERY 5 MIN PRN
Status: ACTIVE | OUTPATIENT
Start: 2024-06-17 | End: 2024-06-18

## 2024-06-17 SDOH — HEALTH STABILITY: PHYSICAL HEALTH: ON AVERAGE, HOW MANY DAYS PER WEEK DO YOU ENGAGE IN MODERATE TO STRENUOUS EXERCISE (LIKE A BRISK WALK)?: 0 DAYS

## 2024-06-17 SDOH — SOCIAL STABILITY: SOCIAL INSECURITY: WITHIN THE LAST YEAR, HAVE YOU BEEN HUMILIATED OR EMOTIONALLY ABUSED IN OTHER WAYS BY YOUR PARTNER OR EX-PARTNER?: NO

## 2024-06-17 SDOH — HEALTH STABILITY: MENTAL HEALTH: HOW OFTEN DO YOU HAVE 6 OR MORE DRINKS ON ONE OCCASION?: NEVER

## 2024-06-17 SDOH — HEALTH STABILITY: MENTAL HEALTH: HOW MANY STANDARD DRINKS CONTAINING ALCOHOL DO YOU HAVE ON A TYPICAL DAY?: PATIENT DOES NOT DRINK

## 2024-06-17 SDOH — SOCIAL STABILITY: SOCIAL INSECURITY: WITHIN THE LAST YEAR, HAVE YOU BEEN AFRAID OF YOUR PARTNER OR EX-PARTNER?: NO

## 2024-06-17 SDOH — ECONOMIC STABILITY: TRANSPORTATION INSECURITY
IN THE PAST 12 MONTHS, HAS THE LACK OF TRANSPORTATION KEPT YOU FROM MEDICAL APPOINTMENTS OR FROM GETTING MEDICATIONS?: NO

## 2024-06-17 SDOH — ECONOMIC STABILITY: HOUSING INSECURITY
IN THE LAST 12 MONTHS, WAS THERE A TIME WHEN YOU DID NOT HAVE A STEADY PLACE TO SLEEP OR SLEPT IN A SHELTER (INCLUDING NOW)?: NO

## 2024-06-17 SDOH — SOCIAL STABILITY: SOCIAL NETWORK: ARE YOU MARRIED, WIDOWED, DIVORCED, SEPARATED, NEVER MARRIED, OR LIVING WITH A PARTNER?: MARRIED

## 2024-06-17 SDOH — SOCIAL STABILITY: SOCIAL NETWORK: IN A TYPICAL WEEK, HOW MANY TIMES DO YOU TALK ON THE PHONE WITH FAMILY, FRIENDS, OR NEIGHBORS?: NEVER

## 2024-06-17 SDOH — ECONOMIC STABILITY: INCOME INSECURITY: IN THE LAST 12 MONTHS, WAS THERE A TIME WHEN YOU WERE NOT ABLE TO PAY THE MORTGAGE OR RENT ON TIME?: NO

## 2024-06-17 SDOH — SOCIAL STABILITY: SOCIAL NETWORK: HOW OFTEN DO YOU GET TOGETHER WITH FRIENDS OR RELATIVES?: ONCE A WEEK

## 2024-06-17 SDOH — SOCIAL STABILITY: SOCIAL NETWORK: HOW OFTEN DO YOU ATTENT MEETINGS OF THE CLUB OR ORGANIZATION YOU BELONG TO?: NEVER

## 2024-06-17 SDOH — HEALTH STABILITY: MENTAL HEALTH
HOW OFTEN DO YOU NEED TO HAVE SOMEONE HELP YOU WHEN YOU READ INSTRUCTIONS, PAMPHLETS, OR OTHER WRITTEN MATERIAL FROM YOUR DOCTOR OR PHARMACY?: SOMETIMES

## 2024-06-17 SDOH — ECONOMIC STABILITY: FOOD INSECURITY: WITHIN THE PAST 12 MONTHS, YOU WORRIED THAT YOUR FOOD WOULD RUN OUT BEFORE YOU GOT MONEY TO BUY MORE.: NEVER TRUE

## 2024-06-17 SDOH — ECONOMIC STABILITY: FOOD INSECURITY: WITHIN THE PAST 12 MONTHS, THE FOOD YOU BOUGHT JUST DIDN'T LAST AND YOU DIDN'T HAVE MONEY TO GET MORE.: NEVER TRUE

## 2024-06-17 SDOH — HEALTH STABILITY: MENTAL HEALTH: HOW OFTEN DO YOU HAVE A DRINK CONTAINING ALCOHOL?: NEVER

## 2024-06-17 SDOH — SOCIAL STABILITY: SOCIAL NETWORK: HOW OFTEN DO YOU ATTEND CHURCH OR RELIGIOUS SERVICES?: NEVER

## 2024-06-17 SDOH — ECONOMIC STABILITY: INCOME INSECURITY: HOW HARD IS IT FOR YOU TO PAY FOR THE VERY BASICS LIKE FOOD, HOUSING, MEDICAL CARE, AND HEATING?: NOT HARD AT ALL

## 2024-06-17 SDOH — ECONOMIC STABILITY: HOUSING INSECURITY: IN THE LAST 12 MONTHS, HOW MANY PLACES HAVE YOU LIVED?: 1

## 2024-06-17 SDOH — ECONOMIC STABILITY: INCOME INSECURITY: IN THE PAST 12 MONTHS, HAS THE ELECTRIC, GAS, OIL, OR WATER COMPANY THREATENED TO SHUT OFF SERVICE IN YOUR HOME?: NO

## 2024-06-17 SDOH — ECONOMIC STABILITY: TRANSPORTATION INSECURITY
IN THE PAST 12 MONTHS, HAS LACK OF TRANSPORTATION KEPT YOU FROM MEETINGS, WORK, OR FROM GETTING THINGS NEEDED FOR DAILY LIVING?: NO

## 2024-06-17 SDOH — HEALTH STABILITY: PHYSICAL HEALTH: ON AVERAGE, HOW MANY MINUTES DO YOU ENGAGE IN EXERCISE AT THIS LEVEL?: 0 MIN

## 2024-06-17 ASSESSMENT — COGNITIVE AND FUNCTIONAL STATUS - GENERAL
DRESSING REGULAR LOWER BODY CLOTHING: A LITTLE
CLIMB 3 TO 5 STEPS WITH RAILING: A LITTLE
TOILETING: A LITTLE
STANDING UP FROM CHAIR USING ARMS: A LITTLE
HELP NEEDED FOR BATHING: A LITTLE
MOVING TO AND FROM BED TO CHAIR: A LITTLE
DAILY ACTIVITIY SCORE: 21
DAILY ACTIVITIY SCORE: 21
DRESSING REGULAR LOWER BODY CLOTHING: A LITTLE
CLIMB 3 TO 5 STEPS WITH RAILING: A LITTLE
STANDING UP FROM CHAIR USING ARMS: A LITTLE
WALKING IN HOSPITAL ROOM: A LITTLE
WALKING IN HOSPITAL ROOM: A LITTLE
TOILETING: A LITTLE
MOBILITY SCORE: 20
MOVING TO AND FROM BED TO CHAIR: A LITTLE
MOBILITY SCORE: 20
HELP NEEDED FOR BATHING: A LITTLE

## 2024-06-17 ASSESSMENT — PAIN SCALES - GENERAL

## 2024-06-17 ASSESSMENT — PAIN - FUNCTIONAL ASSESSMENT
PAIN_FUNCTIONAL_ASSESSMENT: 0-10

## 2024-06-17 ASSESSMENT — LIFESTYLE VARIABLES
AUDIT-C TOTAL SCORE: 0
SKIP TO QUESTIONS 9-10: 1

## 2024-06-17 ASSESSMENT — ACTIVITIES OF DAILY LIVING (ADL): LACK_OF_TRANSPORTATION: NO

## 2024-06-17 NOTE — PROGRESS NOTES
Subjective Data:  Patient is doing okay.  Denies having chest pain.  Lying comfortable in bed.    Overnight Events:    Telemetry overnight reviewed no events     Objective Data:  Last Recorded Vitals:  Vitals:    06/17/24 0055 06/17/24 0459 06/17/24 0815 06/17/24 0831   BP: 124/54 135/59 168/58    BP Location: Left arm Left arm Left arm    Patient Position: Lying Lying Lying    Pulse: 85 89 106    Resp: 16 16 12    Temp: 37.2 °C (99 °F) 37.1 °C (98.8 °F) 36.2 °C (97.2 °F)    TempSrc: Temporal Temporal Temporal    SpO2: 95% 97% 98%    Weight:    78.3 kg (172 lb 9.9 oz)   Height:           Last Labs:  CBC - 6/17/2024:  4:57 AM;  4:57 AM  11.1 7.5; 7.5 208    22.2; 22.2      CMP - 6/17/2024:  4:57 AM  8.8 5.6 20 --- 0.2   _ 3.5 11 38      PTT - 6/15/2024:  6:14 PM  1.1   11.2 64.2     HGBA1C   Date/Time Value Ref Range Status   05/13/2024 08:15 AM 5.7 See below % Final   09/25/2023 08:08 AM 5.7 4.0 - 6.0 % Final     Comment:     Hemoglobin A1C levels are related to mean blood glucose during the   preceding 2-3 months. The relationship table below may be used as a   general guide. Each 1% increase in HGB A1C is a reflection of an   increase in mean glucose of approximately 30 mg/dl.   Reference: Diabetes Care, volume 29, supplement 1 Jan. 2006                        HGB A1C ................. Approx. Mean Glucose   _______________________________________________   6%   ...............................  120 mg/dl   7%   ...............................  150 mg/dl   8%   ...............................  180 mg/dl   9%   ...............................  210 mg/dl   10%  ...............................  240 mg/dl  Performed at [address] [address]     03/20/2023 08:45 AM 5.9 4.0 - 6.0 % Final     Comment:     Hemoglobin A1C levels are related to mean blood glucose during the   preceding 2-3 months. The relationship table below may be used as a   general guide. Each 1% increase in HGB A1C is a  reflection of an   increase in mean glucose of approximately 30 mg/dl.   Reference: Diabetes Care, volume 29, supplement 1 Jan. 2006                        HGB A1C ................. Approx. Mean Glucose   _______________________________________________   6%   ...............................  120 mg/dl   7%   ...............................  150 mg/dl   8%   ...............................  180 mg/dl   9%   ...............................  210 mg/dl   10%  ...............................  240 mg/dl  Performed at [address] [address]     07/27/2020 11:42 AM 5.9 4.3 - 5.6 % Final     Comment:     American Diabetes Association guidelines indicate that patients with HgbA1c in   the range 5.7-6.4% are at increased risk for development of diabetes, and   intervention by lifestyle modification may be beneficial. HgbA1c greater or   equal to 6.5% is considered diagnostic of diabetes.     LDLCALC   Date/Time Value Ref Range Status   05/13/2024 08:15 AM 18 65 - 130 mg/dL Final   09/25/2023 08:08 AM 4 65 - 130 MG/DL Final   03/20/2023 08:45 AM 9 65 - 130 MG/DL Final   09/15/2022 08:43 AM 10 65 - 130 MG/DL Final     VLDL   Date/Time Value Ref Range Status   11/04/2020 08:20 AM 37 0 - 40 mg/dL Final   08/04/2020 08:15 AM 46 0 - 40 mg/dL Final   05/28/2020 08:13 AM 35 0 - 40 mg/dL Final      Last I/O:  I/O last 3 completed shifts:  In: 240 (3.1 mL/kg) [P.O.:240]  Out: 950 (12.1 mL/kg) [Urine:950 (0.3 mL/kg/hr)]  Weight: 78.3 kg     Past Cardiology Tests (Last 3 Years):  EKG:  ECG 12 lead 06/15/2024 (Preliminary)    Echo:  Transthoracic Echo (TTE) Complete 03/11/2024    Ejection Fractions:  EF   Date/Time Value Ref Range Status   03/11/2024 01:41 PM 66 %      Cath:  No results found for this or any previous visit from the past 1095 days.    Stress Test:  No results found for this or any previous visit from the past 1095 days.    Cardiac Imaging:  No results found for this or any previous visit from the  past 1095 days.      Inpatient Medications:  Scheduled medications   Medication Dose Route Frequency    iron sucrose  200 mg intravenous Daily     PRN medications   Medication    acetaminophen    OLANZapine    prochlorperazine     Continuous Medications   Medication Dose Last Rate       Physical Exam:  General: Patient is in no acute distress.  HEENT: atraumatic normocephalic.  Neck: is supple jugular venous pressure within normal limits no thyromegaly.  Cardiovascular regular rate and rhythm normal heart sounds no murmurs rubs or gallops.  Lungs: clear to auscultation bilaterally.  Abdomen: is soft nontender.  Extremities warm to touch no edema.  Neurologic examination: patient is awake alert oriented to person, place, date/time.  Psychiatric examination: patient has good insight denies feeling suicidal and depressed.  Pulses 2+ intact bilaterally     Assessment/Plan   #1 acute non-ST elevation myocardial infarction in the setting of severe GI bleeding.  Patient scheduled for EGD today.  EKG did show significant ST depressions with mild elevated troponin.  Likely secondary demand ischemia.  Patient has history of coronary disease status post CABG.  For now we will wait for final recommendation after EGD.  In addition to that he will have 2D echo done today.  Not candidate for aspirin or clopidogrel given recent GI bleeding.  Will restart statin control blood pressure and heart rate.  Peripheral IV 06/15/24 20 G Distal;Right;Posterior Forearm (Active)   Site Assessment Clean;Dry 06/16/24 2019   Dressing Status Clean;Dry 06/16/24 2019   Number of days: 2       Peripheral IV 06/15/24 20 G Left;Posterior Forearm (Active)   Site Assessment Clean;Dry 06/16/24 2019   Dressing Status Clean;Dry 06/16/24 2019   Number of days: 2       Code Status:  Full Code        Rafa Salvador MD

## 2024-06-17 NOTE — PROGRESS NOTES
"Herminio Devlin is a 84 y.o. male on day 2 of admission presenting with Symptomatic anemia.    Subjective   Seen and examined patient awaiting upper esophageal gastroduodenoscopy.  Remains on Protonix has clinical evidence of type II myocardial infarction cardiology notes reviewed echocardiogram pending review       Objective     Physical Exam  Vitals and nursing note reviewed.   Constitutional:       Appearance: Normal appearance.   HENT:      Head: Normocephalic and atraumatic.      Mouth/Throat:      Mouth: Mucous membranes are dry.   Eyes:      Extraocular Movements: Extraocular movements intact.      Pupils: Pupils are equal, round, and reactive to light.   Cardiovascular:      Rate and Rhythm: Normal rate and regular rhythm.      Pulses: Normal pulses.      Heart sounds: Murmur heard.   Pulmonary:      Effort: Pulmonary effort is normal.      Breath sounds: Normal breath sounds.   Abdominal:      Palpations: Abdomen is soft.   Musculoskeletal:         General: Normal range of motion.      Cervical back: Normal range of motion and neck supple.   Skin:     General: Skin is warm.      Capillary Refill: Capillary refill takes less than 2 seconds.      Coloration: Skin is pale.   Neurological:      General: No focal deficit present.      Mental Status: He is alert and oriented to person, place, and time. Mental status is at baseline.   Psychiatric:         Mood and Affect: Mood normal.         Last Recorded Vitals  Blood pressure 168/58, pulse 106, temperature 36.2 °C (97.2 °F), temperature source Temporal, resp. rate 12, height 1.575 m (5' 2\"), weight 78.3 kg (172 lb 9.9 oz), SpO2 98%.  Intake/Output last 3 Shifts:  I/O last 3 completed shifts:  In: 240 (3.1 mL/kg) [P.O.:240]  Out: 950 (12.1 mL/kg) [Urine:950 (0.3 mL/kg/hr)]  Weight: 78.3 kg     Relevant Results              Results for orders placed or performed during the hospital encounter of 06/15/24 (from the past 24 hour(s))   Hemoglobin and hematocrit, blood "   Result Value Ref Range    Hemoglobin 7.7 (L) 13.5 - 17.5 g/dL    Hematocrit 23.1 (L) 41.0 - 52.0 %   POCT GLUCOSE   Result Value Ref Range    POCT Glucose 105 (H) 74 - 99 mg/dL   Hemoglobin and hematocrit, blood   Result Value Ref Range    Hemoglobin 7.2 (L) 13.5 - 17.5 g/dL    Hematocrit 22.1 (L) 41.0 - 52.0 %   CBC   Result Value Ref Range    WBC 11.1 4.4 - 11.3 x10*3/uL    nRBC 0.2 (H) 0.0 - 0.0 /100 WBCs    RBC 2.39 (L) 4.50 - 5.90 x10*6/uL    Hemoglobin 7.5 (L) 13.5 - 17.5 g/dL    Hematocrit 22.2 (L) 41.0 - 52.0 %    MCV 93 80 - 100 fL    MCH 31.4 26.0 - 34.0 pg    MCHC 33.8 32.0 - 36.0 g/dL    RDW 18.6 (H) 11.5 - 14.5 %    Platelets 208 150 - 450 x10*3/uL   Comprehensive Metabolic Panel   Result Value Ref Range    Glucose 113 (H) 65 - 99 mg/dL    Sodium 146 (H) 133 - 145 mmol/L    Potassium 3.9 3.4 - 5.1 mmol/L    Chloride 112 (H) 97 - 107 mmol/L    Bicarbonate 25 24 - 31 mmol/L    Urea Nitrogen 49 (H) 8 - 25 mg/dL    Creatinine 1.00 0.40 - 1.60 mg/dL    eGFR 75 >60 mL/min/1.73m*2    Calcium 8.8 8.5 - 10.4 mg/dL    Albumin 3.5 3.5 - 5.0 g/dL    Alkaline Phosphatase 38 35 - 125 U/L    Total Protein 5.6 (L) 5.9 - 7.9 g/dL    AST 20 5 - 40 U/L    Bilirubin, Total 0.2 0.1 - 1.2 mg/dL    ALT 11 5 - 40 U/L    Anion Gap 9 <=19 mmol/L   Hemoglobin and hematocrit, blood   Result Value Ref Range    Hemoglobin 7.5 (L) 13.5 - 17.5 g/dL    Hematocrit 22.2 (L) 41.0 - 52.0 %   POCT GLUCOSE   Result Value Ref Range    POCT Glucose 91 74 - 99 mg/dL     Scheduled medications     Continuous medications     PRN medications  PRN medications: acetaminophen, OLANZapine, prochlorperazine    No echocardiogram results found for the past 14 days                 Assessment/Plan   Principal Problem:    Symptomatic anemia  Active Problems:    Type 2 acute myocardial infarction (Multi)    Transfuse 1 unit of packed cells today to maintain hemoglobin greater than or equal to 8 with hematocrit greater than or equal to 24 given his type II  myocardial infarction patient will require an additional 24 to 48 hours to monitor his resultant hemoglobin hematocrit to perform PT OT and assess the patient's hemodynamic stability would anticipate reasonable likelihood this patient may require skilled facility to regain his normal functional status following his presentation with acute upper gastrointestinal hemorrhage and subsequent type II myocardial infarction       I spent 60 minutes in the professional and overall care of this patient.      Herminio Shepherd, DO

## 2024-06-17 NOTE — PROGRESS NOTES
06/17/24 1707   Physical Activity   On average, how many days per week do you engage in moderate to strenuous exercise (like a brisk walk)? 0 days   On average, how many minutes do you engage in exercise at this level? 0 min   Financial Resource Strain   How hard is it for you to pay for the very basics like food, housing, medical care, and heating? Not hard   Housing Stability   In the last 12 months, was there a time when you were not able to pay the mortgage or rent on time? N   In the last 12 months, how many places have you lived? 1   In the last 12 months, was there a time when you did not have a steady place to sleep or slept in a shelter (including now)? N   Transportation Needs   In the past 12 months, has lack of transportation kept you from medical appointments or from getting medications? no   In the past 12 months, has lack of transportation kept you from meetings, work, or from getting things needed for daily living? No   Food Insecurity   Within the past 12 months, you worried that your food would run out before you got the money to buy more. Never true   Within the past 12 months, the food you bought just didn't last and you didn't have money to get more. Never true   Stress   Do you feel stress - tense, restless, nervous, or anxious, or unable to sleep at night because your mind is troubled all the time - these days? Not at all   Social Connections   In a typical week, how many times do you talk on the phone with family, friends, or neighbors? Never   How often do you get together with friends or relatives? Once   How often do you attend Anabaptist or Restorationism services? Never   Do you belong to any clubs or organizations such as Anabaptist groups, unions, fraternal or athletic groups, or school groups? No   How often do you attend meetings of the clubs or organizations you belong to? Never   Are you , , , , never , or living with a partner?    Intimate  Partner Violence   Within the last year, have you been afraid of your partner or ex-partner? No   Within the last year, have you been humiliated or emotionally abused in other ways by your partner or ex-partner? No   Within the last year, have you been kicked, hit, slapped, or otherwise physically hurt by your partner or ex-partner? No   Within the last year, have you been raped or forced to have any kind of sexual activity by your partner or ex-partner? No   Alcohol Use   Q1: How often do you have a drink containing alcohol? Never   Q2: How many drinks containing alcohol do you have on a typical day when you are drinking? None   Q3: How often do you have six or more drinks on one occasion? Never   Utilities   In the past 12 months has the electric, gas, oil, or water company threatened to shut off services in your home? No   Health Literacy   How often do you need to have someone help you when you read instructions, pamphlets, or other written material from your doctor or pharmacy? Sometimes

## 2024-06-17 NOTE — PROGRESS NOTES
06/17/24 1710   [facility] Disability Status   Are you deaf or do you have serious difficulty hearing? N   Are you blind or do you have serious difficulty seeing, even when wearing glasses? N   Because of a physical, mental, or emotional condition, do you have serious difficulty concentrating, remembering, or making decisions? (5 years old or older) N   Do you have serious difficulty walking or climbing stairs? N   Do you have serious difficulty dressing or bathing? N   Because of a physical, mental, or emotional condition, do you have serious difficulty doing errands alone such as visiting the doctor? N

## 2024-06-17 NOTE — CARE PLAN
Pt has a POA and Living Will; pt said that his wife Eun is the POA--documents are not on file  ADOD: 2 days    Pt lives at home with her  in a 2 story home with a basement; 3 steps to climb to enter the home  Pt drives, he does not use a cane or walker. He does not have home 02, no cpap or bipap. He wears glasses, and bilat hearing aids. He still has trouble hearing even with the hearing aids.  His PCP is Dr. Donald Price and he uses [facility] on [address] in [place] and he can afford his meds.  He sometimes needs assistance with reading and understanding documents.  Pt is here for SOB and N/V also black stools. Pt had EGD this morning.   PT OT ordered    DISCHARGE PLAN: TBD--PT OT ORDERED--DO NOT DISCHARGE PATIENT BEFORE SPEAKING WITH CARE COORDINATION

## 2024-06-17 NOTE — PROGRESS NOTES
06/17/24 1707   Discharge Planning   Living Arrangements Spouse/significant other   Support Systems Spouse/significant other   Type of Residence Private residence   Number of Stairs to Enter Residence 3   Number of Stairs Within Residence 24  (2 story home with a basement)   Do you have animals or pets at home? No   Who is requesting discharge planning? Provider   Home or Post Acute Services Other (Comment)  (Unknown at this time if pt will have discharge needs)   Patient expects to be discharged to: TBD   Does the patient need discharge transport arranged? No   Financial Resource Strain   How hard is it for you to pay for the very basics like food, housing, medical care, and heating? Not hard   Housing Stability   In the last 12 months, was there a time when you were not able to pay the mortgage or rent on time? N   In the last 12 months, how many places have you lived? 1   In the last 12 months, was there a time when you did not have a steady place to sleep or slept in a shelter (including now)? N   Transportation Needs   In the past 12 months, has lack of transportation kept you from medical appointments or from getting medications? no   In the past 12 months, has lack of transportation kept you from meetings, work, or from getting things needed for daily living? No   Patient Choice   Patient / Family choosing to utilize agency / facility established prior to hospitalization No

## 2024-06-17 NOTE — ANESTHESIA PREPROCEDURE EVALUATION
Patient: Herminio Devlin    Procedure Information       Anesthesia Start Date/Time: 06/17/24 1134    Scheduled providers: CHILANGO Cheung; Emily Armendariz MD    Procedure: EGD    Location: [facility]            Relevant Problems   Cardiac   (+) Angor pectoris (CMS-HCC)   (+) Aortic stenosis   (+) CAD in native artery   (+) Essential (primary) hypertension   (+) Hyperlipidemia   (+) Peripheral arterial disease (CMS-HCC)   (+) Pure hypercholesterolemia, unspecified   (+) Type 2 acute myocardial infarction (Multi)      Pulmonary   (+) Exertional shortness of breath      Neuro   (+) Peripheral neuropathy      /Renal   (+) Benign prostatic hyperplasia with incomplete bladder emptying      Endocrine   (+) Obesity   (+) Type 2 diabetes mellitus without complications (Multi)      Hematology   (+) Symptomatic anemia      Musculoskeletal   (+) Lumbosacral spondylosis      HEENT   (+) Mixed conductive and sensorineural hearing loss of both ears      ID   (+) Otomycosis of left ear     Past Medical History:   Diagnosis Date    Atherosclerotic heart disease of native coronary artery without angina pectoris 10/25/2022    Coronary atherosclerosis    Disorder of prostate, unspecified     Prostate disorder    Essential (primary) hypertension 12/02/2020    Hypertension    Hyperlipidemia, unspecified 10/25/2022    Hyperlipidemia    Occlusion and stenosis of unspecified carotid artery 10/25/2022    Carotid artery calcification    Personal history of other diseases of urinary system     H/O bladder problems     Past Surgical History:   Procedure Laterality Date    CORONARY ARTERY BYPASS GRAFT  11/09/2015    CABG    OTHER SURGICAL HISTORY  02/24/2014    PTA Carotid Artery    OTHER SURGICAL HISTORY  02/01/2016    Previous Stent Placement       Clinical information reviewed:   Tobacco  Allergies  Meds  Problems  Med Hx  Surg Hx   Fam Hx  Soc   Hx        NPO Detail:  No data recorded     Physical  Exam    Airway  Mallampati: II  TM distance: >3 FB  Neck ROM: full     Cardiovascular    Dental    Pulmonary    Abdominal            Anesthesia Plan    History of general anesthesia?: yes  History of complications of general anesthesia?: no    ASA 3     MAC     intravenous induction   Postoperative administration of opioids is intended.  Anesthetic plan and risks discussed with patient.  Use of blood products discussed with patient who.

## 2024-06-17 NOTE — NURSING NOTE
Assumed care of patient. Patient is A&Ox3 and is on room air. Call light in reach. Patient denies pain and is normal sinus on monitor @ 82bpm. Bed is low, locked and bed alarm is on.

## 2024-06-17 NOTE — PROGRESS NOTES
Spiritual Care Visit    Clinical Encounter Type  Visited With: Patient  Routine Visit: Introduction  Continue Visiting: No         Values/Beliefs  Spiritual Requests During Hospitalization: [place] only. No scraments needed    Sacramental Encounters  Communion: Does not want communion  Communion Given Indicator: No  Sacrament of Sick-Anointing: Patient declined anointing     Alfonso Lakhani

## 2024-06-17 NOTE — PROGRESS NOTES
06/17/24 1716   Current Planned Discharge Disposition   Current Planned Discharge Disposition Home  (Unknown at this time if pt will have discharge needs)

## 2024-06-17 NOTE — ANESTHESIA POSTPROCEDURE EVALUATION
Patient: Herminio Devlin    Procedure Summary       Date: 06/17/24 Room / Location: [facility]    Anesthesia Start: 1146 Anesthesia Stop: 1213    Procedure: EGD Diagnosis:       Symptomatic anemia      GI bleed due to NSAIDs      Melena    Scheduled Providers: CHILANGO Cheung; Emily Armendariz MD Responsible Provider: Emily Armendariz MD    Anesthesia Type: MAC ASA Status: 3            Anesthesia Type: MAC    Vitals Value Taken Time   /68 06/17/24 1245   Temp 36.3 °C (97.3 °F) 06/17/24 1210   Pulse 94 06/17/24 1245   Resp 15 06/17/24 1245   SpO2 97 % 06/17/24 1245       Anesthesia Post Evaluation    Patient location during evaluation: bedside  Patient participation: complete - patient participated  Level of consciousness: awake and alert  Pain management: adequate  Multimodal analgesia pain management approach  Airway patency: patent  Cardiovascular status: acceptable  Respiratory status: acceptable  Hydration status: acceptable  Postoperative Nausea and Vomiting: none        No notable events documented.

## 2024-06-17 NOTE — CARE PLAN
The patient's goals for the shift include feel better    The clinical goals for the shift include maintain safety    Problem: Skin  Goal: Decreased wound size/increased tissue granulation at next dressing change  Outcome: Progressing  Goal: Participates in plan/prevention/treatment measures  Outcome: Progressing  Goal: Prevent/manage excess moisture  Outcome: Progressing  Goal: Prevent/minimize sheer/friction injuries  Outcome: Progressing  Goal: Promote/optimize nutrition  Outcome: Progressing  Goal: Promote skin healing  Outcome: Progressing     Problem: Fall/Injury  Goal: Not fall by end of shift  Outcome: Progressing  Goal: Be free from injury by end of the shift  Outcome: Progressing  Goal: Verbalize understanding of personal risk factors for fall in the hospital  Outcome: Progressing  Goal: Verbalize understanding of risk factor reduction measures to prevent injury from fall in the home  Outcome: Progressing  Goal: Use assistive devices by end of the shift  Outcome: Progressing  Goal: Pace activities to prevent fatigue by end of the shift  Outcome: Progressing     Problem: Diabetes  Goal: Achieve decreasing blood glucose levels by end of shift  Outcome: Progressing  Goal: Increase stability of blood glucose readings by end of shift  Outcome: Progressing  Goal: Decrease in ketones present in urine by end of shift  Outcome: Progressing  Goal: Maintain electrolyte levels within acceptable range throughout shift  Outcome: Progressing  Goal: Maintain glucose levels >70mg/dl to <250mg/dl throughout shift  Outcome: Progressing  Goal: No changes in neurological exam by end of shift  Outcome: Progressing  Goal: Learn about and adhere to nutrition recommendations by end of shift  Outcome: Progressing  Goal: Vital signs within normal range for age by end of shift  Outcome: Progressing  Goal: Increase self care and/or family involovement by end of shift  Outcome: Progressing  Goal: Receive DSME education by end of  shift  Outcome: Progressing     Problem: Pain - Adult  Goal: Verbalizes/displays adequate comfort level or baseline comfort level  Outcome: Progressing     Problem: Safety - Adult  Goal: Free from fall injury  Outcome: Progressing     Problem: Discharge Planning  Goal: Discharge to home or other facility with appropriate resources  Outcome: Progressing     Problem: Safety - Medical Restraint  Goal: Remains free of injury from restraints (Restraint for Interference with Medical Device)  Outcome: Progressing  Goal: Free from restraint(s) (Restraint for Interference with Medical Device)  Outcome: Progressing       Over the shift, the patient did make progress toward the following goals.

## 2024-06-18 ENCOUNTER — APPOINTMENT (OUTPATIENT)
Dept: CARDIOLOGY | Facility: HOSPITAL | Age: 84
DRG: 811 | End: 2024-06-18
Payer: MEDICARE

## 2024-06-18 ENCOUNTER — PHARMACY VISIT (OUTPATIENT)
Dept: PHARMACY | Facility: CLINIC | Age: 84
End: 2024-06-18
Payer: MEDICARE

## 2024-06-18 ENCOUNTER — TELEPHONE (OUTPATIENT)
Dept: PRIMARY CARE | Facility: CLINIC | Age: 84
End: 2024-06-18

## 2024-06-18 VITALS
DIASTOLIC BLOOD PRESSURE: 60 MMHG | TEMPERATURE: 97.3 F | HEART RATE: 99 BPM | HEIGHT: 62 IN | OXYGEN SATURATION: 98 % | BODY MASS INDEX: 30.8 KG/M2 | WEIGHT: 167.4 LBS | SYSTOLIC BLOOD PRESSURE: 152 MMHG | RESPIRATION RATE: 14 BRPM

## 2024-06-18 LAB
ALBUMIN SERPL-MCNC: 3.5 G/DL (ref 3.5–5)
ALP BLD-CCNC: 44 U/L (ref 35–125)
ALT SERPL-CCNC: 14 U/L (ref 5–40)
ANION GAP SERPL CALC-SCNC: 11 MMOL/L
AST SERPL-CCNC: 34 U/L (ref 5–40)
BILIRUB SERPL-MCNC: 0.4 MG/DL (ref 0.1–1.2)
BUN SERPL-MCNC: 26 MG/DL (ref 8–25)
CALCIUM SERPL-MCNC: 8.5 MG/DL (ref 8.5–10.4)
CHLORIDE SERPL-SCNC: 111 MMOL/L (ref 97–107)
CO2 SERPL-SCNC: 25 MMOL/L (ref 24–31)
CREAT SERPL-MCNC: 1 MG/DL (ref 0.4–1.6)
EGFRCR SERPLBLD CKD-EPI 2021: 75 ML/MIN/1.73M*2
ERYTHROCYTE [DISTWIDTH] IN BLOOD BY AUTOMATED COUNT: 19.2 % (ref 11.5–14.5)
GLUCOSE SERPL-MCNC: 101 MG/DL (ref 65–99)
HCT VFR BLD AUTO: 26.4 % (ref 41–52)
HGB BLD-MCNC: 8.4 G/DL (ref 13.5–17.5)
LABORATORY COMMENT REPORT: NORMAL
MCH RBC QN AUTO: 30.5 PG (ref 26–34)
MCHC RBC AUTO-ENTMCNC: 31.8 G/DL (ref 32–36)
MCV RBC AUTO: 96 FL (ref 80–100)
NRBC BLD-RTO: 0.7 /100 WBCS (ref 0–0)
PATH REPORT.FINAL DX SPEC: NORMAL
PATH REPORT.GROSS SPEC: NORMAL
PATH REPORT.TOTAL CANCER: NORMAL
PLATELET # BLD AUTO: 209 X10*3/UL (ref 150–450)
POTASSIUM SERPL-SCNC: 3.8 MMOL/L (ref 3.4–5.1)
PROT SERPL-MCNC: 5.6 G/DL (ref 5.9–7.9)
RBC # BLD AUTO: 2.75 X10*6/UL (ref 4.5–5.9)
SODIUM SERPL-SCNC: 147 MMOL/L (ref 133–145)
WBC # BLD AUTO: 10.7 X10*3/UL (ref 4.4–11.3)

## 2024-06-18 PROCEDURE — 97116 GAIT TRAINING THERAPY: CPT | Mod: GP

## 2024-06-18 PROCEDURE — 2500000005 HC RX 250 GENERAL PHARMACY W/O HCPCS: Performed by: INTERNAL MEDICINE

## 2024-06-18 PROCEDURE — 2500000001 HC RX 250 WO HCPCS SELF ADMINISTERED DRUGS (ALT 637 FOR MEDICARE OP): Performed by: INTERNAL MEDICINE

## 2024-06-18 PROCEDURE — 97161 PT EVAL LOW COMPLEX 20 MIN: CPT | Mod: GP

## 2024-06-18 PROCEDURE — 93005 ELECTROCARDIOGRAM TRACING: CPT

## 2024-06-18 PROCEDURE — C9113 INJ PANTOPRAZOLE SODIUM, VIA: HCPCS | Performed by: INTERNAL MEDICINE

## 2024-06-18 PROCEDURE — 97535 SELF CARE MNGMENT TRAINING: CPT | Mod: GO

## 2024-06-18 PROCEDURE — 36415 COLL VENOUS BLD VENIPUNCTURE: CPT | Performed by: INTERNAL MEDICINE

## 2024-06-18 PROCEDURE — 80053 COMPREHEN METABOLIC PANEL: CPT | Performed by: INTERNAL MEDICINE

## 2024-06-18 PROCEDURE — RXMED WILLOW AMBULATORY MEDICATION CHARGE

## 2024-06-18 PROCEDURE — 85027 COMPLETE CBC AUTOMATED: CPT | Performed by: INTERNAL MEDICINE

## 2024-06-18 PROCEDURE — 99232 SBSQ HOSP IP/OBS MODERATE 35: CPT | Performed by: INTERNAL MEDICINE

## 2024-06-18 PROCEDURE — 97165 OT EVAL LOW COMPLEX 30 MIN: CPT | Mod: GO

## 2024-06-18 PROCEDURE — 2500000004 HC RX 250 GENERAL PHARMACY W/ HCPCS (ALT 636 FOR OP/ED): Performed by: INTERNAL MEDICINE

## 2024-06-18 RX ORDER — METOPROLOL TARTRATE 50 MG/1
50 TABLET ORAL 2 TIMES DAILY
Qty: 60 TABLET | Refills: 1 | Status: ON HOLD | OUTPATIENT
Start: 2024-06-18

## 2024-06-18 RX ORDER — SUCRALFATE 1 G/1
1 TABLET ORAL 4 TIMES DAILY
Qty: 120 TABLET | Refills: 1 | Status: ON HOLD | OUTPATIENT
Start: 2024-06-18 | End: 2024-07-18

## 2024-06-18 RX ORDER — PANTOPRAZOLE SODIUM 40 MG/1
40 TABLET, DELAYED RELEASE ORAL DAILY
Qty: 30 TABLET | Refills: 1 | Status: ON HOLD | OUTPATIENT
Start: 2024-06-18 | End: 2024-07-18

## 2024-06-18 RX ORDER — FERROUS SULFATE 325(65) MG
325 TABLET, DELAYED RELEASE (ENTERIC COATED) ORAL
Qty: 30 TABLET | Refills: 1 | Status: ON HOLD | OUTPATIENT
Start: 2024-06-18 | End: 2024-07-18

## 2024-06-18 ASSESSMENT — COGNITIVE AND FUNCTIONAL STATUS - GENERAL
MOBILITY SCORE: 18
WALKING IN HOSPITAL ROOM: A LITTLE
MOVING TO AND FROM BED TO CHAIR: A LITTLE
DAILY ACTIVITIY SCORE: 20
TURNING FROM BACK TO SIDE WHILE IN FLAT BAD: A LITTLE
STANDING UP FROM CHAIR USING ARMS: A LITTLE
CLIMB 3 TO 5 STEPS WITH RAILING: A LOT
HELP NEEDED FOR BATHING: A LITTLE
DRESSING REGULAR UPPER BODY CLOTHING: A LITTLE
DRESSING REGULAR LOWER BODY CLOTHING: A LITTLE
TOILETING: A LITTLE

## 2024-06-18 ASSESSMENT — PAIN - FUNCTIONAL ASSESSMENT
PAIN_FUNCTIONAL_ASSESSMENT: 0-10
PAIN_FUNCTIONAL_ASSESSMENT: 0-10

## 2024-06-18 ASSESSMENT — PAIN SCALES - GENERAL
PAINLEVEL_OUTOF10: 0 - NO PAIN
PAINLEVEL_OUTOF10: 0 - NO PAIN

## 2024-06-18 ASSESSMENT — ACTIVITIES OF DAILY LIVING (ADL)
BATHING_ASSISTANCE: MINIMAL
HOME_MANAGEMENT_TIME_ENTRY: 8
ADL_ASSISTANCE: INDEPENDENT
ADL_ASSISTANCE: INDEPENDENT

## 2024-06-18 NOTE — DISCHARGE SUMMARY
Discharge Diagnosis  Symptomatic anemia  Peptic ulcer disease    Discharge Meds     Your medication list        CONTINUE taking these medications        Instructions Last Dose Given Next Dose Due   acetaminophen 325 mg tablet  Commonly known as: TylenoL      Take 1-2 tablets (325-650 mg) by mouth every 8 hours if needed for mild pain (1 - 3). every 4-6 hours as needed       allopurinol 300 mg tablet  Commonly known as: Zyloprim      Take 1 tablet (300 mg) by mouth once daily.       gabapentin 600 mg tablet  Commonly known as: Neurontin      Take 1 tablet (600 mg) by mouth 3 times a day.       pantoprazole 40 mg injection  Commonly known as: ProtoNix      Infuse 40 mg into a venous catheter 2 times a day. Do not fill before June 16, 2024.       prochlorperazine 10 mg/2 mL (5 mg/mL) solution  Commonly known as: Compazine      Infuse 1 mL (5 mg) into a venous catheter every 6 hours if needed for nausea or vomiting.                Test Results Pending At Discharge  Pending Labs       Order Current Status    Surgical Pathology Exam In process            Hospital Course   Herminio Devlin is a 84 y.o. male presenting with shortness of breath.  Patient has history of coronary artery disease, hypertension, hyperlipidemia, BPH presents to the emergency room with with shortness of breath.  Patient actually came in this morning with nausea and vomiting.  He said he vomited 3 times and that it looked a little red.  He came to the emergency room.  He denied any pain at that time.  He said he felt overall terrible because he did not sleep last night.  He had troponins drawn that went from 70-81.  He had no ACS symptoms.  He was discharged home when he was feeling better.  He went home and then he started feeling short of breath.  The ER doctor told him to come back if he felt short of breath at all or if anything was different or worse.  So he came back to the hospital.  Repeat troponin now is 91.  EKG shows diffuse ST depression  that was not previous on prior EKG.  Case was discussed with cardiologist Dr. Salvador.  Patient was started on oral aspirin, IV heparin drip, nitroglycerin ointment.  Dr. Salvador recommended transfer to [facility] for possible heart catheterization.  There are no beds and patient is in the meantime admitted here.   EGD showed a small ulcer.  Patient was treated with Protonix, received IV iron.  He was discharged on Protonix and Carafate with close follow-up with GI in 2 to 3 weeks.  H&H and vital signs remained stable.  He is tolerating diet.    Pertinent Physical Exam At Time of Discharge  Physical Exam  HENT:      Head: Normocephalic.      Nose: Nose normal.      Mouth/Throat:      Mouth: Mucous membranes are moist.   Eyes:      Pupils: Pupils are equal, round, and reactive to light.   Cardiovascular:      Rate and Rhythm: Normal rate.   Abdominal:      Palpations: Abdomen is soft.   Musculoskeletal:         General: Normal range of motion.      Cervical back: Normal range of motion.   Skin:     General: Skin is warm.   Neurological:      General: No focal deficit present.      Mental Status: He is alert.     Outpatient Follow-Up  Future Appointments   Date Time Provider Department Center   6/25/2024  9:45 AM Donald Price MD [id number] [facility]   12/2/2024  2:30 PM Donald Price MD [id number] [facility]         Eladia Brody MD

## 2024-06-18 NOTE — PROGRESS NOTES
Subjective Data:  Patient is doing good.  Nuys any chest pain shortness of breath palpitations or dizziness.    Overnight Events:    Telemetry overnight reviewed no events.     Objective Data:  Last Recorded Vitals:  Vitals:    06/18/24 0424 06/18/24 0635 06/18/24 0731 06/18/24 0732   BP: 138/69  143/59 143/59   BP Location: Right arm  Right arm    Patient Position: Lying  Lying    Pulse: 85   79   Resp: 15      Temp: 36.8 °C (98.2 °F)  37.2 °C (99 °F)    TempSrc: Temporal  Oral    SpO2: 95%   95%   Weight:  75.9 kg (167 lb 6.4 oz)     Height:           Last Labs:  CBC - 6/18/2024:  4:02 AM  10.7 8.4 209    26.4      CMP - 6/18/2024:  4:02 AM  8.5 5.6 34 --- 0.4   _ 3.5 14 44      PTT - 6/15/2024:  6:14 PM  1.1   11.2 64.2     HGBA1C   Date/Time Value Ref Range Status   05/13/2024 08:15 AM 5.7 See below % Final   09/25/2023 08:08 AM 5.7 4.0 - 6.0 % Final     Comment:     Hemoglobin A1C levels are related to mean blood glucose during the   preceding 2-3 months. The relationship table below may be used as a   general guide. Each 1% increase in HGB A1C is a reflection of an   increase in mean glucose of approximately 30 mg/dl.   Reference: Diabetes Care, volume 29, supplement 1 Jan. 2006                        HGB A1C ................. Approx. Mean Glucose   _______________________________________________   6%   ...............................  120 mg/dl   7%   ...............................  150 mg/dl   8%   ...............................  180 mg/dl   9%   ...............................  210 mg/dl   10%  ...............................  240 mg/dl  Performed at [address] [address]     03/20/2023 08:45 AM 5.9 4.0 - 6.0 % Final     Comment:     Hemoglobin A1C levels are related to mean blood glucose during the   preceding 2-3 months. The relationship table below may be used as a   general guide. Each 1% increase in HGB A1C is a reflection of an   increase in mean glucose of approximately 30  mg/dl.   Reference: Diabetes Care, volume 29, supplement 1 Jan. 2006                        HGB A1C ................. Approx. Mean Glucose   _______________________________________________   6%   ...............................  120 mg/dl   7%   ...............................  150 mg/dl   8%   ...............................  180 mg/dl   9%   ...............................  210 mg/dl   10%  ...............................  240 mg/dl  Performed at [address] [address]     07/27/2020 11:42 AM 5.9 4.3 - 5.6 % Final     Comment:     American Diabetes Association guidelines indicate that patients with HgbA1c in   the range 5.7-6.4% are at increased risk for development of diabetes, and   intervention by lifestyle modification may be beneficial. HgbA1c greater or   equal to 6.5% is considered diagnostic of diabetes.     LDLCALC   Date/Time Value Ref Range Status   05/13/2024 08:15 AM 18 65 - 130 mg/dL Final   09/25/2023 08:08 AM 4 65 - 130 MG/DL Final   03/20/2023 08:45 AM 9 65 - 130 MG/DL Final   09/15/2022 08:43 AM 10 65 - 130 MG/DL Final     VLDL   Date/Time Value Ref Range Status   11/04/2020 08:20 AM 37 0 - 40 mg/dL Final   08/04/2020 08:15 AM 46 0 - 40 mg/dL Final   05/28/2020 08:13 AM 35 0 - 40 mg/dL Final      Last I/O:  I/O last 3 completed shifts:  In: 520 (6.8 mL/kg) [P.O.:120; I.V.:100 (1.3 mL/kg); Blood:300]  Out: 0 (0 mL/kg)   Weight: 75.9 kg     Past Cardiology Tests (Last 3 Years):  EKG:  ECG 12 lead 06/15/2024    Echo:  Transthoracic Echo (TTE) Complete 03/11/2024    Ejection Fractions:  EF   Date/Time Value Ref Range Status   03/11/2024 01:41 PM 66 %      Cath:  No results found for this or any previous visit from the past 1095 days.    Stress Test:  No results found for this or any previous visit from the past 1095 days.    Cardiac Imaging:  No results found for this or any previous visit from the past 1095 days.      Inpatient Medications:  Scheduled medications   Medication  Dose Route Frequency    albuterol  2.5 mg nebulization Once    allopurinol  300 mg oral Daily    gabapentin  600 mg oral TID    iron sucrose  200 mg intravenous Daily    lactated Ringer's  100 mL/hr intravenous Once    metoprolol  5 mg intravenous q6h    pantoprazole  40 mg intravenous BID     PRN medications   Medication    acetaminophen    acetaminophen    fentaNYL PF    fentaNYL PF    OLANZapine    ondansetron    oxygen    prochlorperazine     Continuous Medications   Medication Dose Last Rate       Physical Exam:  General: Patient is in no acute distress.  HEENT: atraumatic normocephalic.  Neck: is supple jugular venous pressure within normal limits no thyromegaly.  Cardiovascular regular rate and rhythm normal heart sounds no murmurs rubs or gallops.  Lungs: clear to auscultation bilaterally.  Abdomen: is soft nontender.  Extremities warm to touch no edema.       Assessment/Plan   #1 acute non-ST elevation myocardial infarction in the setting of severe GI bleeding.  Patient likely has demand ischemia from severe anemia.  He has history of CABG.  Follows with Dr. Yeboah as an outpatient.  For now hold aspirin for a week or so until being seen by Dr. Yeboah.  EKG still showing mild ST depression but patient asymptomatic.  Stable from my standpoint recommend high intensity statin spot start aspirin was not safe from GI standpoint.  Follow-up as an outpatient with Dr. Yeboah.    2.  Hypertension restart home medications.    3.  Hyperlipidemia recommend high intensity statin.    Thank you for allowing me to participate in his care  Peripheral IV 06/15/24 20 G Distal;Right;Posterior Forearm (Active)   Site Assessment Clean;Dry;Intact 06/17/24 2100   Dressing Status Clean;Dry 06/17/24 2100   Number of days: 3       Peripheral IV 06/15/24 20 G Left;Posterior Forearm (Active)   Site Assessment Clean;Dry;Intact 06/17/24 2100   Dressing Status Clean;Dry 06/17/24 2100   Number of days: 3       Code  Status:  Full Code      Rafa Salvador MD

## 2024-06-18 NOTE — PROGRESS NOTES
06/18/24 1024   Discharge Planning   Home or Post Acute Services In home services   Type of Home Care Services Home OT;Home PT   Patient expects to be discharged to: Home with [facility] referrals sent     TCC spoke to patient, updated regarding therapy recommendations. Patient agreeable to C at this time.

## 2024-06-18 NOTE — PROGRESS NOTES
"Herminio Devlin is a 84 y.o. male on day 3 of admission presenting with Symptomatic anemia.    Subjective   Doing okay.  Tolerating diet no reports of overt bleeding or melena.  EGD yesterday with Dr. Smith showed healed prepyloric ulcer shallow, recommend repeat EGD in 6 weeks       Objective     Physical Exam  Vitals reviewed.   Constitutional:       General: He is not in acute distress.  HENT:      Head: Normocephalic.   Eyes:      Pupils: Pupils are equal, round, and reactive to light.   Cardiovascular:      Rate and Rhythm: Normal rate and regular rhythm.      Heart sounds: No murmur heard.     No gallop.   Pulmonary:      Breath sounds: Normal breath sounds. No stridor. No wheezing, rhonchi or rales.   Abdominal:      General: Bowel sounds are normal. There is no distension.      Palpations: Abdomen is soft.      Tenderness: There is no abdominal tenderness. There is no guarding or rebound.   Skin:     General: Skin is warm.      Capillary Refill: Capillary refill takes less than 2 seconds.      Coloration: Skin is not jaundiced or pale.   Neurological:      Mental Status: He is alert and oriented to person, place, and time.         Last Recorded Vitals  Blood pressure 152/60, pulse 99, temperature 36.3 °C (97.3 °F), temperature source Temporal, resp. rate 14, height 1.575 m (5' 2\"), weight 75.9 kg (167 lb 6.4 oz), SpO2 98%.  Intake/Output last 3 Shifts:  I/O last 3 completed shifts:  In: 520 (6.8 mL/kg) [P.O.:120; I.V.:100 (1.3 mL/kg); Blood:300]  Out: 0 (0 mL/kg)   Weight: 75.9 kg     Relevant Results  Results for orders placed or performed during the hospital encounter of 06/15/24 (from the past 24 hour(s))   Type and screen   Result Value Ref Range    ABO TYPE O     Rh TYPE POS     ANTIBODY SCREEN NEG    VERIFY ABO/Rh Group Test   Result Value Ref Range    ABO TYPE O     Rh TYPE POS    POCT GLUCOSE   Result Value Ref Range    POCT Glucose 118 (H) 74 - 99 mg/dL   CBC   Result Value Ref Range    WBC " 10.7 4.4 - 11.3 x10*3/uL    nRBC 0.7 (H) 0.0 - 0.0 /100 WBCs    RBC 2.75 (L) 4.50 - 5.90 x10*6/uL    Hemoglobin 8.4 (L) 13.5 - 17.5 g/dL    Hematocrit 26.4 (L) 41.0 - 52.0 %    MCV 96 80 - 100 fL    MCH 30.5 26.0 - 34.0 pg    MCHC 31.8 (L) 32.0 - 36.0 g/dL    RDW 19.2 (H) 11.5 - 14.5 %    Platelets 209 150 - 450 x10*3/uL   Comprehensive Metabolic Panel   Result Value Ref Range    Glucose 101 (H) 65 - 99 mg/dL    Sodium 147 (H) 133 - 145 mmol/L    Potassium 3.8 3.4 - 5.1 mmol/L    Chloride 111 (H) 97 - 107 mmol/L    Bicarbonate 25 24 - 31 mmol/L    Urea Nitrogen 26 (H) 8 - 25 mg/dL    Creatinine 1.00 0.40 - 1.60 mg/dL    eGFR 75 >60 mL/min/1.73m*2    Calcium 8.5 8.5 - 10.4 mg/dL    Albumin 3.5 3.5 - 5.0 g/dL    Alkaline Phosphatase 44 35 - 125 U/L    Total Protein 5.6 (L) 5.9 - 7.9 g/dL    AST 34 5 - 40 U/L    Bilirubin, Total 0.4 0.1 - 1.2 mg/dL    ALT 14 5 - 40 U/L    Anion Gap 11 <=19 mmol/L        Result Date: 6/17/2024  Poor data quality, interpretation may be adversely affected Normal sinus rhythm Nonspecific ST and T wave abnormality Abnormal ECG When compared with ECG of 14-AUG-2023 11:14, No significant change was found Confirmed by Bennie Altman ([id number]) on 6/17/2024 5:09:40 PM    Esophagogastroduodenoscopy (EGD)    Result Date: 6/17/2024  Table formatting from the original result was not included. Impression Irregular Z-line Small hiatal hernia Single 1cm ulcer prepyloric shallow Mild abnormal mucosa, consistent with gastritis in the antrum; performed cold forceps biopsy Findings Irregular Z-line Small hiatal hernia Single small benign-appearing ulcer Mild abnormal mucosa in the antrum, consistent with gastritis; performed cold forceps biopsy to rule out H. pylori; Recommendation  Continue Protonix Avoid NSAIDS Follow up biopsies Repeat EGD to document healing in 6 weeks Indication Melena, GI bleed due to NSAIDs, Symptomatic anemia Staff Staff Role Yaya Smith MD Proceduralist Medications See  Anesthesia Record. Preprocedure A history and physical has been performed, and patient medication allergies have been reviewed. The patient's tolerance of previous anesthesia has been reviewed. The risks and benefits of the procedure and the sedation options and risks were discussed with the patient. All questions were answered and informed consent obtained. Details of the Procedure The patient underwent monitored anesthesia care, which was administered by an anesthesia professional. The patient's blood pressure, ECG, ETCO2, heart rate, level of consciousness, oxygen and respirations were monitored throughout the procedure. The scope was introduced through the mouth and advanced to the second part of the duodenum. Retroflexion was performed in the cardia. Events Procedure Events Event Event Time ENDO SCOPE IN TIME 6/17/2024 11:54 AM ENDO SCOPE OUT TIME 6/17/2024 11:58 AM Specimens ID Type Source Tests Collected by Time 1 : r/o H Pylori Tissue STOMACH ANTRUM BIOPSY SURGICAL PATHOLOGY EXAM Abigail Reis RN 6/17/2024 1157 Procedure Location [address] [address] [phone] Referring Provider Sujatha Owusu, APRN-CNP Procedure Provider Yaya Smith MD     XR chest 1 view    Result Date: 6/15/2024  Interpreted By:  Ozzie Romano, STUDY: XR CHEST 1 VIEW;  6/15/2024 3:58 pm   INDICATION: Signs/Symptoms:SOB, CP.   COMPARISON: None.   ACCESSION NUMBER(S): [id number]   ORDERING CLINICIAN: TANJA RICKETTS   FINDINGS: Mediastinal wires present.   CARDIOMEDIASTINAL SILHOUETTE: Cardiomediastinal silhouette is normal in size and configuration.   LUNGS: Lungs are clear. There is mild hyperinflation. There is no consolidation or edema   ABDOMEN: No remarkable upper abdominal findings.   BONES: No acute osseous changes.       1.  No evidence of acute cardiopulmonary process.       MACRO: None   Signed by: Ozzie Romano 6/15/2024 4:10  PM Dictation workstation:   [id number]    * Cannot find OR log *  Last relevant procedure:                          Assessment/Plan   Principal Problem:    Symptomatic anemia  Active Problems:    Type 2 acute myocardial infarction (Multi)    Anemia/Melena/Anticoagulation   Normocytic anemia Hgb 7.7  Suspected upper GI bleed   -Hemoglobin stable today at 8.4, overt bleeding or melena.   -Will need close follow-up with our office in 2 to 3 weeks.  Repeat EGD in 6 weeks to assess degree of healing.  Will discharge on PPI and Carafate      CHRISTOFER Prado-CNP

## 2024-06-18 NOTE — TELEPHONE ENCOUNTER
[facility] with [facility] Gave the verbal orders per JAMIROSLAVA to follow the patient for homecare services

## 2024-06-18 NOTE — TELEPHONE ENCOUNTER
Jojo, from [facility] called to see if NAMRATA is willing to follow patient for homecare. It will be for skilled nurse PT and OT. He is being discharged from [facility] today.  Jojo's phone #: [phone]

## 2024-06-18 NOTE — PROGRESS NOTES
Spiritual Care Visit    Clinical Encounter Type  Visited With: Patient and family together  Routine Visit: Follow-up  Continue Visiting: No         Values/Beliefs  Spiritual Requests During Hospitalization: [place] only     Alfonso Lakhani

## 2024-06-18 NOTE — CARE PLAN
The patient's goals for the shift include feel better    The clinical goals for the shift include patient would like to go home today    Over the shift, the patient did make progress toward the following goals.    Problem: Skin  Goal: Decreased wound size/increased tissue granulation at next dressing change  Outcome: Met  Goal: Participates in plan/prevention/treatment measures  Outcome: Met  Goal: Prevent/manage excess moisture  Outcome: Met  Goal: Prevent/minimize sheer/friction injuries  Outcome: Met  Goal: Promote/optimize nutrition  Outcome: Met  Goal: Promote skin healing  Outcome: Met     Problem: Fall/Injury  Goal: Not fall by end of shift  Outcome: Met  Goal: Be free from injury by end of the shift  Outcome: Met  Goal: Verbalize understanding of personal risk factors for fall in the hospital  Outcome: Met  Goal: Verbalize understanding of risk factor reduction measures to prevent injury from fall in the home  Outcome: Met  Goal: Use assistive devices by end of the shift  Outcome: Met  Goal: Pace activities to prevent fatigue by end of the shift  Outcome: Met     Problem: Diabetes  Goal: Achieve decreasing blood glucose levels by end of shift  Outcome: Met  Goal: Increase stability of blood glucose readings by end of shift  Outcome: Met  Goal: Decrease in ketones present in urine by end of shift  Outcome: Met  Goal: Maintain electrolyte levels within acceptable range throughout shift  Outcome: Met  Goal: Maintain glucose levels >70mg/dl to <250mg/dl throughout shift  Outcome: Met  Goal: No changes in neurological exam by end of shift  Outcome: Met  Goal: Learn about and adhere to nutrition recommendations by end of shift  Outcome: Met  Goal: Vital signs within normal range for age by end of shift  Outcome: Met  Goal: Increase self care and/or family involovement by end of shift  Outcome: Met  Goal: Receive DSME education by end of shift  Outcome: Met     Problem: Pain - Adult  Goal: Verbalizes/displays  adequate comfort level or baseline comfort level  Outcome: Met     Problem: Safety - Adult  Goal: Free from fall injury  Outcome: Met     Problem: Discharge Planning  Goal: Discharge to home or other facility with appropriate resources  Outcome: Met     Problem: Safety - Medical Restraint  Goal: Remains free of injury from restraints (Restraint for Interference with Medical Device)  Outcome: Met  Goal: Free from restraint(s) (Restraint for Interference with Medical Device)  Outcome: Met

## 2024-06-18 NOTE — PROGRESS NOTES
06/18/24 1103   Discharge Planning   Patient expects to be discharged to: [facility] with [facility]     Will need external referral for home health upon discharge

## 2024-06-18 NOTE — PROGRESS NOTES
Occupational Therapy    Evaluation/Treatment    Patient Name: Herminio Devlin  MRN: [id number]  : 1940  Today's Date: 24  Time Calculation  Start Time: 732  Stop Time: 755  Time Calculation (min): 23 min       Assessment:  OT Assessment: OT order received, chart reviewed, evaluation completed. Pt demonstrated mild deficits in ADLs and functional mobility and would benefit from acute OT services to faciliate return to PLOF.  Prognosis: Good  Barriers to Discharge: None  Evaluation/Treatment Tolerance: Patient tolerated treatment well  Medical Staff Made Aware: Yes  End of Session Communication: Bedside nurse  End of Session Patient Position: Up in chair, Alarm on  OT Assessment Results: Decreased ADL status, Decreased upper extremity strength, Decreased endurance, Decreased functional mobility, Decreased IADLs  Prognosis: Good  Barriers to Discharge: None  Evaluation/Treatment Tolerance: Patient tolerated treatment well  Medical Staff Made Aware: Yes  Strengths: Ability to acquire knowledge  Barriers to Participation: Comorbidities  Plan:  Treatment Interventions: ADL retraining, Functional transfer training, UE strengthening/ROM, Endurance training, Patient/family training, Equipment evaluation/education  OT Frequency: 3 times per week  OT Discharge Recommendations: Low intensity level of continued care  Equipment Recommended upon Discharge: Wheeled walker  OT Recommended Transfer Status: Minimal assist  OT - OK to Discharge: Yes  Treatment Interventions: ADL retraining, Functional transfer training, UE strengthening/ROM, Endurance training, Patient/family training, Equipment evaluation/education    Subjective   Current Problem:  1. Symptomatic anemia  Esophagogastroduodenoscopy (EGD)    Esophagogastroduodenoscopy (EGD)    Transthoracic Echo (TTE) Complete    Transthoracic Echo (TTE) Complete    Surgical Pathology Exam    Surgical Pathology Exam      2. GI bleed due to NSAIDs  Esophagogastroduodenoscopy  (EGD)    Esophagogastroduodenoscopy (EGD)      3. Melena  Esophagogastroduodenoscopy (EGD)    Esophagogastroduodenoscopy (EGD)      4. Myocardial infarction type 2 (Multi)  Transthoracic Echo (TTE) Complete    Transthoracic Echo (TTE) Complete        General:   OT Received On: 06/18/24  General  Reason for Referral: activities of daily living, pt is an 84 year-old M admitted with c/o SOB; (+) anemia, s/p  transfusion of 1 unit of PRBCs and EGD.  Referred By: Herminio Shepherd DO  Past Medical History Relevant to Rehab: BPH, CAD, DM, gout, HLD, PAD, peripheral neuropathy.  Family/Caregiver Present: No  Co-Treatment: PT  Co-Treatment Reason: to optimize pt outcomes/safety  Prior to Session Communication: Bedside nurse  Patient Position Received: Bed, 3 rail up, Alarm off, not on at start of session  Preferred Learning Style: verbal  General Comment: Pt cleared for therapy via RN, received in supine, NAD, agreeable to participate in therapy. (+) telemetry  Precautions:  Hearing/Visual Limitations: glasses; B hearing aids  Medical Precautions: Fall precautions  Vital Signs:  Heart Rate: 79  SpO2: 95 %  BP: 143/59  MAP (mmHg): 80  Pain:  Pain Assessment  Pain Assessment: 0-10  Pain Score: 0 - No pain    Objective   Cognition:  Overall Cognitive Status: Within Functional Limits  Orientation Level: Oriented X4           Home Living:  Type of Home: House  Lives With: Spouse  Home Adaptive Equipment: Walker rolling or standard, Cane  Home Layout: One level  Home Access: Stairs to enter with rails  Entrance Stairs-Rails: Right  Entrance Stairs-Number of Steps: 3  Bathroom Shower/Tub: Walk-in shower  Bathroom Toilet: Standard  Bathroom Equipment: Grab bars in shower  Prior Function:  Level of [place]: Independent with ADLs and functional transfers, Independent with homemaking with ambulation  Receives Help From: Family  ADL Assistance: Independent  Homemaking Assistance: Independent  Ambulatory Assistance:  Independent  Vocational: Retired  Prior Function Comments: denies h/o falls  IADL History:     ADL:  Eating Deficit: Setup  Grooming Assistance: Stand by  Grooming Deficit: Verbal cueing, Wash/dry hands  Bathing Assistance: Minimal  UE Dressing Assistance: Minimal  LE Dressing Assistance: Moderate  LE Dressing Deficit: Thread RLE into pants, Thread LLE into pants, Pull up over hips (assist to thread L Le pt able to thread R, assist to pull up)  Toileting Assistance with Device: Minimal  Toileting Deficit: Clothing management up, Clothing management down (assist for balance during clothing management)  Functional Assistance: Minimal    Activity Tolerance:  Endurance: Tolerates 10 - 20 min exercise with multiple rests  Rate of Perceived Exertion (RPE): 3/10  Functional Standing Tolerance:     Bed Mobility/Transfers: Bed Mobility  Bed Mobility: Yes  Bed Mobility 1  Bed Mobility 1: Supine to sitting  Level of Assistance 1: Close supervision  Bed Mobility Comments 1: HOB elevated and use of bed rails    Transfers  Transfer: Yes  Transfer 1  Transfer From 1: Bed to  Transfer to 1: Stand  Technique 1: Sit to stand  Transfer Level of Assistance 1: Close supervision  Trials/Comments 1: cues for safe transfer technique  Transfers 2  Transfer From 2: Stand to  Transfer to 2: Toilet  Technique 2: Stand to sit, Sit to stand  Transfer Level of Assistance 2: Close supervision  Trials/Comments 2: cues for grab bar use.  Transfers 3  Transfer From 3: Stand to  Transfer to 3: Chair with arms  Technique 3: Stand to sit  Transfer Level of Assistance 3: Close supervision  Trials/Comments 3: cues for safe hand placement remained with needs in reach.    Functional Mobility:  Functional Mobility  Functional Mobility Performed: Yes  Functional Mobility 1  Surface 1: Level tile  Assistance 1: Minimum assistance  Comments 1: Pt performed functional mobility with min A and had two small LOBs but gradually improved household distance to and  from bathroom in room.    Sensation:  Light Touch: No apparent deficits  Strength:  Strength Comments: B UEs 4/5    Coordination:  Movements are Fluid and Coordinated: Yes   Hand Function:  Hand Function  Gross Grasp: Functional  Coordination: Functional  Extremities: RUE   RUE : Within Functional Limits and LUE   LUE: Within Functional Limits    Outcome Measures: [facility] Daily Activity  Putting on and taking off regular lower body clothing: A little  Bathing (including washing, rinsing, drying): A little  Putting on and taking off regular upper body clothing: A little  Toileting, which includes using toilet, bedpan or urinal: A little  Taking care of personal grooming such as brushing teeth: None  Eating Meals: None  Daily Activity - Total Score: 20        Education Documentation  Handouts, taught by Jojo Hopper OT at 6/18/2024  9:39 AM.  Learner: Patient  Readiness: Acceptance  Method: Explanation  Response: Verbalizes Understanding  Comment: Pt edu on OT POC    Body Mechanics, taught by Jojo Hopper OT at 6/18/2024  9:39 AM.  Learner: Patient  Readiness: Acceptance  Method: Explanation  Response: Verbalizes Understanding  Comment: Pt edu on OT POC    Precautions, taught by Jojo Hopper OT at 6/18/2024  9:39 AM.  Learner: Patient  Readiness: Acceptance  Method: Explanation  Response: Verbalizes Understanding  Comment: Pt edu on OT POC    Home Exercise Program, taught by Jojo Hopper OT at 6/18/2024  9:39 AM.  Learner: Patient  Readiness: Acceptance  Method: Explanation  Response: Verbalizes Understanding  Comment: Pt edu on OT POC    ADL Training, taught by Jojo Hopper OT at 6/18/2024  9:39 AM.  Learner: Patient  Readiness: Acceptance  Method: Explanation  Response: Verbalizes Understanding  Comment: Pt edu on OT POC        Goals:  Encounter Problems       Encounter Problems (Active)       OT Goals       ADLs (Progressing)       Start:  06/18/24    Expected End:  07/12/24       Pt will complete ADL  tasks with Mod I, using AE as needed, in order to complete self-care tasks.           Functional mobility (Progressing)       Start:  06/18/24    Expected End:  07/12/24       Pt will perform functional mobility household distance at mod ind level with LRAD           Functional transfers (Progressing)       Start:  06/18/24    Expected End:  07/12/24       Pt will perform functional transfers at mod ind level with RW           Therapeutic exercise (Progressing)       Start:  06/18/24    Expected End:  07/12/24       Pt will perform upper body therapeutic exercises all joints/planes of motion independently

## 2024-06-18 NOTE — PROGRESS NOTES
Physical Therapy    Physical Therapy Evaluation & Treatment    Patient Name: Herminio Devlin  MRN: [id number]  Today's Date: 6/18/2024   Time Calculation  Start Time: 0732  Stop Time: 0755  Time Calculation (min): 23 min    Assessment/Plan   PT Assessment  PT Assessment Results: Decreased strength, Decreased endurance, Impaired balance, Decreased mobility, Decreased safety awareness  Rehab Prognosis: Good  Evaluation/Treatment Tolerance: Patient tolerated treatment well  Medical Staff Made Aware: Yes  Strengths: Ability to acquire knowledge, Premorbid level of function, Support of extended family/friends, Support and attitude of living partners  Barriers to Participation: Comorbidities  End of Session Communication: Bedside nurse  Assessment Comment: Pt demonstrates impaired functional mobility from baseline level; pt with mild balance/BLE strength deficits and decreased overall activity tolerance; pt would benefit from continued skilled PT services during hospital stay to maximize functional mobility outcomes upon d/c.  End of Session Patient Position: Up in chair, Alarm on   IP OR SWING BED PT PLAN  Inpatient or Swing Bed: Inpatient  PT Plan  Treatment/Interventions: Bed mobility, Transfer training, Gait training, Stair training, Balance training, Strengthening, Endurance training, Therapeutic exercise, Therapeutic activity  PT Plan: Ongoing PT  PT Frequency: 4 times per week  PT Discharge Recommendations: Low intensity level of continued care  Equipment Recommended upon Discharge: Wheeled walker  PT Recommended Transfer Status: Contact guard  PT - OK to Discharge: Yes    Subjective     General Visit Information:  General  Reason for Referral: impaired functional mobility; pt is an 84 year-old M admitted with c/o SOB; (+) anemia, s/p  transfusion of 1 unit of PRBCs and EGD.  Referred By: Herminio Shepherd DO  Past Medical History Relevant to Rehab: BPH, CAD, DM, gout, HLD, PAD, peripheral  neuropathy.  Family/Caregiver Present: No  Co-Treatment: OT  Co-Treatment Reason: to optimize pt outcomes/safety  Prior to Session Communication: Bedside nurse  Patient Position Received: Bed, 3 rail up, Alarm off, not on at start of session  Preferred Learning Style: verbal  General Comment: Pt cleared for therapy via RN, received in supine, NAD, agreeable to participate in therapy. (+) telemetry  Home Living:  Home Living  Type of Home: House  Lives With: Spouse  Home Adaptive Equipment: Walker rolling or standard, Cane  Home Layout: One level  Home Access: Stairs to enter with rails  Entrance Stairs-Rails: Right  Entrance Stairs-Number of Steps: 3  Bathroom Shower/Tub: Walk-in shower  Bathroom Toilet: Standard  Bathroom Equipment: Grab bars in shower  Prior Level of Function:  Prior Function Per Pt/Caregiver Report  Level of [place]: Independent with ADLs and functional transfers, Independent with homemaking with ambulation  Receives Help From: Family  ADL Assistance: Independent  Homemaking Assistance: Independent  Ambulatory Assistance: Independent  Vocational: Retired  Prior Function Comments: denies h/o falls  Precautions:  Precautions  Hearing/Visual Limitations: glasses; B hearing aids  Medical Precautions: Fall precautions  Vital Signs:  Vital Signs  Heart Rate: 79  SpO2: 95 %  BP: 143/59  MAP (mmHg): 80    Objective   Pain:  Pain Assessment  Pain Assessment: 0-10  Pain Score: 0 - No pain  Cognition:  Cognition  Overall Cognitive Status: Within Functional Limits  Orientation Level: Oriented X4    General Assessments:  General Observation  General Observation: pleasant/cooperative throughout     Activity Tolerance  Endurance: Tolerates 10 - 20 min exercise with multiple rests    Sensation  Light Touch: No apparent deficits  Sensation Comment: pt denies paresthesias    Strength  Strength Comments: BLE > 4-/5  Strength  Strength Comments: BLE > 4-/5    Coordination  Movements are Fluid and Coordinated:  Yes    Postural Control  Postural Control: Within Functional Limits    Static Sitting Balance  Static Sitting-Balance Support: Feet supported  Static Sitting-Level of Assistance: Close supervision    Static Standing Balance  Static Standing-Balance Support: No upper extremity supported  Static Standing-Level of Assistance: Contact guard  Functional Assessments:     Bed Mobility  Bed Mobility: Yes  Bed Mobility 1  Bed Mobility 1: Supine to sitting  Level of Assistance 1: Close supervision  Bed Mobility Comments 1: use of bedrails    Transfers  Transfer: Yes  Transfer 1  Transfer From 1: Sit to  Transfer to 1: Stand  Technique 1: Sit to stand  Transfer Level of Assistance 1: Close supervision  Transfers 2  Transfer From 2: Stand to  Transfer to 2: Sit  Technique 2: Stand to sit  Transfer Level of Assistance 2: Close supervision    Ambulation/Gait Training  Ambulation/Gait Training Performed: Yes  Ambulation/Gait Training 1  Surface 1: Level tile  Device 1: No device  Assistance 1: Contact guard  Quality of Gait 1: Narrow base of support, Decreased step length (decreased kimberly, reciprocating pattern, 1-2 episodes of mild LOB with PT assist for balance)  Comments/Distance (ft) 1: 40' x 2    Stairs  Stairs: No    Extremity/Trunk Assessments:  RLE   RLE : Within Functional Limits  LLE   LLE : Within Functional Limits  Treatments:  Ambulation/Gait Training  Ambulation/Gait Training Performed: Yes  Ambulation/Gait Training 1  Surface 1: Level tile  Device 1: No device  Assistance 1: Contact guard  Quality of Gait 1: Narrow base of support, Decreased step length (decreased kimberly, reciprocating pattern, 1-2 episodes of mild LOB with PT assist for balance)  Comments/Distance (ft) 1: 40' x 2  Outcome Measures:  [facility] Basic Mobility  Turning from your back to your side while in a flat bed without using bedrails: None  Moving from lying on your back to sitting on the side of a flat bed without using bedrails: A little  Moving  to and from bed to chair (including a wheelchair): A little  Standing up from a chair using your arms (e.g. wheelchair or bedside chair): A little  To walk in hospital room: A little  Climbing 3-5 steps with railing: A lot  Basic Mobility - Total Score: 18    Encounter Problems       Encounter Problems (Active)       Mobility       STG - Patient will ambulate 150' with LRAD and modified independence. (Progressing)       Start:  06/18/24    Expected End:  07/18/24            STG - Patient will ascend and descend three stairs with use of unilateral handrail and modified independence. (Not Progressing)       Start:  06/18/24    Expected End:  07/18/24               PT Transfers       STG - Patient to transfer to and from sit to supine with independence. (Progressing)       Start:  06/18/24    Expected End:  07/18/24            STG - Patient will transfer sit to and from stand with independence. (Progressing)       Start:  06/18/24    Expected End:  07/18/24               Pain - Adult              Education Documentation  Mobility Training, taught by Beth Ridley, PT at 6/18/2024  9:00 AM.  Learner: Patient  Readiness: Acceptance  Method: Explanation, Demonstration  Response: Needs Reinforcement    Education Comments  No comments found.

## 2024-06-19 ENCOUNTER — PATIENT OUTREACH (OUTPATIENT)
Dept: PRIMARY CARE | Facility: CLINIC | Age: 84
End: 2024-06-19
Payer: MEDICARE

## 2024-06-19 ENCOUNTER — DOCUMENTATION (OUTPATIENT)
Dept: PRIMARY CARE | Facility: CLINIC | Age: 84
End: 2024-06-19
Payer: MEDICARE

## 2024-06-19 NOTE — PROGRESS NOTES
Discharge Facility: [facility]     Discharge Diagnosis:    Symptomatic anemia  Peptic ulcer disease      Admission Date: 6/15/2024   Discharge Date:  6/18/2024     PCP Appointment Date: 6/25/2024     Specialist Appointment Date:     Referral to [facility] placed by hospital     Follow up with Yaya Smith MD (Gastroenterology)     Hospital Encounter and Summary: Linked     Two attempts were made to reach patient within two business days after discharge. Voicemail left with contact information for patient to call back with any non-emergent questions or concerns.

## 2024-06-20 ENCOUNTER — TELEPHONE (OUTPATIENT)
Dept: PRIMARY CARE | Facility: CLINIC | Age: 84
End: 2024-06-20
Payer: MEDICARE

## 2024-06-20 NOTE — TELEPHONE ENCOUNTER
NURSE ARTURO FROM [facility] IS CALLING REGARDING MEDICATION ERROR. SHE'S CHECKING TO SEE IF NAMRATA RECOMMENDS PATIENT GO BACK TO HOSPITAL BEFORE HOME CARE IS STARTED.    ARTURO CAN BE REACHED [phone]

## 2024-06-20 NOTE — TELEPHONE ENCOUNTER
I spoke to Justine.  Apparently he had been on metoprolol succinate 100 daily.  When in the hospital he was switched to 50 of the tartrate twice daily.  When he got home he actually took 100 of the succinate in the morning and 50 of the tartrate evening.  He was complaining of some shortness of breath when the nurse was there his O2 sat was fine at 95 his blood pressure and pulse were stable.  He felt a little better after sitting still for a while. I recommended that he continue getting the metoprolol to tartrate 50 twice daily and as long as his vitals were fine no further testing or transfer is necessary.  If symptoms become worse again should let me know.

## 2024-06-22 ENCOUNTER — APPOINTMENT (OUTPATIENT)
Dept: CARDIOLOGY | Facility: HOSPITAL | Age: 84
DRG: 280 | End: 2024-06-22
Payer: MEDICARE

## 2024-06-22 ENCOUNTER — APPOINTMENT (OUTPATIENT)
Dept: RADIOLOGY | Facility: HOSPITAL | Age: 84
DRG: 280 | End: 2024-06-22
Payer: MEDICARE

## 2024-06-22 ENCOUNTER — HOSPITAL ENCOUNTER (INPATIENT)
Facility: HOSPITAL | Age: 84
DRG: 280 | End: 2024-06-22
Attending: STUDENT IN AN ORGANIZED HEALTH CARE EDUCATION/TRAINING PROGRAM | Admitting: INTERNAL MEDICINE
Payer: MEDICARE

## 2024-06-22 DIAGNOSIS — R06.02 SHORTNESS OF BREATH: ICD-10-CM

## 2024-06-22 DIAGNOSIS — R10.9 RIGHT FLANK PAIN: ICD-10-CM

## 2024-06-22 DIAGNOSIS — I50.31 ACUTE DIASTOLIC CONGESTIVE HEART FAILURE (MULTI): ICD-10-CM

## 2024-06-22 DIAGNOSIS — I21.A1 TYPE 2 ACUTE MYOCARDIAL INFARCTION (MULTI): ICD-10-CM

## 2024-06-22 DIAGNOSIS — T39.395A GI BLEED DUE TO NSAIDS: ICD-10-CM

## 2024-06-22 DIAGNOSIS — K92.1 MELENA: ICD-10-CM

## 2024-06-22 DIAGNOSIS — D64.9 SYMPTOMATIC ANEMIA: ICD-10-CM

## 2024-06-22 DIAGNOSIS — K92.2 GI BLEED DUE TO NSAIDS: ICD-10-CM

## 2024-06-22 DIAGNOSIS — I50.9 ACUTE HEART FAILURE, UNSPECIFIED HEART FAILURE TYPE (MULTI): Primary | ICD-10-CM

## 2024-06-22 LAB
ALBUMIN SERPL-MCNC: 4 G/DL (ref 3.5–5)
ALBUMIN SERPL-MCNC: 4 G/DL (ref 3.5–5)
ALP BLD-CCNC: 76 U/L (ref 35–125)
ALP BLD-CCNC: 76 U/L (ref 35–125)
ALT SERPL-CCNC: 19 U/L (ref 5–40)
ALT SERPL-CCNC: 19 U/L (ref 5–40)
ANION GAP SERPL CALC-SCNC: 12 MMOL/L
AST SERPL-CCNC: 42 U/L (ref 5–40)
AST SERPL-CCNC: 42 U/L (ref 5–40)
BASOPHILS # BLD MANUAL: 0 X10*3/UL (ref 0–0.1)
BASOPHILS NFR BLD MANUAL: 0 %
BILIRUB DIRECT SERPL-MCNC: <0.2 MG/DL (ref 0–0.2)
BILIRUB SERPL-MCNC: 0.3 MG/DL (ref 0.1–1.2)
BILIRUB SERPL-MCNC: 0.3 MG/DL (ref 0.1–1.2)
BUN SERPL-MCNC: 18 MG/DL (ref 8–25)
CALCIUM SERPL-MCNC: 8.5 MG/DL (ref 8.5–10.4)
CHLORIDE SERPL-SCNC: 103 MMOL/L (ref 97–107)
CO2 SERPL-SCNC: 24 MMOL/L (ref 24–31)
CREAT SERPL-MCNC: 1.1 MG/DL (ref 0.4–1.6)
EGFRCR SERPLBLD CKD-EPI 2021: 67 ML/MIN/1.73M*2
EOSINOPHIL # BLD MANUAL: 0.16 X10*3/UL (ref 0–0.4)
EOSINOPHIL NFR BLD MANUAL: 1 %
ERYTHROCYTE [DISTWIDTH] IN BLOOD BY AUTOMATED COUNT: 19 % (ref 11.5–14.5)
GLUCOSE SERPL-MCNC: 164 MG/DL (ref 65–99)
HCT VFR BLD AUTO: 28.7 % (ref 41–52)
HGB BLD-MCNC: 9.1 G/DL (ref 13.5–17.5)
IMM GRANULOCYTES # BLD AUTO: 0.22 X10*3/UL (ref 0–0.5)
IMM GRANULOCYTES NFR BLD AUTO: 1.4 % (ref 0–0.9)
LIPASE SERPL-CCNC: 36 U/L (ref 16–63)
LYMPHOCYTES # BLD MANUAL: 1.4 X10*3/UL (ref 0.8–3)
LYMPHOCYTES NFR BLD MANUAL: 9 %
MCH RBC QN AUTO: 30.8 PG (ref 26–34)
MCHC RBC AUTO-ENTMCNC: 31.7 G/DL (ref 32–36)
MCV RBC AUTO: 97 FL (ref 80–100)
MONOCYTES # BLD MANUAL: 1.72 X10*3/UL (ref 0.05–0.8)
MONOCYTES NFR BLD MANUAL: 11 %
MYELOCYTES # BLD MANUAL: 0.16 X10*3/UL
MYELOCYTES NFR BLD MANUAL: 1 %
NEUTS SEG # BLD MANUAL: 12.17 X10*3/UL (ref 1.6–5)
NEUTS SEG NFR BLD MANUAL: 78 %
NRBC BLD-RTO: 0.9 /100 WBCS (ref 0–0)
NT-PROBNP SERPL-MCNC: 5472 PG/ML (ref 0–852)
OVALOCYTES BLD QL SMEAR: ABNORMAL
PLATELET # BLD AUTO: 317 X10*3/UL (ref 150–450)
POLYCHROMASIA BLD QL SMEAR: ABNORMAL
POTASSIUM SERPL-SCNC: 3.8 MMOL/L (ref 3.4–5.1)
PROT SERPL-MCNC: 6.8 G/DL (ref 5.9–7.9)
PROT SERPL-MCNC: 6.8 G/DL (ref 5.9–7.9)
RBC # BLD AUTO: 2.95 X10*6/UL (ref 4.5–5.9)
RBC MORPH BLD: ABNORMAL
SODIUM SERPL-SCNC: 139 MMOL/L (ref 133–145)
TOTAL CELLS COUNTED BLD: 100
TROPONIN T SERPL-MCNC: 368 NG/L
WBC # BLD AUTO: 15.6 X10*3/UL (ref 4.4–11.3)

## 2024-06-22 PROCEDURE — 84484 ASSAY OF TROPONIN QUANT: CPT | Performed by: STUDENT IN AN ORGANIZED HEALTH CARE EDUCATION/TRAINING PROGRAM

## 2024-06-22 PROCEDURE — 71046 X-RAY EXAM CHEST 2 VIEWS: CPT

## 2024-06-22 PROCEDURE — 85027 COMPLETE CBC AUTOMATED: CPT | Performed by: STUDENT IN AN ORGANIZED HEALTH CARE EDUCATION/TRAINING PROGRAM

## 2024-06-22 PROCEDURE — 71046 X-RAY EXAM CHEST 2 VIEWS: CPT | Performed by: STUDENT IN AN ORGANIZED HEALTH CARE EDUCATION/TRAINING PROGRAM

## 2024-06-22 PROCEDURE — 36415 COLL VENOUS BLD VENIPUNCTURE: CPT | Performed by: STUDENT IN AN ORGANIZED HEALTH CARE EDUCATION/TRAINING PROGRAM

## 2024-06-22 PROCEDURE — 99285 EMERGENCY DEPT VISIT HI MDM: CPT

## 2024-06-22 PROCEDURE — 93005 ELECTROCARDIOGRAM TRACING: CPT

## 2024-06-22 PROCEDURE — 85007 BL SMEAR W/DIFF WBC COUNT: CPT | Performed by: STUDENT IN AN ORGANIZED HEALTH CARE EDUCATION/TRAINING PROGRAM

## 2024-06-22 PROCEDURE — 74177 CT ABD & PELVIS W/CONTRAST: CPT

## 2024-06-22 PROCEDURE — 2550000001 HC RX 255 CONTRASTS: Performed by: STUDENT IN AN ORGANIZED HEALTH CARE EDUCATION/TRAINING PROGRAM

## 2024-06-22 PROCEDURE — 74177 CT ABD & PELVIS W/CONTRAST: CPT | Performed by: STUDENT IN AN ORGANIZED HEALTH CARE EDUCATION/TRAINING PROGRAM

## 2024-06-22 PROCEDURE — 83690 ASSAY OF LIPASE: CPT | Performed by: STUDENT IN AN ORGANIZED HEALTH CARE EDUCATION/TRAINING PROGRAM

## 2024-06-22 PROCEDURE — 83880 ASSAY OF NATRIURETIC PEPTIDE: CPT | Performed by: STUDENT IN AN ORGANIZED HEALTH CARE EDUCATION/TRAINING PROGRAM

## 2024-06-22 PROCEDURE — 80076 HEPATIC FUNCTION PANEL: CPT | Performed by: STUDENT IN AN ORGANIZED HEALTH CARE EDUCATION/TRAINING PROGRAM

## 2024-06-22 RX ORDER — FUROSEMIDE 10 MG/ML
40 INJECTION INTRAMUSCULAR; INTRAVENOUS ONCE
Status: COMPLETED | OUTPATIENT
Start: 2024-06-22 | End: 2024-06-23

## 2024-06-22 RX ADMIN — IOHEXOL 75 ML: 350 INJECTION, SOLUTION INTRAVENOUS at 22:35

## 2024-06-22 ASSESSMENT — COLUMBIA-SUICIDE SEVERITY RATING SCALE - C-SSRS
2. HAVE YOU ACTUALLY HAD ANY THOUGHTS OF KILLING YOURSELF?: NO
1. IN THE PAST MONTH, HAVE YOU WISHED YOU WERE DEAD OR WISHED YOU COULD GO TO SLEEP AND NOT WAKE UP?: NO
6. HAVE YOU EVER DONE ANYTHING, STARTED TO DO ANYTHING, OR PREPARED TO DO ANYTHING TO END YOUR LIFE?: NO

## 2024-06-22 ASSESSMENT — PAIN DESCRIPTION - PAIN TYPE: TYPE: ACUTE PAIN

## 2024-06-22 ASSESSMENT — PAIN DESCRIPTION - ONSET: ONSET: ONGOING

## 2024-06-22 ASSESSMENT — PAIN DESCRIPTION - LOCATION: LOCATION: OTHER (COMMENT)

## 2024-06-22 ASSESSMENT — PAIN DESCRIPTION - PROGRESSION: CLINICAL_PROGRESSION: GRADUALLY WORSENING

## 2024-06-22 ASSESSMENT — PAIN SCALES - GENERAL: PAINLEVEL_OUTOF10: 5 - MODERATE PAIN

## 2024-06-22 ASSESSMENT — PAIN - FUNCTIONAL ASSESSMENT: PAIN_FUNCTIONAL_ASSESSMENT: 0-10

## 2024-06-22 ASSESSMENT — PAIN DESCRIPTION - DESCRIPTORS: DESCRIPTORS: ACHING

## 2024-06-22 ASSESSMENT — PAIN DESCRIPTION - FREQUENCY: FREQUENCY: CONSTANT/CONTINUOUS

## 2024-06-23 ENCOUNTER — APPOINTMENT (OUTPATIENT)
Dept: CARDIOLOGY | Facility: HOSPITAL | Age: 84
DRG: 280 | End: 2024-06-23
Payer: MEDICARE

## 2024-06-23 VITALS
HEART RATE: 75 BPM | TEMPERATURE: 97.3 F | DIASTOLIC BLOOD PRESSURE: 75 MMHG | WEIGHT: 167.33 LBS | HEIGHT: 65 IN | BODY MASS INDEX: 27.88 KG/M2 | OXYGEN SATURATION: 96 % | SYSTOLIC BLOOD PRESSURE: 149 MMHG | RESPIRATION RATE: 17 BRPM

## 2024-06-23 PROBLEM — I35.0 MODERATE AORTIC STENOSIS: Status: ACTIVE | Noted: 2024-06-23

## 2024-06-23 PROBLEM — D72.829 LEUKOCYTOSIS: Status: ACTIVE | Noted: 2024-06-23

## 2024-06-23 PROBLEM — I50.9 ACUTE HEART FAILURE, UNSPECIFIED HEART FAILURE TYPE (MULTI): Status: ACTIVE | Noted: 2024-06-23

## 2024-06-23 PROBLEM — R79.89 ELEVATED TROPONIN: Status: ACTIVE | Noted: 2024-06-23

## 2024-06-23 PROBLEM — I50.31 ACUTE DIASTOLIC CONGESTIVE HEART FAILURE (MULTI): Status: ACTIVE | Noted: 2024-06-23

## 2024-06-23 LAB
ALBUMIN SERPL-MCNC: 3.6 G/DL (ref 3.5–5)
ALP BLD-CCNC: 67 U/L (ref 35–125)
ALT SERPL-CCNC: 18 U/L (ref 5–40)
ANION GAP SERPL CALC-SCNC: 13 MMOL/L
AST SERPL-CCNC: 49 U/L (ref 5–40)
ATRIAL RATE: 107 BPM
BASOPHILS # BLD AUTO: 0.03 X10*3/UL (ref 0–0.1)
BASOPHILS NFR BLD AUTO: 0.2 %
BILIRUB SERPL-MCNC: 0.3 MG/DL (ref 0.1–1.2)
BUN SERPL-MCNC: 17 MG/DL (ref 8–25)
CALCIUM SERPL-MCNC: 8.4 MG/DL (ref 8.5–10.4)
CHLORIDE SERPL-SCNC: 103 MMOL/L (ref 97–107)
CO2 SERPL-SCNC: 23 MMOL/L (ref 24–31)
CREAT SERPL-MCNC: 1.1 MG/DL (ref 0.4–1.6)
EGFRCR SERPLBLD CKD-EPI 2021: 67 ML/MIN/1.73M*2
EOSINOPHIL # BLD AUTO: 0.12 X10*3/UL (ref 0–0.4)
EOSINOPHIL NFR BLD AUTO: 0.8 %
ERYTHROCYTE [DISTWIDTH] IN BLOOD BY AUTOMATED COUNT: 19.1 % (ref 11.5–14.5)
GLUCOSE BLD MANUAL STRIP-MCNC: 100 MG/DL (ref 74–99)
GLUCOSE BLD MANUAL STRIP-MCNC: 104 MG/DL (ref 74–99)
GLUCOSE BLD MANUAL STRIP-MCNC: 107 MG/DL (ref 74–99)
GLUCOSE BLD MANUAL STRIP-MCNC: 137 MG/DL (ref 74–99)
GLUCOSE SERPL-MCNC: 115 MG/DL (ref 65–99)
HCT VFR BLD AUTO: 28.6 % (ref 41–52)
HGB BLD-MCNC: 9.1 G/DL (ref 13.5–17.5)
IMM GRANULOCYTES # BLD AUTO: 0.19 X10*3/UL (ref 0–0.5)
IMM GRANULOCYTES NFR BLD AUTO: 1.3 % (ref 0–0.9)
LYMPHOCYTES # BLD AUTO: 1.44 X10*3/UL (ref 0.8–3)
LYMPHOCYTES NFR BLD AUTO: 10 %
MAGNESIUM SERPL-MCNC: 2 MG/DL (ref 1.6–3.1)
MCH RBC QN AUTO: 31.1 PG (ref 26–34)
MCHC RBC AUTO-ENTMCNC: 31.8 G/DL (ref 32–36)
MCV RBC AUTO: 98 FL (ref 80–100)
MONOCYTES # BLD AUTO: 1.35 X10*3/UL (ref 0.05–0.8)
MONOCYTES NFR BLD AUTO: 9.4 %
NEUTROPHILS # BLD AUTO: 11.29 X10*3/UL (ref 1.6–5.5)
NEUTROPHILS NFR BLD AUTO: 78.3 %
NRBC BLD-RTO: 1 /100 WBCS (ref 0–0)
P AXIS: 15 DEGREES
P OFFSET: 190 MS
P ONSET: 154 MS
PLATELET # BLD AUTO: 329 X10*3/UL (ref 150–450)
POTASSIUM SERPL-SCNC: 3.4 MMOL/L (ref 3.4–5.1)
PR INTERVAL: 136 MS
PROT SERPL-MCNC: 6.6 G/DL (ref 5.9–7.9)
Q ONSET: 222 MS
QRS COUNT: 17 BEATS
QRS DURATION: 78 MS
QT INTERVAL: 352 MS
QTC CALCULATION(BAZETT): 469 MS
QTC FREDERICIA: 427 MS
R AXIS: 53 DEGREES
RBC # BLD AUTO: 2.93 X10*6/UL (ref 4.5–5.9)
SODIUM SERPL-SCNC: 139 MMOL/L (ref 133–145)
T AXIS: 111 DEGREES
T OFFSET: 398 MS
TROPONIN T SERPL-MCNC: 381 NG/L
TROPONIN T SERPL-MCNC: 631 NG/L
VENTRICULAR RATE: 107 BPM
WBC # BLD AUTO: 14.4 X10*3/UL (ref 4.4–11.3)

## 2024-06-23 PROCEDURE — 83735 ASSAY OF MAGNESIUM: CPT | Performed by: INTERNAL MEDICINE

## 2024-06-23 PROCEDURE — 80053 COMPREHEN METABOLIC PANEL: CPT | Performed by: INTERNAL MEDICINE

## 2024-06-23 PROCEDURE — 36415 COLL VENOUS BLD VENIPUNCTURE: CPT | Performed by: STUDENT IN AN ORGANIZED HEALTH CARE EDUCATION/TRAINING PROGRAM

## 2024-06-23 PROCEDURE — 2500000001 HC RX 250 WO HCPCS SELF ADMINISTERED DRUGS (ALT 637 FOR MEDICARE OP): Performed by: INTERNAL MEDICINE

## 2024-06-23 PROCEDURE — 84484 ASSAY OF TROPONIN QUANT: CPT | Mod: 91 | Performed by: STUDENT IN AN ORGANIZED HEALTH CARE EDUCATION/TRAINING PROGRAM

## 2024-06-23 PROCEDURE — 2500000004 HC RX 250 GENERAL PHARMACY W/ HCPCS (ALT 636 FOR OP/ED): Performed by: STUDENT IN AN ORGANIZED HEALTH CARE EDUCATION/TRAINING PROGRAM

## 2024-06-23 PROCEDURE — 96374 THER/PROPH/DIAG INJ IV PUSH: CPT | Mod: 59

## 2024-06-23 PROCEDURE — 97161 PT EVAL LOW COMPLEX 20 MIN: CPT | Mod: GP

## 2024-06-23 PROCEDURE — 2500000002 HC RX 250 W HCPCS SELF ADMINISTERED DRUGS (ALT 637 FOR MEDICARE OP, ALT 636 FOR OP/ED): Performed by: INTERNAL MEDICINE

## 2024-06-23 PROCEDURE — 82947 ASSAY GLUCOSE BLOOD QUANT: CPT | Mod: 91

## 2024-06-23 PROCEDURE — 93005 ELECTROCARDIOGRAM TRACING: CPT

## 2024-06-23 PROCEDURE — 99222 1ST HOSP IP/OBS MODERATE 55: CPT | Performed by: INTERNAL MEDICINE

## 2024-06-23 PROCEDURE — 84484 ASSAY OF TROPONIN QUANT: CPT | Performed by: STUDENT IN AN ORGANIZED HEALTH CARE EDUCATION/TRAINING PROGRAM

## 2024-06-23 PROCEDURE — 2500000004 HC RX 250 GENERAL PHARMACY W/ HCPCS (ALT 636 FOR OP/ED): Performed by: INTERNAL MEDICINE

## 2024-06-23 PROCEDURE — 2500000004 HC RX 250 GENERAL PHARMACY W/ HCPCS (ALT 636 FOR OP/ED)

## 2024-06-23 PROCEDURE — 85025 COMPLETE CBC W/AUTO DIFF WBC: CPT | Performed by: INTERNAL MEDICINE

## 2024-06-23 PROCEDURE — 2060000001 HC INTERMEDIATE ICU ROOM DAILY

## 2024-06-23 RX ORDER — ACETAMINOPHEN 650 MG/1
650 SUPPOSITORY RECTAL EVERY 4 HOURS PRN
Status: DISCONTINUED | OUTPATIENT
Start: 2024-06-23 | End: 2024-06-25 | Stop reason: HOSPADM

## 2024-06-23 RX ORDER — GABAPENTIN 600 MG/1
600 TABLET ORAL EVERY 8 HOURS
Status: DISCONTINUED | OUTPATIENT
Start: 2024-06-23 | End: 2024-06-25 | Stop reason: HOSPADM

## 2024-06-23 RX ORDER — ACETAMINOPHEN 325 MG/1
650 TABLET ORAL EVERY 4 HOURS PRN
Status: DISCONTINUED | OUTPATIENT
Start: 2024-06-23 | End: 2024-06-25 | Stop reason: HOSPADM

## 2024-06-23 RX ORDER — FUROSEMIDE 10 MG/ML
40 INJECTION INTRAMUSCULAR; INTRAVENOUS ONCE
Status: DISCONTINUED | OUTPATIENT
Start: 2024-06-23 | End: 2024-06-23

## 2024-06-23 RX ORDER — AMLODIPINE BESYLATE 5 MG/1
5 TABLET ORAL DAILY
Status: DISCONTINUED | OUTPATIENT
Start: 2024-06-23 | End: 2024-06-25 | Stop reason: HOSPADM

## 2024-06-23 RX ORDER — PANTOPRAZOLE SODIUM 40 MG/1
40 TABLET, DELAYED RELEASE ORAL 2 TIMES DAILY
Status: DISCONTINUED | OUTPATIENT
Start: 2024-06-23 | End: 2024-06-25 | Stop reason: HOSPADM

## 2024-06-23 RX ORDER — ACETAMINOPHEN 160 MG/5ML
650 SOLUTION ORAL EVERY 4 HOURS PRN
Status: DISCONTINUED | OUTPATIENT
Start: 2024-06-23 | End: 2024-06-25 | Stop reason: HOSPADM

## 2024-06-23 RX ORDER — ALLOPURINOL 300 MG/1
300 TABLET ORAL DAILY
Status: DISCONTINUED | OUTPATIENT
Start: 2024-06-23 | End: 2024-06-25 | Stop reason: HOSPADM

## 2024-06-23 RX ORDER — GUAIFENESIN 600 MG/1
600 TABLET, EXTENDED RELEASE ORAL EVERY 12 HOURS PRN
Status: DISCONTINUED | OUTPATIENT
Start: 2024-06-23 | End: 2024-06-25 | Stop reason: HOSPADM

## 2024-06-23 RX ORDER — METOPROLOL TARTRATE 50 MG/1
50 TABLET ORAL 2 TIMES DAILY
Status: DISCONTINUED | OUTPATIENT
Start: 2024-06-23 | End: 2024-06-23

## 2024-06-23 RX ORDER — PANTOPRAZOLE SODIUM 40 MG/1
40 TABLET, DELAYED RELEASE ORAL DAILY
Status: DISCONTINUED | OUTPATIENT
Start: 2024-06-23 | End: 2024-06-23

## 2024-06-23 RX ORDER — METOPROLOL SUCCINATE 100 MG/1
100 TABLET, EXTENDED RELEASE ORAL DAILY
Status: DISCONTINUED | OUTPATIENT
Start: 2024-06-23 | End: 2024-06-25 | Stop reason: HOSPADM

## 2024-06-23 RX ORDER — POLYETHYLENE GLYCOL 3350 17 G/17G
17 POWDER, FOR SOLUTION ORAL DAILY PRN
Status: DISCONTINUED | OUTPATIENT
Start: 2024-06-23 | End: 2024-06-25 | Stop reason: HOSPADM

## 2024-06-23 RX ORDER — SUCRALFATE 1 G/1
1 TABLET ORAL 4 TIMES DAILY
Status: DISCONTINUED | OUTPATIENT
Start: 2024-06-23 | End: 2024-06-25 | Stop reason: HOSPADM

## 2024-06-23 RX ORDER — ROSUVASTATIN CALCIUM 20 MG/1
40 TABLET, COATED ORAL NIGHTLY
Status: DISCONTINUED | OUTPATIENT
Start: 2024-06-23 | End: 2024-06-25 | Stop reason: HOSPADM

## 2024-06-23 RX ORDER — ONDANSETRON HYDROCHLORIDE 2 MG/ML
4 INJECTION, SOLUTION INTRAVENOUS EVERY 8 HOURS PRN
Status: DISCONTINUED | OUTPATIENT
Start: 2024-06-23 | End: 2024-06-25 | Stop reason: HOSPADM

## 2024-06-23 RX ORDER — FUROSEMIDE 40 MG/1
40 TABLET ORAL DAILY
Status: DISCONTINUED | OUTPATIENT
Start: 2024-06-23 | End: 2024-06-25 | Stop reason: HOSPADM

## 2024-06-23 RX ORDER — FERROUS SULFATE 325(65) MG
65 TABLET ORAL DAILY
Status: DISCONTINUED | OUTPATIENT
Start: 2024-06-23 | End: 2024-06-25 | Stop reason: HOSPADM

## 2024-06-23 RX ORDER — ALLOPURINOL 300 MG/1
300 TABLET ORAL DAILY
Status: DISCONTINUED | OUTPATIENT
Start: 2024-06-23 | End: 2024-06-23

## 2024-06-23 RX ORDER — ONDANSETRON 4 MG/1
4 TABLET, ORALLY DISINTEGRATING ORAL EVERY 8 HOURS PRN
Status: DISCONTINUED | OUTPATIENT
Start: 2024-06-23 | End: 2024-06-25 | Stop reason: HOSPADM

## 2024-06-23 RX ORDER — GUAIFENESIN/DEXTROMETHORPHAN 100-10MG/5
5 SYRUP ORAL EVERY 4 HOURS PRN
Status: DISCONTINUED | OUTPATIENT
Start: 2024-06-23 | End: 2024-06-25 | Stop reason: HOSPADM

## 2024-06-23 RX ADMIN — GABAPENTIN 600 MG: 600 TABLET, FILM COATED ORAL at 13:53

## 2024-06-23 RX ADMIN — PANTOPRAZOLE SODIUM 40 MG: 40 TABLET, DELAYED RELEASE ORAL at 21:09

## 2024-06-23 RX ADMIN — ALLOPURINOL 300 MG: 300 TABLET ORAL at 10:20

## 2024-06-23 RX ADMIN — FUROSEMIDE 40 MG: 10 INJECTION, SOLUTION INTRAMUSCULAR; INTRAVENOUS at 00:23

## 2024-06-23 RX ADMIN — IRON SUCROSE 200 MG: 20 INJECTION, SOLUTION INTRAVENOUS at 13:53

## 2024-06-23 RX ADMIN — PANTOPRAZOLE SODIUM 40 MG: 40 TABLET, DELAYED RELEASE ORAL at 10:20

## 2024-06-23 RX ADMIN — METOPROLOL SUCCINATE 100 MG: 100 TABLET, EXTENDED RELEASE ORAL at 10:28

## 2024-06-23 RX ADMIN — SUCRALFATE 1 G: 1 TABLET ORAL at 20:33

## 2024-06-23 RX ADMIN — SUCRALFATE 1 G: 1 TABLET ORAL at 18:04

## 2024-06-23 RX ADMIN — FUROSEMIDE 40 MG: 40 TABLET ORAL at 10:28

## 2024-06-23 RX ADMIN — SUCRALFATE 1 G: 1 TABLET ORAL at 06:08

## 2024-06-23 RX ADMIN — METOPROLOL TARTRATE 50 MG: 50 TABLET, FILM COATED ORAL at 10:20

## 2024-06-23 RX ADMIN — ROSUVASTATIN CALCIUM 40 MG: 20 TABLET, COATED ORAL at 20:33

## 2024-06-23 RX ADMIN — SUCRALFATE 1 G: 1 TABLET ORAL at 13:53

## 2024-06-23 RX ADMIN — FUROSEMIDE 40 MG: 10 INJECTION, SOLUTION INTRAMUSCULAR; INTRAVENOUS at 10:20

## 2024-06-23 RX ADMIN — AMLODIPINE BESYLATE 5 MG: 5 TABLET ORAL at 10:29

## 2024-06-23 RX ADMIN — GABAPENTIN 600 MG: 600 TABLET, FILM COATED ORAL at 22:00

## 2024-06-23 RX ADMIN — FERROUS SULFATE TAB 325 MG (65 MG ELEMENTAL FE) 1 TABLET: 325 (65 FE) TAB at 10:20

## 2024-06-23 SDOH — SOCIAL STABILITY: SOCIAL INSECURITY: DO YOU FEEL ANYONE HAS EXPLOITED OR TAKEN ADVANTAGE OF YOU FINANCIALLY OR OF YOUR PERSONAL PROPERTY?: NO

## 2024-06-23 SDOH — SOCIAL STABILITY: SOCIAL INSECURITY: ABUSE: ADULT

## 2024-06-23 SDOH — SOCIAL STABILITY: SOCIAL INSECURITY: DOES ANYONE TRY TO KEEP YOU FROM HAVING/CONTACTING OTHER FRIENDS OR DOING THINGS OUTSIDE YOUR HOME?: NO

## 2024-06-23 SDOH — SOCIAL STABILITY: SOCIAL INSECURITY: ARE THERE ANY APPARENT SIGNS OF INJURIES/BEHAVIORS THAT COULD BE RELATED TO ABUSE/NEGLECT?: NO

## 2024-06-23 SDOH — SOCIAL STABILITY: SOCIAL INSECURITY: HAS ANYONE EVER THREATENED TO HURT YOUR FAMILY OR YOUR PETS?: NO

## 2024-06-23 SDOH — SOCIAL STABILITY: SOCIAL INSECURITY: WERE YOU ABLE TO COMPLETE ALL THE BEHAVIORAL HEALTH SCREENINGS?: YES

## 2024-06-23 SDOH — SOCIAL STABILITY: SOCIAL INSECURITY: ARE YOU OR HAVE YOU BEEN THREATENED OR ABUSED PHYSICALLY, EMOTIONALLY, OR SEXUALLY BY ANYONE?: NO

## 2024-06-23 SDOH — ECONOMIC STABILITY: HOUSING INSECURITY: DO YOU FEEL UNSAFE GOING BACK TO THE PLACE WHERE YOU LIVE?: NO

## 2024-06-23 SDOH — SOCIAL STABILITY: SOCIAL INSECURITY: HAVE YOU HAD ANY THOUGHTS OF HARMING ANYONE ELSE?: NO

## 2024-06-23 SDOH — SOCIAL STABILITY: SOCIAL INSECURITY: DO YOU FEEL UNSAFE GOING BACK TO THE PLACE WHERE YOU ARE LIVING?: NO

## 2024-06-23 SDOH — SOCIAL STABILITY: SOCIAL INSECURITY
ASK PARENT OR GUARDIAN: ARE THERE TIMES WHEN YOU, YOUR CHILD(REN), OR ANY MEMBER OF YOUR HOUSEHOLD FEEL UNSAFE, HARMED, OR THREATENED AROUND PERSONS WITH WHOM YOU KNOW OR LIVE?: NO

## 2024-06-23 SDOH — SOCIAL STABILITY: SOCIAL INSECURITY: HAVE YOU HAD THOUGHTS OF HARMING ANYONE ELSE?: NO

## 2024-06-23 ASSESSMENT — COGNITIVE AND FUNCTIONAL STATUS - GENERAL
HELP NEEDED FOR BATHING: TOTAL
WALKING IN HOSPITAL ROOM: TOTAL
DRESSING REGULAR UPPER BODY CLOTHING: TOTAL
MOVING TO AND FROM BED TO CHAIR: TOTAL
CLIMB 3 TO 5 STEPS WITH RAILING: A LITTLE
DAILY ACTIVITIY SCORE: 6
DRESSING REGULAR LOWER BODY CLOTHING: TOTAL
MOBILITY SCORE: 23
CLIMB 3 TO 5 STEPS WITH RAILING: TOTAL
MOVING FROM LYING ON BACK TO SITTING ON SIDE OF FLAT BED WITH BEDRAILS: TOTAL
EATING MEALS: TOTAL
PERSONAL GROOMING: TOTAL
PATIENT BASELINE BEDBOUND: NO
MOBILITY SCORE: 6
STANDING UP FROM CHAIR USING ARMS: TOTAL
TOILETING: TOTAL
DAILY ACTIVITIY SCORE: 24
TURNING FROM BACK TO SIDE WHILE IN FLAT BAD: TOTAL

## 2024-06-23 ASSESSMENT — LIFESTYLE VARIABLES
HOW OFTEN DO YOU HAVE A DRINK CONTAINING ALCOHOL: NEVER
AUDIT-C TOTAL SCORE: 0
SKIP TO QUESTIONS 9-10: 1
HOW MANY STANDARD DRINKS CONTAINING ALCOHOL DO YOU HAVE ON A TYPICAL DAY: PATIENT DOES NOT DRINK
HOW OFTEN DO YOU HAVE 6 OR MORE DRINKS ON ONE OCCASION: NEVER
AUDIT-C TOTAL SCORE: 0

## 2024-06-23 ASSESSMENT — ACTIVITIES OF DAILY LIVING (ADL)
DRESSING YOURSELF: INDEPENDENT
TOILETING: INDEPENDENT
ADLS_ADDRESSED: YES
ADL_ASSISTANCE: INDEPENDENT
HEARING - RIGHT EAR: HEARING AID
LACK_OF_TRANSPORTATION: NO
WALKS IN HOME: INDEPENDENT
FEEDING YOURSELF: INDEPENDENT
BATHING: INDEPENDENT
ADEQUATE_TO_COMPLETE_ADL: YES
HEARING - LEFT EAR: HEARING AID
GROOMING: INDEPENDENT
PATIENT'S MEMORY ADEQUATE TO SAFELY COMPLETE DAILY ACTIVITIES?: YES
JUDGMENT_ADEQUATE_SAFELY_COMPLETE_DAILY_ACTIVITIES: YES

## 2024-06-23 ASSESSMENT — PAIN - FUNCTIONAL ASSESSMENT
PAIN_FUNCTIONAL_ASSESSMENT: 0-10

## 2024-06-23 ASSESSMENT — ENCOUNTER SYMPTOMS
VOMITING: 0
BLOOD IN STOOL: 0
ABDOMINAL PAIN: 0
CONSTIPATION: 0
DIARRHEA: 0
NAUSEA: 0
SHORTNESS OF BREATH: 1
FATIGUE: 1

## 2024-06-23 ASSESSMENT — PAIN SCALES - GENERAL
PAINLEVEL_OUTOF10: 0 - NO PAIN
PAINLEVEL_OUTOF10: 5 - MODERATE PAIN

## 2024-06-23 ASSESSMENT — PATIENT HEALTH QUESTIONNAIRE - PHQ9
2. FEELING DOWN, DEPRESSED OR HOPELESS: NOT AT ALL
SUM OF ALL RESPONSES TO PHQ9 QUESTIONS 1 & 2: 0
1. LITTLE INTEREST OR PLEASURE IN DOING THINGS: NOT AT ALL

## 2024-06-23 ASSESSMENT — PAIN DESCRIPTION - DESCRIPTORS: DESCRIPTORS: ACHING

## 2024-06-23 NOTE — ED PROVIDER NOTES
HPI   Chief Complaint   Patient presents with    Shortness of Breath     Pt has been getting progressively more short of breath over the last 3 days. Pt had his medications changed at [facility] Tuesday.       Patient presents with shortness of breath.  He was recently admitted to the hospital with concern for peptic ulcer disease.  He presents with his son she reports that his medications were changed and since then, he has had progressive difficulty breathing.  He reports that his shortness of breath is worse when he is up and moving around.  He denies any swelling in the legs.  He has been having some pain on the right side of his abdomen as well.                          No data recorded                   Patient History   Past Medical History:   Diagnosis Date    Atherosclerotic heart disease of native coronary artery without angina pectoris 10/25/2022    Coronary atherosclerosis    Disorder of prostate, unspecified     Prostate disorder    Essential (primary) hypertension 2020    Hypertension    Hyperlipidemia, unspecified 10/25/2022    Hyperlipidemia    Occlusion and stenosis of unspecified carotid artery 10/25/2022    Carotid artery calcification    Personal history of other diseases of urinary system     H/O bladder problems     Past Surgical History:   Procedure Laterality Date    CORONARY ARTERY BYPASS GRAFT  2015    CABG    OTHER SURGICAL HISTORY  2014    PTA Carotid Artery    OTHER SURGICAL HISTORY  2016    Previous Stent Placement     Family History   Problem Relation Name Age of Onset    Coronary artery disease Brother       Social History     Tobacco Use    Smoking status: Former     Current packs/day: 0.00     Average packs/day: 1 pack/day for 41.0 years (41.0 ttl pk-yrs)     Types: Cigarettes     Start date:      Quit date:      Years since quittin.4     Passive exposure: Past    Smokeless tobacco: Never   Vaping Use    Vaping status: Never Used   Substance Use  Topics    Alcohol use: Never     Comment: quit drinking many years ago    Drug use: Never       Physical Exam   ED Triage Vitals [06/22/24 2149]   Temperature Heart Rate Respirations BP   36.8 °C (98.2 °F) (!) 105 16 158/84      Pulse Ox Temp Source Heart Rate Source Patient Position   (!) 92 % Temporal Monitor Sitting      BP Location FiO2 (%)     Right arm --       Physical Exam  HENT:      Head: Normocephalic.   Cardiovascular:      Rate and Rhythm: Tachycardia present.      Comments: Systolic murmur  Pulmonary:      Comments: Faint end expiratory wheezes, otherwise unremarkable  Musculoskeletal:      Comments: Trace pretibial edema bilateral lower extremities   Neurological:      General: No focal deficit present.      Mental Status: He is alert.         ED Course & MDM   ED Course as of 06/23/24 0525   Sat Jun 22, 2024 2224 EKG interpretation: Sinus tachycardia, rate 102.  Normal axis.  Less than 1 mm of ST depression seen diffusely. [ML]      ED Course User Index  [ML] Franklyn Hamilton MD         Diagnoses as of 06/23/24 0525   Shortness of breath   Right flank pain   Acute heart failure, unspecified heart failure type (Multi)       Medical Decision Making  Patient presents with shortness of breath.  BNP and troponin both found to be significantly elevated.  Symptoms felt to be secondary to heart failure.  X-ray consistent with vascular congestion and pleural effusion.  Patient was given 40 mg of IV Lasix.  I attempted to discuss patient's case with cardiology, Dr. Altman, but he was unable to be reached.  I was able to discuss patient's case with cardiology, Dr. Chavarria, who does not feel that patient needs emergent catheterization at this time and also does not recommend anticoagulation given his previous bleeding from stomach ulcer.  Patient was given 40 mg of IV Lasix and accepted to hospitalist service for further management.  No EKG changes or chest pain to suggest acute coronary syndrome, however troponin  was noted to be elevated.  Given stable trend, I feel this likely represents heart failure and is not indicative of acute coronary syndrome or PE.  Patient accepted to hospitalist service for further management.  Parts of this chart were completed with dictation software, please excuse any errors in transcription.        Procedure  Procedures     Franklyn Hamilton MD  06/23/24 1608

## 2024-06-23 NOTE — PROGRESS NOTES
Physical Therapy    Physical Therapy Evaluation & Treatment    Patient Name: Herminio Devlin  MRN: [id number]  Today's Date: 6/23/2024   Time Calculation  Start Time: 1040  Stop Time: 1110  Time Calculation (min): 30 min    Assessment/Plan   PT Assessment  PT Assessment Results: Decreased endurance, Pain  Rehab Prognosis: Excellent  Evaluation/Treatment Tolerance: Patient limited by fatigue  Medical Staff Made Aware: Yes  Strengths: Attitude of self  End of Session Communication: Bedside nurse  Assessment Comment: 85 yo male admitted with dyspnea. Pt displays mild endurance deficits and c/o R sided trunk/abdominal pain. Recommend inpatient to improve endurance and maintain functional mobility to safely discharge home.  End of Session Patient Position: Up in chair   IP OR SWING BED PT PLAN  Inpatient or Swing Bed: Inpatient  PT Plan  Treatment/Interventions: Gait training, Stair training, Endurance training  PT Frequency: 2 times per week  PT Discharge Recommendations: No PT needed after discharge  Equipment Recommended upon Discharge:  (NA)  PT Recommended Transfer Status: Independent  PT - OK to Discharge: Yes      Subjective     General Visit Information:  General  Reason for Referral: Impaired mobility  Referred By: Dr. Altman  Past Medical History Relevant to Rehab: carotid artery stenosis s/p right CEA, PAD s/p kissing stent technique in the iliacs and into the distal abdominal aorta, hyperlipidemia, hypertension and CAD s/p CABG x 4 with heart catheterization 3/10/2021  Family/Caregiver Present: Yes (son present initially then he left the room during PT evaluation.)  Prior to Session Communication: Bedside nurse  Home Living:  Home Living  Type of Home: House  Lives With: Spouse  Home Layout: Two level, Laundry main level, Full bath main level, Able to live on main level with bedroom/bathroom, Stairs to alternate level with rails  Alternate Level Stairs-Rails: Right  Alternate Level Stairs-Number of Steps:  12  Home Access: Stairs to enter with rails  Entrance Stairs-Rails: Both  Entrance Stairs-Number of Steps: 3  Bathroom Shower/Tub: Walk-in shower  Bathroom Toilet: Standard  Bathroom Equipment: Grab bars in shower, Shower chair with back  Bathroom Accessibility: main floor  Prior Level of Function:  Prior Function Per Pt/Caregiver Report  Level of [place]: Independent with homemaking with ambulation, Independent with ADLs and functional transfers  Receives Help From: Family  ADL Assistance: Independent  Homemaking Assistance: Independent  Ambulatory Assistance: Independent  Vocational: Retired  Precautions:  Precautions  Medical Precautions: Fall precautions  Vital Signs:  Vital Signs  Heart Rate: 84  Heart Rate Source: Monitor  SpO2: 99 % (room air after ambulation)    Objective   Pain:  Pain Assessment  Pain Assessment: 0-10  0-10 (Numeric) Pain Score: 5 - Moderate pain  Pain Type: Acute pain  Pain Location:  (R trunk/flank)  Pain Orientation: Right  Pain Descriptors: Aching  Pain Frequency: Constant/continuous  Pain Onset: Ongoing  Patient's Stated Pain Goal: No pain  Cognition:  Cognition  Overall Cognitive Status: Within Functional Limits    General Assessments:                  Activity Tolerance  Early Mobility/Exercise Safety Screen: Proceed with mobilization - No exclusion criteria met    Sensation  Light Touch: No apparent deficits    Strength  Strength Comments: WFL  Strength  Strength Comments: WFL           Coordination  Movements are Fluid and Coordinated: Yes    Postural Control  Postural Control: Within Functional Limits    Static Sitting Balance  Static Sitting-Balance Support: No upper extremity supported, Feet supported  Static Sitting-Level of Assistance: Independent  Static Sitting-Comment/Number of Minutes: 5  Dynamic Sitting Balance  Dynamic Sitting-Balance Support: No upper extremity supported, Feet supported (one leg support)  Dynamic Sitting-Balance: Forward lean (donning  socks)    Static Standing Balance  Static Standing-Balance Support: No upper extremity supported  Static Standing-Level of Assistance: Independent  Dynamic Standing Balance  Dynamic Standing-Balance Support: No upper extremity supported  Dynamic Standing-Balance: Forward lean  Functional Assessments:  ADL  ADL's Addressed: Yes  LE Dressing Assistance: Independent  LE Dressing Deficit: Don/doff L sock, Don/doff R sock    Bed Mobility  Bed Mobility: Yes  Bed Mobility 1  Bed Mobility 1: Supine to sitting  Level of Assistance 1: Independent  Bed Mobility 2  Bed Mobility  2: Sitting to supine  Level of Assistance 2: Independent  Bed Mobility 3  Bed Mobility 3: Scooting  Level of Assistance 3: Independent    Transfers  Transfer: Yes  Transfer 1  Transfer From 1: Sit to, Bed to  Transfer to 1: Stand  Technique 1: Sit to stand  Transfer Level of Assistance 1: Independent  Transfers 2  Transfer From 2: Stand to  Transfer to 2: Sit  Technique 2: Stand to sit  Transfer Level of Assistance 2: Independent  Transfers 3  Transfer From 3: Bed to  Transfer to 3: Chair with arms  Technique 3: Stand to sit  Transfer Level of Assistance 3: Independent    Ambulation/Gait Training  Ambulation/Gait Training Performed: Yes  Ambulation/Gait Training 1  Surface 1: Level tile  Device 1: No device  Assistance 1: Independent  Quality of Gait 1:  (WFL)  Comments/Distance (ft) 1: 60 feet in hospital room. Denies SOB/chest pain. SpO2 99% on room air.  Extremity/Trunk Assessments:  RUE   RUE : Within Functional Limits  LUE   LUE: Within Functional Limits  RLE   RLE : Within Functional Limits  LLE   LLE : Within Functional Limits    Outcome Measures:    5x sit to stand: WFL     Encounter Problems       Encounter Problems (Active)       PT Problem       PT Goal 1       Start:  06/23/24    Expected End:  07/07/24       Pt will ambulate 400' independent assist with no device to promote safe home mobility          PT Goal 2       Start:  06/23/24     Expected End:  07/07/24       Pt will ascend/descend 3 stairs with rail(s) on R/L and modified independent assist to promote safe entry and exit in home environment            PT Goal 3       Start:  06/23/24    Expected End:  07/07/24       Pt will ascend/descend 12 stairs with rail(s) on R  with modified independent assist and least restrictive device to promote safe navigation at home between floors          PT Goal 4       Start:  06/23/24    Expected End:  07/07/24       Pt will correctly identify and demonstrate safe mobility techniques to reduce their risks for falls during their acute care stay                  Education Documentation  Mobility Training, taught by Mika Cabrera, PT at 6/23/2024 11:39 AM.  Learner: Patient  Readiness: Eager  Method: Explanation  Response: Verbalizes Understanding    Education Comments  No comments found.

## 2024-06-23 NOTE — H&P
History Of Present Illness        Herminio Devlin is a 84 y.o. male presenting with Dyspnea.      Patient has been progressively getting more short of breath over the last 3 days.      Patient presents with shortness of breath. He was recently admitted to the hospital with concern for peptic ulcer disease. He presents with his son she reports that his medications were changed and since then, he has had progressive difficulty breathing. He reports that his shortness of breath is worse when he is up and moving around. He denies any swelling in the legs. He has been having some pain on the right side of his abdomen as well.     Essentially the patient first presented to [facility] on Mile 15, 2024 with chief complaint of shortness of breath.  At that time he was admitted with suspected NSTEMI and initially started on heparin drip.  Early after he started having small amount of hematemesis in the ED. Case was rediscussed with cardiology and the patient's heparin drip was discontinued.  Patient was given protamine.  Patient was actually transferred to [facility] for EGD by gastroenterology.  Prior to that it was noted that the patient was taking Aleve daily as well as DAPT testing of aspirin and Plavix.  He was given PPI IV twice daily transferred to [facility].    The patient underwent EGD on June 17, 2024.  This showed healed prepyloric ulcer shallow, recommend repeat EGD in 6 weeks.  Recommendation was to repeat the EGD in 6 weeks.  He was discharged on PPI and Carafate.  Does not appear that he was discharged on diuretic therapy at that time.      The patient has a past medical history of carotid artery stenosis s/p right CEA, PAD s/p kissing stent technique in the iliacs and into the distal abdominal aorta, hyperlipidemia, hypertension and CAD s/p CABG x 4 with heart catheterization 3/10/2021 showing stable native vessel CAD with widely patent LIMA-LAD and SVG-RCA and totally  occluded SVG-OM1 and totally occluded SVG-OM2 with mild aortic stenosis and normal LV systolic function. Echocardiogram 3/2023 showed an EF of 60-65% with mild TR, mild MR and mild aortic stenosis.       It was noted that the patient followed up with Dr. Yeboah on August 14, 2023.  At that time he appeared to be on Repatha and isosorbide mononitrate.  Is on lovastatin 40 mg as well.  I did not see any diuretic therapy.    Today the patient's ED diagnostic workup was noted for leukocytosis of 15.6.  H&H is low normal at 9.1/28.7.  His hemoglobin on June 18, 2024 was 8.4/26.4.  Today his platelet count is within normal range.  His blood chemistry is essentially unremarkable.  Elevated glucose 164.  AST elevated 42.  His proBNP was 5,472.  Patient's first troponin was 368 followed by 381.  Chest x-ray was read as small bilateral pleural effusions and mild pulmonary venous congestion.      A CT abdomen pelvis with IV contrast was obtained.  Results were noted for the following:      IMPRESSION:    1.  No acute abnormality in the abdomen/pelvis. 3.1 cm gas-filled  outpouching from the medial aspect of the 2nd portion of the duodenum  likely representing a diverticulum, however correlation with prior  endoscopy is recommended. Atherosclerotic disease with mild bilateral  renal cortical thinning and atherosclerotic stenosis at the origin of  the bilateral renal arteries. Chronic appearing focal short segment  occlusion of the left common femoral artery with distal  reconstitution and at least moderate focal calcified stenosis of the  right common femoral artery.  2. Small bilateral pleural effusions and interstitial lung edema.        Off note a 2D echocardiogram from February 13, 2023 was noted for the following:    CONCLUSIONS:    1. Left ventricular systolic function is normal with a 60-65% estimated ejection fraction.  2. Abnormal septal motion consistent with post-operative status.  3. Spectral Doppler shows an  impaired relaxation pattern of left ventricular diastolic filling.  4. Trace to mild mitral valve regurgitation.  5. Mild tricuspid regurgitation visualized.  6. Mild aortic valve stenosis.  7. The aortic valve appears tricuspid with restriction.  8. There is moderate aortic valve cusp calcification.  9. The peak instantaneous gradient of the aortic valve is 21.3 mmHg.  10. There is plaque visualized in the ascending aorta.        Past Medical History      Past Medical History:   Diagnosis Date    Atherosclerotic heart disease of native coronary artery without angina pectoris 10/25/2022    Coronary atherosclerosis    Disorder of prostate, unspecified     Prostate disorder    Essential (primary) hypertension 12/02/2020    Hypertension    Hyperlipidemia, unspecified 10/25/2022    Hyperlipidemia    Occlusion and stenosis of unspecified carotid artery 10/25/2022    Carotid artery calcification    Personal history of other diseases of urinary system     H/O bladder problems         Surgical History        Past Surgical History:   Procedure Laterality Date    CORONARY ARTERY BYPASS GRAFT  11/09/2015    CABG    OTHER SURGICAL HISTORY  02/24/2014    PTA Carotid Artery    OTHER SURGICAL HISTORY  02/01/2016    Previous Stent Placement          Social History    He reports that he quit smoking about 30 years ago. His smoking use included cigarettes. He started smoking about 71 years ago. He has a 41 pack-year smoking history. He has been exposed to tobacco smoke. He has never used smokeless tobacco. He reports that he does not drink alcohol and does not use drugs.      Family History      Family History   Problem Relation Name Age of Onset    Coronary artery disease Brother            Allergies      Patient has no known allergies.      Review of Systems    14-point ROS otherwise negative, as per HPI/Interval History.    General: No change in weight. No weakenss. No Fevers/Chills/Night Sweats   Skin: No skin/hair/nail changes.  "No rashes or sores.  Head:  No trauma. No Headache/nasuea/vomitting.   Eyes: No visual changes. No tearing. No itching.   Ears: No hearing loss. No tinnitus. No vertigo. No discharge.  Nose, Sinuses: No rhinorrhea, No nasal congestion. No epistaxis.  Mouth, Throat, Neck: No bleeding gums, hoarseness, sore throat or swollen neck  Cardiac: No palpitations. No MANE. No PND. No Orthopnea.   Respiratory: Yes Shortness of Breath. No wheezing. No cough. No hemoptysis.   GI: No nausea/vomiting. No indigestion. No diarrhea. No constipation.   Extremities: No numbness or tingling. No paresthesias.   Urinary: No change in urinary frequency. No change in hesitancy. No hematuria. No incontinence.         Physical Exam        Constitutional:  Pleasant  Eyes: PERRL, EOMI,   ENMT: mucous membranes moist  Head/Neck: Neck supple, No JVD,   Respiratory/Thorax: Fine Crackles B/L  Cardiovascular: Regular, rate and rhythm, Holosystolic Murmur  Gastrointestinal: Soft, non-distended, +BS.  Musculoskeletal: ROM intact, no joint swelling, normal strength  Extremities: peripheral pulses intact; no edema  Neurological: Alert and Oriented x 3; no focal deficits; gross motor and sensation intact; CN II-XII intact. No asterixis.  Psychological: Appropriate mood and behavior  Skin: No lesions, No rashes.         Last Recorded Vitals  Blood pressure 134/56, pulse 97, temperature 36.8 °C (98.2 °F), temperature source Temporal, resp. rate 17, height 1.651 m (5' 5\"), weight 75.9 kg (167 lb 5.3 oz), SpO2 95%.    Relevant Results    Lab Results   Component Value Date    WBC 15.6 (H) 06/22/2024    HGB 9.1 (L) 06/22/2024    HCT 28.7 (L) 06/22/2024    MCV 97 06/22/2024     06/22/2024       Lab Results   Component Value Date    GLUCOSE 164 (H) 06/22/2024    CALCIUM 8.5 06/22/2024     06/22/2024    K 3.8 06/22/2024    CO2 24 06/22/2024     06/22/2024    BUN 18 06/22/2024    CREATININE 1.10 06/22/2024       Lab Results   Component Value Date "    HGBA1C 5.7 (H) 05/13/2024         No CT head results found for the past 12 months      Scheduled medications  allopurinol, 300 mg, oral, Daily  ferrous sulfate (325 mg ferrous sulfate), 65 mg of iron, oral, Daily  furosemide, 40 mg, intravenous, Once  metoprolol tartrate, 50 mg, oral, BID  pantoprazole, 40 mg, oral, Daily  sucralfate, 1 g, oral, 4x daily      Continuous medications     PRN medications  PRN medications: acetaminophen **OR** acetaminophen **OR** acetaminophen, benzocaine-menthol, dextromethorphan-guaifenesin, guaiFENesin, ondansetron **OR** ondansetron, polyethylene glycol        Assessment/Plan   Principal Problem:    Acute heart failure, unspecified heart failure type (Multi)  Active Problems:    CAD in native artery    Peripheral neuropathy    Type 2 diabetes mellitus without complications (Multi)    GI bleed due to NSAIDs    Symptomatic anemia    Acute diastolic congestive heart failure (Multi)    Moderate aortic stenosis    Leukocytosis    Elevated troponin        Herminio Devlin is a 84 y.o. male presenting with Dyspnea.  Patient admitted for further evaluation and management.        Dyspnea 2/2 Acute Decompensated Heart Failure      Admit patient to Telemetry Services.   Will administer  Lasix 40 mg IVP x 1  Defer repeat TTE to evaluate for most recent LVEF and to asses for any valvular deformities, or regional wall motion defects.  Low Sodium Diet  Fluid Restriction < 1.5 Liter/day  Daily Weights  Strict I's + O's  Cardiac Diet   EKG shows Sinus Tachycardia 102 BPM  LAE. Nonspecific ST Abnormality  Qtc 508 ms        Moderate Aortic Valve Stenosis    Continue with IV diuresis      Type 2 Demand Ischemia versus NSTEMI    Defer further management to Dr. Chavarria  EKG reviewed  Holding DAPT secondary to recent GI bleed as per recommendation from Dr. Chavarria to ED      T2DM    Insulin sliding scale  POCT every 6 hourly      CAD s/p CABG     Heart catheterization 3/10/2021 showing stable native  vessel CAD with widely patent LIMA-LAD and SVG-RCA and totally occluded SVG-OM1 and totally occluded SVG-OM2.  Patient previously on lovastatin 40 mg.  Does not appear to be formulary here  Defer new statin therapy to Dr. Chavarria      Recent GI Bleed     The patient underwent EGD on June 17, 2024.  This showed healed prepyloric ulcer shallow, recommend repeat EGD in 6 weeks.  Recommendation was to repeat the EGD in 6 weeks.  He was discharged on PPI and Carafate.  Does not appear that he was discharged on diuretic therapy at that time.      H/o Carotid Artery Stenosis    s/p right CEA      H/o PAD    S/p kissing stent technique in the iliacs and into the distal abdominal aorta  Patient previously on lovastatin 40 mg.  Does not appear to be formulary here  Defer new statin therapy to Dr. Chavarria      Incidental 3.1 cm gas-filled outpouching from the medial aspect of the 2nd portion of the duodenum likely representing a diverticulum    Defer to GI Service       GI + DVT prophylaxis    Continue oral PPI PO BID for 8 weeks per GI recs  Sequential compression devices bilaterally              This Dictation was Transcribed using a Nuance Dragon Voice Recognition System Device (with Compatible Computer + Software) and as such may contain Grammatical Errors and Unintentional Typing Misprints.      I spent 32 minutes in the professional and overall care of this patient.      Dave Altman MD

## 2024-06-23 NOTE — CARE PLAN
Problem: Respiratory  Goal: Minimal/no exertional discomfort or dyspnea this shift  Outcome: Progressing  Goal: No signs of respiratory distress (eg. Use of accessory muscles. Peds grunting)  Outcome: Progressing  Goal: Patent airway maintained this shift  Outcome: Progressing  Goal: Verbalize decreased shortness of breath this shift  Outcome: Progressing  Goal: Wean oxygen to maintain O2 saturation per order/standard this shift  Outcome: Progressing   The patient's goals for the shift include      The clinical goals for the shift include Improve breathing    Over the shift, the patient did not make progress toward the following goals. Barriers to progression include . Recommendations to address these barriers include .

## 2024-06-23 NOTE — CONSULTS
Consults    Reason For Consult  Abnormal CT    History Of Present Illness  Herminio Devlin is a 84 y.o. male presenting with shortness of breath, CHF. CT showed 3.1 cm outpouching 2nd portion duodenum. Patient denies current epigastic pain, nausea, vomiting. Recent admission 6/17 with melena and anemia. EGD per Dr Smith at that time showed GEJ ulcer. Patient denies active black or bloody stools. HH improved since prior discharge      Past Medical History  He has a past medical history of Atherosclerotic heart disease of native coronary artery without angina pectoris (10/25/2022), Disorder of prostate, unspecified, Essential (primary) hypertension (12/02/2020), Hyperlipidemia, unspecified (10/25/2022), Occlusion and stenosis of unspecified carotid artery (10/25/2022), and Personal history of other diseases of urinary system.    Surgical History  He has a past surgical history that includes Other surgical history (02/24/2014); Coronary artery bypass graft (11/09/2015); and Other surgical history (02/01/2016).     Social History  He reports that he quit smoking about 30 years ago. His smoking use included cigarettes. He started smoking about 71 years ago. He has a 41 pack-year smoking history. He has been exposed to tobacco smoke. He has never used smokeless tobacco. He reports that he does not drink alcohol and does not use drugs.    Family History  Family History   Problem Relation Name Age of Onset    Coronary artery disease Brother          Allergies  Patient has no known allergies.    Review of Systems   Constitutional:  Positive for fatigue.   Respiratory:  Positive for shortness of breath.    Gastrointestinal:  Negative for abdominal pain, blood in stool, constipation, diarrhea, nausea and vomiting.        Physical Exam  Vitals reviewed.   Constitutional:       General: He is awake.      Appearance: Normal appearance.   HENT:      Head: Normocephalic and atraumatic.      Mouth/Throat:      Mouth: Mucous  "membranes are moist.   Cardiovascular:      Rate and Rhythm: Normal rate and regular rhythm.   Pulmonary:      Effort: Pulmonary effort is normal.      Breath sounds: Normal breath sounds.   Abdominal:      General: There is no distension.      Palpations: Abdomen is soft.      Tenderness: There is no abdominal tenderness. There is no guarding.   Musculoskeletal:      Cervical back: Normal range of motion and neck supple.   Skin:     General: Skin is warm and dry.   Neurological:      General: No focal deficit present.      Mental Status: He is alert and oriented to person, place, and time. Mental status is at baseline.   Psychiatric:         Attention and Perception: Attention and perception normal.         Mood and Affect: Mood normal.         Behavior: Behavior normal.          Last Recorded Vitals  Blood pressure 137/60, pulse 94, temperature 36.1 °C (97 °F), temperature source Temporal, resp. rate 17, height 1.651 m (5' 5\"), weight 75.9 kg (167 lb 5.3 oz), SpO2 96%.    Relevant Results  Results for orders placed or performed during the hospital encounter of 06/22/24 (from the past 24 hour(s))   CBC and Auto Differential   Result Value Ref Range    WBC 15.6 (H) 4.4 - 11.3 x10*3/uL    nRBC 0.9 (H) 0.0 - 0.0 /100 WBCs    RBC 2.95 (L) 4.50 - 5.90 x10*6/uL    Hemoglobin 9.1 (L) 13.5 - 17.5 g/dL    Hematocrit 28.7 (L) 41.0 - 52.0 %    MCV 97 80 - 100 fL    MCH 30.8 26.0 - 34.0 pg    MCHC 31.7 (L) 32.0 - 36.0 g/dL    RDW 19.0 (H) 11.5 - 14.5 %    Platelets 317 150 - 450 x10*3/uL    Immature Granulocytes %, Automated 1.4 (H) 0.0 - 0.9 %    Immature Granulocytes Absolute, Automated 0.22 0.00 - 0.50 x10*3/uL   NT-PROBNP   Result Value Ref Range    PROBNP 5,472 (H) 0 - 852 pg/mL   Comprehensive Metabolic Panel   Result Value Ref Range    Glucose 164 (H) 65 - 99 mg/dL    Sodium 139 133 - 145 mmol/L    Potassium 3.8 3.4 - 5.1 mmol/L    Chloride 103 97 - 107 mmol/L    Bicarbonate 24 24 - 31 mmol/L    Urea Nitrogen 18 8 - 25 " mg/dL    Creatinine 1.10 0.40 - 1.60 mg/dL    eGFR 67 >60 mL/min/1.73m*2    Calcium 8.5 8.5 - 10.4 mg/dL    Albumin 4.0 3.5 - 5.0 g/dL    Alkaline Phosphatase 76 35 - 125 U/L    Total Protein 6.8 5.9 - 7.9 g/dL    AST 42 (H) 5 - 40 U/L    Bilirubin, Total 0.3 0.1 - 1.2 mg/dL    ALT 19 5 - 40 U/L    Anion Gap 12 <=19 mmol/L   Serial Troponin, Initial ([facility])   Result Value Ref Range    Troponin T, High Sensitivity 368 (HH) <=14 ng/L   Lipase   Result Value Ref Range    Lipase 36 16 - 63 U/L   Hepatic function panel   Result Value Ref Range    AST 42 (H) 5 - 40 U/L    ALT 19 5 - 40 U/L    Alkaline Phosphatase 76 35 - 125 U/L    Bilirubin, Total 0.3 0.1 - 1.2 mg/dL    Bilirubin, Direct <0.2 0.0 - 0.2 mg/dL    Total Protein 6.8 5.9 - 7.9 g/dL    Albumin 4.0 3.5 - 5.0 g/dL   Manual Differential   Result Value Ref Range    Neutrophils %, Manual 78.0 40.0 - 80.0 %    Lymphocytes %, Manual 9.0 13.0 - 44.0 %    Monocytes %, Manual 11.0 2.0 - 10.0 %    Eosinophils %, Manual 1.0 0.0 - 6.0 %    Basophils %, Manual 0.0 0.0 - 2.0 %    Myelocytes %, Manual 1.0 0.0 - 0.0 %    Seg Neutrophils Absolute, Manual 12.17 (H) 1.60 - 5.00 x10*3/uL    Lymphocytes Absolute, Manual 1.40 0.80 - 3.00 x10*3/uL    Monocytes Absolute, Manual 1.72 (H) 0.05 - 0.80 x10*3/uL    Eosinophils Absolute, Manual 0.16 0.00 - 0.40 x10*3/uL    Basophils Absolute, Manual 0.00 0.00 - 0.10 x10*3/uL    Myelocytes Absolute, Manual 0.16 0.00 - 0.00 x10*3/uL    Total Cells Counted 100     RBC Morphology See Below     Polychromasia Marked     Ovalocytes Few    Serial Troponin, 2 Hour ([facility])   Result Value Ref Range    Troponin T, High Sensitivity 381 () <=14 ng/L   Serial Troponin, 6 Hour ([facility])   Result Value Ref Range    Troponin T, High Sensitivity 631 () <=14 ng/L   CBC and Auto Differential   Result Value Ref Range    WBC 14.4 (H) 4.4 - 11.3 x10*3/uL    nRBC 1.0 (H) 0.0 - 0.0 /100 WBCs    RBC 2.93 (L) 4.50 - 5.90 x10*6/uL    Hemoglobin 9.1 (L) 13.5 - 17.5 g/dL     Hematocrit 28.6 (L) 41.0 - 52.0 %    MCV 98 80 - 100 fL    MCH 31.1 26.0 - 34.0 pg    MCHC 31.8 (L) 32.0 - 36.0 g/dL    RDW 19.1 (H) 11.5 - 14.5 %    Platelets 329 150 - 450 x10*3/uL    Neutrophils % 78.3 40.0 - 80.0 %    Immature Granulocytes %, Automated 1.3 (H) 0.0 - 0.9 %    Lymphocytes % 10.0 13.0 - 44.0 %    Monocytes % 9.4 2.0 - 10.0 %    Eosinophils % 0.8 0.0 - 6.0 %    Basophils % 0.2 0.0 - 2.0 %    Neutrophils Absolute 11.29 (H) 1.60 - 5.50 x10*3/uL    Immature Granulocytes Absolute, Automated 0.19 0.00 - 0.50 x10*3/uL    Lymphocytes Absolute 1.44 0.80 - 3.00 x10*3/uL    Monocytes Absolute 1.35 (H) 0.05 - 0.80 x10*3/uL    Eosinophils Absolute 0.12 0.00 - 0.40 x10*3/uL    Basophils Absolute 0.03 0.00 - 0.10 x10*3/uL   Comprehensive metabolic panel   Result Value Ref Range    Glucose 115 (H) 65 - 99 mg/dL    Sodium 139 133 - 145 mmol/L    Potassium 3.4 3.4 - 5.1 mmol/L    Chloride 103 97 - 107 mmol/L    Bicarbonate 23 (L) 24 - 31 mmol/L    Urea Nitrogen 17 8 - 25 mg/dL    Creatinine 1.10 0.40 - 1.60 mg/dL    eGFR 67 >60 mL/min/1.73m*2    Calcium 8.4 (L) 8.5 - 10.4 mg/dL    Albumin 3.6 3.5 - 5.0 g/dL    Alkaline Phosphatase 67 35 - 125 U/L    Total Protein 6.6 5.9 - 7.9 g/dL    AST 49 (H) 5 - 40 U/L    Bilirubin, Total 0.3 0.1 - 1.2 mg/dL    ALT 18 5 - 40 U/L    Anion Gap 13 <=19 mmol/L   Magnesium   Result Value Ref Range    Magnesium 2.00 1.60 - 3.10 mg/dL   POCT GLUCOSE   Result Value Ref Range    POCT Glucose 100 (H) 74 - 99 mg/dL     CT abdomen pelvis w IV contrast    Result Date: 6/22/2024  Interpreted By:  Farhan Srivastava, STUDY: CT ABDOMEN PELVIS W IV CONTRAST;  6/22/2024 10:49 pm   INDICATION: Signs/Symptoms:R abd/flank pain.   COMPARISON: None.   ACCESSION NUMBER(S): [id number]   ORDERING CLINICIAN: DIA OWENS   TECHNIQUE: CT of the abdomen and pelvis was performed.  Standard contiguous axial images were obtained through the abdomen and pelvis. Coronal and sagittal reconstructions were  performed.   75 ml of contrast Omnipaque 350 were administered intravenously without immediate complication.   FINDINGS: LOWER CHEST: Small bilateral pleural effusions. Interseptal and bronchial thickening suggestive of mild pulmonary venous congestion. Partially visualized sternotomy changes. BONES: No acute osseous abnormality. Postsurgical change of L4-L5 lumbar fusion with bilateral transpedicular screws and vertical rods and disc spacer placement at L4-L5. Posterior decompression at L3-L4 and L4-L5. ABDOMINAL WALL: Within normal limits. LYMPH NODES: No pathologically enlarged lymph nodes in the abdomen or pelvis.   ABDOMEN:   LIVER: Normal size and contour. No focal hepatic lesions. BILE DUCTS: Normal caliber. GALLBLADDER: No calcified gallstones. No wall thickening. PANCREAS: No evidence of pancreatic duct dilatation or peripancreatic edema. SPLEEN: Within normal limits. ADRENALS: Within normal limits. KIDNEYS and URETERS: No evidence of hydronephrosis or nephrolithiasis. There is mild bilateral renal cortical thinning with symmetric enhancement. There is a 2.3 cm left upper pole renal cyst.     VESSELS: No aortic aneurysm. There is circumferential atherosclerotic plaque of the abdominal aorta and its branches with atherosclerotic stenosis at the origin of the renal arteries. Mesenteric vessels appear patent. 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 RETROPERITONEUM: No evidence of retroperitoneal hematoma.   PELVIS:   REPRODUCTIVE ORGANS: No pelvic masses. BLADDER: No gross abnormality.   BOWEL: Small hiatal hernia. The stomach is partially collapsed limiting assessment, without evidence of gross abnormality. There is a 3.1 x 3.1 cm gas-filled outpouching from the medial aspect of the 2nd portion of the duodenum likely representing a simple diverticulum. No  bowel dilatation or obstruction. Normal appendix. Formed stool throughout the colon without wall thickening or acute inflammatory change. PERITONEUM: No ascites or free air, no fluid collection.       1.  No acute abnormality in the abdomen/pelvis. 3.1 cm gas-filled outpouching from the medial aspect of the 2nd portion of the duodenum likely representing a diverticulum, however correlation with prior endoscopy is recommended. Atherosclerotic disease with mild bilateral renal cortical thinning and atherosclerotic stenosis at the origin of the bilateral renal arteries. 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 2. Small bilateral pleural effusions and interstitial lung edema.     Signed by: Farhan Srivastava 6/22/2024 11:12 PM Dictation workstation:   [id number]    XR chest 2 views    Result Date: 6/22/2024  Interpreted By:  Farhan Srivastava, STUDY: XR CHEST 2 VIEWS;  6/22/2024 10:35 pm   INDICATION: Signs/Symptoms:sob.   COMPARISON: Chest radiograph 06/15/2024. Same day CT abdomen.   ACCESSION NUMBER(S): [id number]   ORDERING CLINICIAN: DIA OWENS   FINDINGS:         CARDIOMEDIASTINAL SILHOUETTE: Cardiomediastinal silhouette is normal in size and configuration. Sternotomy changes similar to prior.   LUNGS: There are small bilateral pleural effusions. Increased interstitial markings suggestive of mild pulmonary venous congestion. No consolidation.   ABDOMEN: No remarkable upper abdominal findings.   BONES: No acute osseous changes.       1. Small bilateral pleural effusions and mild pulmonary venous congestion.       MACRO: None   Signed by: Farhan Srivastava 6/22/2024 11:04 PM Dictation workstation:   [id number]    ECG 12 Lead    Result Date: 6/21/2024  1 Poor data quality, interpretation may be adversely affected Sinus tachycardia Possible Left atrial enlargement ST & T  wave abnormality, consider inferior ischemia Abnormal ECG When compared with ECG of 15-LUCILA-2024 08:29, ST no longer elevated in Inferior leads Nonspecific T wave abnormality now evident in Inferior leads Nonspecific T wave abnormality, worse in Anterolateral leads    ECG 12 lead    Result Date: 6/17/2024  1 Poor data quality, interpretation may be adversely affected Normal sinus rhythm Nonspecific ST and T wave abnormality Abnormal ECG When compared with ECG of 14-AUG-2023 11:14, No significant change was found Confirmed by Bennie Altman ([id number]) on 6/17/2024 5:09:40 PM    Esophagogastroduodenoscopy (EGD)    Result Date: 6/17/2024  Table formatting from the original result was not included. Impression Irregular Z-line Small hiatal hernia Single 1cm ulcer prepyloric shallow Mild abnormal mucosa, consistent with gastritis in the antrum; performed cold forceps biopsy Findings Irregular Z-line Small hiatal hernia Single small benign-appearing ulcer Mild abnormal mucosa in the antrum, consistent with gastritis; performed cold forceps biopsy to rule out H. pylori; Recommendation  Continue Protonix Avoid NSAIDS Follow up biopsies Repeat EGD to document healing in 6 weeks Indication Melena, GI bleed due to NSAIDs, Symptomatic anemia Staff Staff Role Yaya Smith MD Proceduralist Medications See Anesthesia Record. Preprocedure A history and physical has been performed, and patient medication allergies have been reviewed. The patient's tolerance of previous anesthesia has been reviewed. The risks and benefits of the procedure and the sedation options and risks were discussed with the patient. All questions were answered and informed consent obtained. Details of the Procedure The patient underwent monitored anesthesia care, which was administered by an anesthesia professional. The patient's blood pressure, ECG, ETCO2, heart rate, level of consciousness, oxygen and respirations were monitored throughout the procedure. The  scope was introduced through the mouth and advanced to the second part of the duodenum. Retroflexion was performed in the cardia. Events Procedure Events Event Event Time ENDO SCOPE IN TIME 6/17/2024 11:54 AM ENDO SCOPE OUT TIME 6/17/2024 11:58 AM Specimens ID Type Source Tests Collected by Time 1 : r/o H Pylori Tissue STOMACH ANTRUM BIOPSY SURGICAL PATHOLOGY EXAM Abigail Reis, RN 6/17/2024 1157 Procedure Location [address] [facility] [id number] [phone] Referring Provider Sujatha Owusu, APRN-CNP Procedure Provider Yaya Smith MD     XR chest 1 view    Result Date: 6/15/2024  Interpreted By:  Ozzie Romano, STUDY: XR CHEST 1 VIEW;  6/15/2024 3:58 pm   INDICATION: Signs/Symptoms:SOB, CP.   COMPARISON: None.   ACCESSION NUMBER(S): [id number]   ORDERING CLINICIAN: TANJA RICKETTS   FINDINGS: Mediastinal wires present.   CARDIOMEDIASTINAL SILHOUETTE: Cardiomediastinal silhouette is normal in size and configuration.   LUNGS: Lungs are clear. There is mild hyperinflation. There is no consolidation or edema   ABDOMEN: No remarkable upper abdominal findings.   BONES: No acute osseous changes.       1.  No evidence of acute cardiopulmonary process.       MACRO: None   Signed by: Ozzie Romano 6/15/2024 4:10 PM Dictation workstation:   [id number]        Assessment/Plan     Abnormal CT, Hx PUD, Anemia    -CT showing likely duodenal diverticulum. No GI intervention required. This was incidental findings    -He had ulcer on endoscopy 6/17. Please continue PPI BID x 8 weeks total with plan for repeat EGD in about a month (6 weeks following initial EGD)     GI will sign off at this time. HH stable. No further melena      I spent 30 minutes in the professional and overall care of this patient.

## 2024-06-23 NOTE — CARE PLAN
The patient's goals for the shift include      The clinical goals for the shift include Improve breathing    Over the shift, the patient did not make progress toward the following goals. Barriers to progression include . Recommendations to address these barriers include .

## 2024-06-23 NOTE — CONSULTS
Consults  History Of Present Illness:    Herminio Devlin is a 84 y.o. male presenting with shortness of breath.    The patient is a 84-year-old white male whose past medical history includes hypertension, hyperlipidemia, and coronary artery disease.  The patient underwent cardiac catheterization in 3/2005 which demonstrated two-vessel CAD with diffuse coronary artery calcification and a right dominant system.  The LMCA had mild narrowing.  The LAD had mild diffuse proximal disease.  The mid LAD had a narrowing of up to 50%.  The more distal LAD was unremarkable.  The first diagonal branch had a 60% elongated proximal lesion.  The LCx had a 70% proximal lesion just before the first large obtuse marginal branch.  The dominant RCA had mild diffuse proximal disease.  The mid RCA had a 70% stenosis.  The more distal RCA was free of significant stenosis.  The left ventriculogram showed a preserved LV ejection fraction of 70% with no mitral valve regurgitation.  Abdominal aortography was notable for essentially normal renal arteries bilaterally.  The patient did undergo atherectomy and stent deployment to the LCx at that time.  The patient underwent a repeat cardiac catheterization in 3/2015 which showed worsening of his triple-vessel coronary artery disease and he underwent CABG x 4 on 3/20/2015 with LIMA to LAD, SVG to first obtuse marginal branch, SVG to second obtuse marginal branch, and an SVG to the distal RCA.  The patient did have a repeat cardiac catheterization on 3/10/2021 which showed a widely patent LIMA to LAD and SVG to RCA with totally occluded SVG to first obtuse marginal branch and totally occluded SVG to second marginal branch.  He had mild aortic valve stenosis with normal LV function.  Patient also has a history of from peripheral arterial disease and he did undergo peripheral arteriography on 1/6/2015.  This demonstrated high-grade bilateral ostial iliac artery disease for which she underwent kissing  stent technique with 238 mm atrium covered stents to the iliacs and extending into the distal abdominal aorta.  Patient also has carotid vascular disease with a right carotid endarterectomy.  He did have a carotid ultrasound done on 6/23/2022 showing less than 50% stenosis of the right internal carotid artery as well as the left internal carotid artery.  The patient also has a history of aortic valvular stenosis with most recent echocardiogram 3/11/2024 showing a preserved LV ejection fraction at 60 to 65% abnormal septal motion due to postoperative status moderate aortic valve calcification mild to moderate aortic valve stenosis peak systolic pressure gradient 26 mmHg.  At the time of the patient's last outpatient cardiology follow-up he was on Repatha Imdur lovastatin no diuretic.    The patient recently presented to the [facility] emergency room on the morning of 6/15/2024 complaining of nausea vomiting possible component of hematemesis.  He had high-sensitivity troponins of 70 and 81 at that time.  He was discharged but then returned to the emergency room later that day complaining of shortness of breath.  He had a third troponin at that time of 91.  The patient's EKG showed some nonspecific ST-T abnormalities.  The patient was started on oral aspirin IV heparin and Nitropaste by covering cardiologist.  The patient subsequently is identified as having melanotic stools his antiplatelet agents were held IV heparin was actually reversed with protamine.  Patient was noted to be anemic and underwent an upper endoscopy on 6/17/2024 which identified a small ulcer.  The patient was started on Protonix Carafate and was scheduled to see gastroenterology as an outpatient.    Patient presented back to the [facility]note 3[facility] emergency room yesterday because of progressive shortness of breath.  Initial evaluation in the emergency room included a CBC with hematocrit 28.7 WBC of 15,600.  His hematocrit had been 26.4 on 6/18/2024  when discharged.  Platelet count was in normal range.  Electrolytes unremarkable other than for glucose of 164.  proBNP was 5472.  Initial high-sensitivity troponin was 368 repeated at 381.  Chest x-ray showed small bilateral pleural effusions mild pulmonary venous congestion.  He had an abdominal CT scan done which showed no acute abnormalities with a duodenal diverticulum.  And atherosclerotic disease with mild bilateral renal cortical thinning atherosclerotic stenosis at origin of the bilateral renal arteries.  There appeared to be a short segment of occlusion of left common femoral artery with distal reconstitution.     Last Recorded Vitals:  Vitals:    06/23/24 0300 06/23/24 0500 06/23/24 0537 06/23/24 0805   BP: 134/56  161/68 137/60   BP Location:   Left arm Left arm   Patient Position:   Sitting Sitting   Pulse: 97 90 102 94   Resp:   21 17   Temp:   36.7 °C (98.1 °F) 36.1 °C (97 °F)   TempSrc:   Temporal Temporal   SpO2:   97% 96%   Weight:       Height:           Last Labs:  CBC - 6/23/2024:  5:50 AM  14.4 9.1 329    28.6      CMP - 6/23/2024:  5:51 AM  8.4 6.6 49 --- 0.3   _ 3.6 18 67      PTT - 6/15/2024:  6:14 PM  1.1   11.2 64.2     Hemoglobin A1C   Date/Time Value Ref Range Status   05/13/2024 08:15 AM 5.7 (H) See below % Final   09/25/2023 08:08 AM 5.7 4.0 - 6.0 % Final     Comment:     Hemoglobin A1C levels are related to mean blood glucose during the   preceding 2-3 months. The relationship table below may be used as a   general guide. Each 1% increase in HGB A1C is a reflection of an   increase in mean glucose of approximately 30 mg/dl.   Reference: Diabetes Care, volume 29, supplement 1 Jan. 2006                        HGB A1C ................. Approx. Mean Glucose   _______________________________________________   6%   ...............................  120 mg/dl   7%   ...............................  150 mg/dl   8%   ...............................  180 mg/dl   9%    ...............................  210 mg/dl   10%  ...............................  240 mg/dl  Performed at [address] [address]     03/20/2023 08:45 AM 5.9 4.0 - 6.0 % Final     Comment:     Hemoglobin A1C levels are related to mean blood glucose during the   preceding 2-3 months. The relationship table below may be used as a   general guide. Each 1% increase in HGB A1C is a reflection of an   increase in mean glucose of approximately 30 mg/dl.   Reference: Diabetes Care, volume 29, supplement 1 Jan. 2006                        HGB A1C ................. Approx. Mean Glucose   _______________________________________________   6%   ...............................  120 mg/dl   7%   ...............................  150 mg/dl   8%   ...............................  180 mg/dl   9%   ...............................  210 mg/dl   10%  ...............................  240 mg/dl  Performed at [address] [address]     07/27/2020 11:42 AM 5.9 (H) 4.3 - 5.6 % Final     Comment:     American Diabetes Association guidelines indicate that patients with HgbA1c in   the range 5.7-6.4% are at increased risk for development of diabetes, and   intervention by lifestyle modification may be beneficial. HgbA1c greater or   equal to 6.5% is considered diagnostic of diabetes.     LDL Calculated   Date/Time Value Ref Range Status   05/13/2024 08:15 AM 18 (L) 65 - 130 mg/dL Final   09/25/2023 08:08 AM 4 (L) 65 - 130 MG/DL Final   03/20/2023 08:45 AM 9 (L) 65 - 130 MG/DL Final   09/15/2022 08:43 AM 10 (L) 65 - 130 MG/DL Final     VLDL   Date/Time Value Ref Range Status   11/04/2020 08:20 AM 37 0 - 40 mg/dL Final   08/04/2020 08:15 AM 46 (H) 0 - 40 mg/dL Final   05/28/2020 08:13 AM 35 0 - 40 mg/dL Final      Last I/O:  No intake/output data recorded.    Past Cardiology Tests (Last 3 Years):  EKG:  ECG 12 Lead 06/15/2024 (Preliminary)      ECG 12 lead 06/15/2024    Echo:  Transthoracic Echo (TTE)  Complete 03/11/2024    Ejection Fractions:  EF   Date/Time Value Ref Range Status   03/11/2024 01:41 PM 66 %      Cath:  No results found for this or any previous visit from the past 1095 days.    Stress Test:  No results found for this or any previous visit from the past 1095 days.    Cardiac Imaging:  No results found for this or any previous visit from the past 1095 days.      Past Medical History:  He has a past medical history of Atherosclerotic heart disease of native coronary artery without angina pectoris (10/25/2022), Disorder of prostate, unspecified, Essential (primary) hypertension (12/02/2020), Hyperlipidemia, unspecified (10/25/2022), Occlusion and stenosis of unspecified carotid artery (10/25/2022), and Personal history of other diseases of urinary system.    Past Surgical History:  He has a past surgical history that includes Other surgical history (02/24/2014); Coronary artery bypass graft (11/09/2015); and Other surgical history (02/01/2016).      Social History:  He reports that he quit smoking about 30 years ago. His smoking use included cigarettes. He started smoking about 71 years ago. He has a 41 pack-year smoking history. He has been exposed to tobacco smoke. He has never used smokeless tobacco. He reports that he does not drink alcohol and does not use drugs.    Family History:  Family History   Problem Relation Name Age of Onset    Coronary artery disease Brother          Allergies:  Patient has no known allergies.    Inpatient Medications:  Scheduled medications   Medication Dose Route Frequency    allopurinol  300 mg oral Daily    ferrous sulfate (325 mg ferrous sulfate)  65 mg of iron oral Daily    furosemide  40 mg intravenous Once    metoprolol tartrate  50 mg oral BID    pantoprazole  40 mg oral Daily    sucralfate  1 g oral 4x daily     PRN medications   Medication    acetaminophen    Or    acetaminophen    Or    acetaminophen    benzocaine-menthol    dextromethorphan-guaifenesin     guaiFENesin    ondansetron ODT    Or    ondansetron    polyethylene glycol     Continuous Medications   Medication Dose Last Rate     Outpatient Medications:  Current Outpatient Medications   Medication Instructions    acetaminophen (TYLENOL) 325-650 mg, oral, Every 8 hours PRN, every 4-6 hours as needed    allopurinol (ZYLOPRIM) 300 mg, oral, Daily    ferrous sulfate 325 (65 Fe) MG EC tablet 1 tablet, oral, Daily with breakfast, Do not crush, chew, or split.    gabapentin (NEURONTIN) 600 mg, oral, 3 times daily    metoprolol tartrate (LOPRESSOR) 50 mg, oral, 2 times daily    pantoprazole (PROTONIX) 40 mg, oral, Daily, Do not crush, chew, or split.    sucralfate (CARAFATE) 1 g, oral, 4 times daily, Take 1 hour before meals and at bedtime       Physical Exam:  The patient is a relatively well-appearing mildly overweight elderly white male lying in bed in no distress.  Respirations are comfortable on room air lying supine.  JVP not elevated carotid impulses 2+ with right-sided bruit.  Chest fair to shallow air movement breath sounds clear diminished  The cardiac rhythm is regular no premature beats.  S1-S2 obscured grade 3/6 to 4/6 systolic ejection murmur  Abdomen is obese soft and nontender  There is no peripheral edema.     Assessment/Plan     6/23: The patient is a 84-year-old white male with an extensive past medical history outlined above including hypertension hyperlipidemia coronary artery disease remote PCI atherectomy stent deployment to the LCx in 2005, CABG x 4 in 2015, carotid vascular disease with right carotid endarterectomy, peripheral arterial disease with bilateral kissing stents to high-grade common iliac artery stenoses in 2015.  Patient also has a history of mild to moderate aortic valve stenosis.  Patient recently recently admitted with nausea vomiting and hematemesis identified to have a small benign ulcer on EGD.  Patient discharged but returns with complaining of some shortness of breath.   High-sensitivity troponins are slightly elevated.  EKG shows sinus tachycardia with a nonspecific ST abnormality.  Patient will be restarted on Toprol- mg daily plus amlodipine 5 mg daily.  Hesitate to use an ARB given the fact that he does have renal artery stenoses.  He will be started on Lasix 40 mg daily for mild CHF.  His echocardiogram performed earlier this year in 3/2024 showed a preserved LV ejection fraction with mild to moderate aortic valvular stenosis.  Patient was started on intravenous iron to expedite recovery of blood loss anemia.  Patient's antiplatelet therapy on temporary hold due to the recent bleeding ulcer.    Peripheral IV 06/22/24 20 G Right Antecubital (Active)   Site Assessment Clean;Dry;Intact 06/23/24 0855   Dressing Status Clean;Dry;Occlusive 06/23/24 0855   Number of days: 1       Code Status:  Full Code    I spent  minutes in the professional and overall care of this patient.        Garrison Chavarria MD

## 2024-06-24 LAB
ANION GAP SERPL CALC-SCNC: 8 MMOL/L
ATRIAL RATE: 102 BPM
BUN SERPL-MCNC: 15 MG/DL (ref 8–25)
CALCIUM SERPL-MCNC: 8.4 MG/DL (ref 8.5–10.4)
CHLORIDE SERPL-SCNC: 104 MMOL/L (ref 97–107)
CO2 SERPL-SCNC: 27 MMOL/L (ref 24–31)
CREAT SERPL-MCNC: 1 MG/DL (ref 0.4–1.6)
EGFRCR SERPLBLD CKD-EPI 2021: 75 ML/MIN/1.73M*2
GLUCOSE BLD MANUAL STRIP-MCNC: 133 MG/DL (ref 74–99)
GLUCOSE BLD MANUAL STRIP-MCNC: 98 MG/DL (ref 74–99)
GLUCOSE SERPL-MCNC: 103 MG/DL (ref 65–99)
P AXIS: 69 DEGREES
P OFFSET: 163 MS
P ONSET: 113 MS
POTASSIUM SERPL-SCNC: 3.1 MMOL/L (ref 3.4–5.1)
PR INTERVAL: 190 MS
Q ONSET: 208 MS
QRS COUNT: 17 BEATS
QRS DURATION: 82 MS
QT INTERVAL: 390 MS
QTC CALCULATION(BAZETT): 508 MS
QTC FREDERICIA: 465 MS
R AXIS: 24 DEGREES
SODIUM SERPL-SCNC: 139 MMOL/L (ref 133–145)
T AXIS: 80 DEGREES
T OFFSET: 403 MS
TROPONIN T SERPL-MCNC: 725 NG/L
TROPONIN T SERPL-MCNC: 766 NG/L
TROPONIN T SERPL-MCNC: 788 NG/L
VENTRICULAR RATE: 102 BPM

## 2024-06-24 PROCEDURE — 2060000001 HC INTERMEDIATE ICU ROOM DAILY

## 2024-06-24 PROCEDURE — 2500000001 HC RX 250 WO HCPCS SELF ADMINISTERED DRUGS (ALT 637 FOR MEDICARE OP): Performed by: INTERNAL MEDICINE

## 2024-06-24 PROCEDURE — 2500000004 HC RX 250 GENERAL PHARMACY W/ HCPCS (ALT 636 FOR OP/ED)

## 2024-06-24 PROCEDURE — 2500000002 HC RX 250 W HCPCS SELF ADMINISTERED DRUGS (ALT 637 FOR MEDICARE OP, ALT 636 FOR OP/ED): Performed by: INTERNAL MEDICINE

## 2024-06-24 PROCEDURE — 80048 BASIC METABOLIC PNL TOTAL CA: CPT | Performed by: INTERNAL MEDICINE

## 2024-06-24 PROCEDURE — 82947 ASSAY GLUCOSE BLOOD QUANT: CPT | Mod: 91

## 2024-06-24 PROCEDURE — 99232 SBSQ HOSP IP/OBS MODERATE 35: CPT | Performed by: INTERNAL MEDICINE

## 2024-06-24 PROCEDURE — 84484 ASSAY OF TROPONIN QUANT: CPT | Mod: 91 | Performed by: INTERNAL MEDICINE

## 2024-06-24 PROCEDURE — 84484 ASSAY OF TROPONIN QUANT: CPT | Performed by: INTERNAL MEDICINE

## 2024-06-24 PROCEDURE — 97116 GAIT TRAINING THERAPY: CPT | Mod: GP

## 2024-06-24 PROCEDURE — 36415 COLL VENOUS BLD VENIPUNCTURE: CPT | Performed by: INTERNAL MEDICINE

## 2024-06-24 PROCEDURE — 2500000002 HC RX 250 W HCPCS SELF ADMINISTERED DRUGS (ALT 637 FOR MEDICARE OP, ALT 636 FOR OP/ED): Performed by: FAMILY MEDICINE

## 2024-06-24 PROCEDURE — 97165 OT EVAL LOW COMPLEX 30 MIN: CPT | Mod: GO

## 2024-06-24 PROCEDURE — 2500000004 HC RX 250 GENERAL PHARMACY W/ HCPCS (ALT 636 FOR OP/ED): Performed by: FAMILY MEDICINE

## 2024-06-24 PROCEDURE — 97110 THERAPEUTIC EXERCISES: CPT | Mod: GP

## 2024-06-24 RX ORDER — POTASSIUM CHLORIDE 20 MEQ/1
60 TABLET, EXTENDED RELEASE ORAL ONCE
Status: COMPLETED | OUTPATIENT
Start: 2024-06-24 | End: 2024-06-24

## 2024-06-24 RX ORDER — POTASSIUM CHLORIDE 20 MEQ/1
20 TABLET, EXTENDED RELEASE ORAL DAILY
Status: DISCONTINUED | OUTPATIENT
Start: 2024-06-24 | End: 2024-06-25 | Stop reason: HOSPADM

## 2024-06-24 RX ORDER — LORATADINE 10 MG/1
10 TABLET ORAL DAILY
Status: DISCONTINUED | OUTPATIENT
Start: 2024-06-24 | End: 2024-06-25 | Stop reason: HOSPADM

## 2024-06-24 RX ADMIN — METOPROLOL SUCCINATE 100 MG: 100 TABLET, EXTENDED RELEASE ORAL at 09:53

## 2024-06-24 RX ADMIN — SUCRALFATE 1 G: 1 TABLET ORAL at 20:39

## 2024-06-24 RX ADMIN — AMLODIPINE BESYLATE 5 MG: 5 TABLET ORAL at 09:52

## 2024-06-24 RX ADMIN — SUCRALFATE 1 G: 1 TABLET ORAL at 06:31

## 2024-06-24 RX ADMIN — FERROUS SULFATE TAB 325 MG (65 MG ELEMENTAL FE) 1 TABLET: 325 (65 FE) TAB at 09:52

## 2024-06-24 RX ADMIN — PANTOPRAZOLE SODIUM 40 MG: 40 TABLET, DELAYED RELEASE ORAL at 09:52

## 2024-06-24 RX ADMIN — FUROSEMIDE 40 MG: 40 TABLET ORAL at 09:52

## 2024-06-24 RX ADMIN — SUCRALFATE 1 G: 1 TABLET ORAL at 16:24

## 2024-06-24 RX ADMIN — POTASSIUM CHLORIDE 60 MEQ: 1500 TABLET, EXTENDED RELEASE ORAL at 09:57

## 2024-06-24 RX ADMIN — LORATADINE 10 MG: 10 TABLET ORAL at 17:54

## 2024-06-24 RX ADMIN — IRON SUCROSE 200 MG: 20 INJECTION, SOLUTION INTRAVENOUS at 05:33

## 2024-06-24 RX ADMIN — GABAPENTIN 600 MG: 600 TABLET, FILM COATED ORAL at 21:13

## 2024-06-24 RX ADMIN — ROSUVASTATIN CALCIUM 40 MG: 20 TABLET, COATED ORAL at 20:39

## 2024-06-24 RX ADMIN — GABAPENTIN 600 MG: 600 TABLET, FILM COATED ORAL at 16:24

## 2024-06-24 RX ADMIN — GABAPENTIN 600 MG: 600 TABLET, FILM COATED ORAL at 05:33

## 2024-06-24 RX ADMIN — ALLOPURINOL 300 MG: 300 TABLET ORAL at 09:52

## 2024-06-24 RX ADMIN — PANTOPRAZOLE SODIUM 40 MG: 40 TABLET, DELAYED RELEASE ORAL at 20:39

## 2024-06-24 ASSESSMENT — COGNITIVE AND FUNCTIONAL STATUS - GENERAL
TOILETING: A LITTLE
TOILETING: A LITTLE
WALKING IN HOSPITAL ROOM: A LITTLE
DAILY ACTIVITIY SCORE: 21
WALKING IN HOSPITAL ROOM: A LITTLE
DRESSING REGULAR LOWER BODY CLOTHING: A LITTLE
DRESSING REGULAR UPPER BODY CLOTHING: A LITTLE
HELP NEEDED FOR BATHING: A LITTLE
CLIMB 3 TO 5 STEPS WITH RAILING: A LITTLE
CLIMB 3 TO 5 STEPS WITH RAILING: A LITTLE
DRESSING REGULAR UPPER BODY CLOTHING: A LITTLE
PERSONAL GROOMING: A LITTLE
PERSONAL GROOMING: A LITTLE
MOBILITY SCORE: 22
MOBILITY SCORE: 22
DAILY ACTIVITIY SCORE: 19

## 2024-06-24 ASSESSMENT — PAIN SCALES - GENERAL
PAINLEVEL_OUTOF10: 0 - NO PAIN

## 2024-06-24 ASSESSMENT — ACTIVITIES OF DAILY LIVING (ADL)
LACK_OF_TRANSPORTATION: NO
ADL_ASSISTANCE: INDEPENDENT
BATHING_ASSISTANCE: STAND BY

## 2024-06-24 ASSESSMENT — PAIN - FUNCTIONAL ASSESSMENT
PAIN_FUNCTIONAL_ASSESSMENT: 0-10
PAIN_FUNCTIONAL_ASSESSMENT: 0-10

## 2024-06-24 NOTE — PROGRESS NOTES
Subjective Data:      Overnight Events:         Objective Data:  Last Recorded Vitals:  Vitals:    06/24/24 0410 06/24/24 0452 06/24/24 0721 06/24/24 0800   BP: 133/56      BP Location: Left arm      Patient Position: Lying      Pulse: 70  97    Resp: 17  14    Temp: 36.2 °C (97.2 °F)   36.3 °C (97.3 °F)   TempSrc: Temporal   Temporal   SpO2: 96%  98%    Weight:  81.5 kg (179 lb 10.8 oz)     Height:           Last Labs:  CBC - 6/23/2024:  5:50 AM  14.4 9.1 329    28.6      CMP - 6/24/2024:  5:15 AM  8.4 6.6 49 --- 0.3   _ 3.6 18 67      PTT - 6/15/2024:  6:14 PM  1.1   11.2 64.2     HGBA1C   Date/Time Value Ref Range Status   05/13/2024 08:15 AM 5.7 See below % Final   09/25/2023 08:08 AM 5.7 4.0 - 6.0 % Final     Comment:     Hemoglobin A1C levels are related to mean blood glucose during the   preceding 2-3 months. The relationship table below may be used as a   general guide. Each 1% increase in HGB A1C is a reflection of an   increase in mean glucose of approximately 30 mg/dl.   Reference: Diabetes Care, volume 29, supplement 1 Jan. 2006                        HGB A1C ................. Approx. Mean Glucose   _______________________________________________   6%   ...............................  120 mg/dl   7%   ...............................  150 mg/dl   8%   ...............................  180 mg/dl   9%   ...............................  210 mg/dl   10%  ...............................  240 mg/dl  Performed at [address] [address]     03/20/2023 08:45 AM 5.9 4.0 - 6.0 % Final     Comment:     Hemoglobin A1C levels are related to mean blood glucose during the   preceding 2-3 months. The relationship table below may be used as a   general guide. Each 1% increase in HGB A1C is a reflection of an   increase in mean glucose of approximately 30 mg/dl.   Reference: Diabetes Care, volume 29, supplement 1 Jan. 2006                        HGB A1C ................. Approx. Mean Glucose    _______________________________________________   6%   ...............................  120 mg/dl   7%   ...............................  150 mg/dl   8%   ...............................  180 mg/dl   9%   ...............................  210 mg/dl   10%  ...............................  240 mg/dl  Performed at [address] [address]     07/27/2020 11:42 AM 5.9 4.3 - 5.6 % Final     Comment:     American Diabetes Association guidelines indicate that patients with HgbA1c in   the range 5.7-6.4% are at increased risk for development of diabetes, and   intervention by lifestyle modification may be beneficial. HgbA1c greater or   equal to 6.5% is considered diagnostic of diabetes.     LDLCALC   Date/Time Value Ref Range Status   05/13/2024 08:15 AM 18 65 - 130 mg/dL Final   09/25/2023 08:08 AM 4 65 - 130 MG/DL Final   03/20/2023 08:45 AM 9 65 - 130 MG/DL Final   09/15/2022 08:43 AM 10 65 - 130 MG/DL Final     VLDL   Date/Time Value Ref Range Status   11/04/2020 08:20 AM 37 0 - 40 mg/dL Final   08/04/2020 08:15 AM 46 0 - 40 mg/dL Final   05/28/2020 08:13 AM 35 0 - 40 mg/dL Final      Last I/O:  I/O last 3 completed shifts:  In: 390 (4.8 mL/kg) [P.O.:390]  Out: 200 (2.5 mL/kg) [Urine:200 (0.1 mL/kg/hr)]  Weight: 81.5 kg     Past Cardiology Tests (Last 3 Years):  EKG:  ECG 12 Lead 06/22/2024 (Preliminary)      ECG 12 Lead 06/15/2024      ECG 12 lead 06/15/2024    Echo:  Transthoracic Echo (TTE) Complete 03/11/2024    Ejection Fractions:  EF   Date/Time Value Ref Range Status   03/11/2024 01:41 PM 66 %      Cath:  No results found for this or any previous visit from the past 1095 days.    Stress Test:  No results found for this or any previous visit from the past 1095 days.    Cardiac Imaging:  No results found for this or any previous visit from the past 1095 days.      Inpatient Medications:  Scheduled medications   Medication Dose Route Frequency    allopurinol  300 mg oral Daily    amLODIPine  5  mg oral Daily    ferrous sulfate (325 mg ferrous sulfate)  65 mg of iron oral Daily    furosemide  40 mg oral Daily    gabapentin  600 mg oral q8h    iron sucrose  200 mg intravenous Daily    metoprolol succinate XL  100 mg oral Daily    pantoprazole  40 mg oral BID    rosuvastatin  40 mg oral Nightly    sucralfate  1 g oral 4x daily     PRN medications   Medication    acetaminophen    Or    acetaminophen    Or    acetaminophen    benzocaine-menthol    dextromethorphan-guaifenesin    guaiFENesin    ondansetron ODT    Or    ondansetron    polyethylene glycol     Continuous Medications   Medication Dose Last Rate       Physical Exam:       Assessment/Plan   6/23: The patient is a 84-year-old white male with an extensive past medical history outlined above including hypertension hyperlipidemia coronary artery disease remote PCI atherectomy stent deployment to the LCx in 2005, CABG x 4 in 2015, carotid vascular disease with right carotid endarterectomy, peripheral arterial disease with bilateral kissing stents to high-grade common iliac artery stenoses in 2015. Patient also has a history of mild to moderate aortic valve stenosis. Patient recently recently admitted with nausea vomiting and hematemesis identified to have a small benign ulcer on EGD. Patient discharged but returns with complaining of some shortness of breath. High-sensitivity troponins are slightly elevated. EKG shows sinus tachycardia with a nonspecific ST abnormality. Patient will be restarted on Toprol- mg daily plus amlodipine 5 mg daily. Hesitate to use an ARB given the fact that he does have renal artery stenoses. He will be started on Lasix 40 mg daily for mild CHF. His echocardiogram performed earlier this year in 3/2024 showed a preserved LV ejection fraction with mild to moderate aortic valvular stenosis. Patient was started on intravenous iron to expedite recovery of blood loss anemia. Patient's antiplatelet therapy on temporary hold due  to the recent bleeding ulcer.     6/24: The patient remains clinically improved.  Breathing has returned to normal without any associated chest discomfort.  The patient's repeat high-sensitivity troponins remain elevated at 788 and 766 consistent with non-STEMI presumably type II from demand ischemia.  Will need to discuss with his usual cardiologist Dr. Yeboah upon his return whether or not a repeat catheterization is required at some point in near future.  As noted he did have a repeat catheterization in 2021 which showed closure of both saphenous vein graft to the 2 marginal branches of the LCx but with patent saphenous vein graft to RCA and LIMA to LAD.  The patient's antiplatelet therapy remains on hold due to recent upper GI bleed and documented ulcer.  The patient will remain on Lasix 40 mg daily as a maintenance medication.  Blood pressure readings appear to be acceptable in the 130-140 mmHg range on the Toprol- mg daily amlodipine 5 mg daily.  The basic metabolic panel remains in range with creatinine 1.0 but potassium needs supplementation at 3.1.    Peripheral IV 06/22/24 20 G Right Antecubital (Active)   Site Assessment Clean;Dry;Intact 06/23/24 2100   Dressing Status Clean;Dry;Occlusive 06/23/24 2100   Number of days: 2       Code Status:  Full Code    I spent  minutes in the professional and overall care of this patient.        Garrison Chavarria MD

## 2024-06-24 NOTE — PROGRESS NOTES
Spiritual Care Visit    Clinical Encounter Type  Visited With: Patient not available     Alfonso Lakhani

## 2024-06-24 NOTE — CARE PLAN
Problem: Pain - Adult  Goal: Verbalizes/displays adequate comfort level or baseline comfort level  Outcome: Progressing     Problem: Safety - Adult  Goal: Free from fall injury  Outcome: Progressing     Problem: Discharge Planning  Goal: Discharge to home or other facility with appropriate resources  Outcome: Progressing     Problem: Chronic Conditions and Co-morbidities  Goal: Patient's chronic conditions and co-morbidity symptoms are monitored and maintained or improved  Outcome: Progressing     Problem: Diabetes  Goal: Maintain electrolyte levels within acceptable range throughout shift  Outcome: Progressing  Goal: Maintain glucose levels >70mg/dl to <250mg/dl throughout shift  Outcome: Progressing  Goal: No changes in neurological exam by end of shift  Outcome: Progressing  Goal: Vital signs within normal range for age by end of shift  Outcome: Progressing     Problem: Heart Failure  Goal: Improved gas exchange this shift  Outcome: Progressing  Goal: Improved urinary output this shift  Outcome: Progressing  Goal: Reduction in peripheral edema within 24 hours  Outcome: Progressing  Goal: Report improvement of dyspnea/breathlessness this shift  Outcome: Progressing  Goal: Weight from fluid excess reduced over 2-3 days, then stabilize  Outcome: Progressing  Goal: Increase self care and/or family involvement in 24 hours  Outcome: Progressing   The patient's goals for the shift include      The clinical goals for the shift include Improve breathing    Over the shift, the patient did not make progress toward the following goals. Barriers to progression include . Recommendations to address these barriers include .

## 2024-06-24 NOTE — CONSULTS
"Nutrition Assessement Note    Nutrition Assessment    Reason for Assessment: Dietitian discretion    Reason for Hospital Admission:  Herminio Devlin is a 84 y.o. male who is admitted for CHF.     Past Medical History:   Diagnosis Date    Atherosclerotic heart disease of native coronary artery without angina pectoris 10/25/2022    Coronary atherosclerosis    Disorder of prostate, unspecified     Prostate disorder    Essential (primary) hypertension 12/02/2020    Hypertension    Hyperlipidemia, unspecified 10/25/2022    Hyperlipidemia    Occlusion and stenosis of unspecified carotid artery 10/25/2022    Carotid artery calcification    Personal history of other diseases of urinary system     H/O bladder problems      Past Surgical History:   Procedure Laterality Date    CORONARY ARTERY BYPASS GRAFT  11/09/2015    CABG    OTHER SURGICAL HISTORY  02/24/2014    PTA Carotid Artery    OTHER SURGICAL HISTORY  02/01/2016    Previous Stent Placement     Nutrition History:  Food and Nutrient History: pt reports not adding salt to any foods at home. denies added salt when cooking. he has a good appetite and is agreeable to low Na+ info at this time.  Energy Intake: Good > 75 %    Anthropometrics:  Ht: 165.1 cm (5' 5\"), Wt: 81.5 kg (179 lb 10.8 oz), BMI: 29.9  IBW/kg (Dietitian Calculated): 61.82 kg  Percent of IBW: 132 %  Adjusted Body Weight (kg): 66.82 kg    Weight Change:  Daily Weight  06/24/24 : 81.5 kg (179 lb 10.8 oz)  06/18/24 : 75.9 kg (167 lb 6.4 oz)  06/15/24 : 82.9 kg (182 lb 12.2 oz)  06/15/24 : 83.9 kg (185 lb)  05/14/24 : 84.8 kg (187 lb)  03/11/24 : 86 kg (189 lb 9.6 oz)  02/05/24 : 83.9 kg (185 lb)  10/02/23 : 86.2 kg (190 lb)  08/14/23 : 83.1 kg (183 lb 2 oz)  03/29/23 : 89.8 kg (198 lb)     Weight History / % Weight Change: denies any significant wt changes - reports usual wt 185#.    Nutrition Focused Physical Exam Findings:   Subcutaneous Fat Loss  Orbital Fat Pads: Well nourished (slightly bulging fat " pads)  Buccal Fat Pads: Well nourished (full, rounded cheeks)    Muscle Wasting  Temporalis: Well nourished (well-defined muscle)  Pectoralis (Clavicular Region): Well nourished (clavicle not visible)  Deltoid/Trapezius: Well nourished (rounded appearance at arm, shoulder, neck)    Nutrition Significant Labs:  Lab Results   Component Value Date    WBC 14.4 (H) 06/23/2024    HGB 9.1 (L) 06/23/2024    HCT 28.6 (L) 06/23/2024     06/23/2024    CHOL 105 (L) 05/13/2024    TRIG 206 (H) 05/13/2024    HDL 46.0 05/13/2024    ALT 18 06/23/2024    AST 49 (H) 06/23/2024     06/24/2024    K 3.1 (L) 06/24/2024     06/24/2024    CREATININE 1.00 06/24/2024    BUN 15 06/24/2024    CO2 27 06/24/2024    TSH 2.14 08/10/2021    PSA 1.1 06/12/2019    INR 1.1 06/15/2024    HGBA1C 5.7 (H) 05/13/2024    ALBUR 12 09/25/2023     Nutrition Specific Medications:  allopurinol, 300 mg, oral, Daily  amLODIPine, 5 mg, oral, Daily  ferrous sulfate (325 mg ferrous sulfate), 65 mg of iron, oral, Daily  furosemide, 40 mg, oral, Daily  gabapentin, 600 mg, oral, q8h  iron sucrose, 200 mg, intravenous, Daily  metoprolol succinate XL, 100 mg, oral, Daily  pantoprazole, 40 mg, oral, BID  rosuvastatin, 40 mg, oral, Nightly  sucralfate, 1 g, oral, 4x daily      Dietary Orders (From admission, onward)       Start     Ordered    06/23/24 0307  Adult diet Carb Controlled, Cardiac; 60 gram carb/meal, 30 gram Carb evening snack; 1800 mL fluid; 70 gm fat; 2 - 3 grams Sodium  Diet effective now        Question Answer Comment   Diet type Carb Controlled    Diet type Cardiac    Carb diet selection: 60 gram carb/meal, 30 gram Carb evening snack    Dietary fluid restriction / 24h: 1800 mL fluid    Fat restriction: 70 gm fat    Sodium restriction: 2 - 3 grams Sodium        06/23/24 0456                  Estimated Needs:   Estimated Energy Needs  Total Energy Estimated Needs (kCal): 1668 kCal  Total Estimated Energy Need per Day (kCal/kg): 25  kCal/kg  Method for Estimating Needs: ABW    Estimated Protein Needs  Total Protein Estimated Needs (g): 80 g  Total Protein Estimated Needs (g/kg): 1.2 g/kg  Method for Estimating Needs: ABW    Estimated Fluid Needs  Total Fluid Estimated Needs (mL): 1800 mL  Method for Estimating Needs: FR        Nutrition Diagnosis   Nutrition Diagnosis:  Malnutrition Diagnosis  Patient has Malnutrition Diagnosis: No    Nutrition Diagnosis  Patient has Nutrition Diagnosis: Yes  Diagnosis Status (1): New  Nutrition Diagnosis 1: Food and nutrition related knowledge deficit  Related to (1): needs review of diet education  As Evidenced by (1): request for education       Nutrition Interventions/Recommendations   Nutrition Interventions and Recommendations:    Nutrition Prescription:  Individualized Nutrition Prescription Provided for : 1668 kcals and 80g protein to be provided via diet    Nutrition Interventions:   Food and/or Nutrient Delivery Interventions  Interventions: Meals and snacks  Meals and Snacks: Fat-modified diet, Fluid-modified diet, Mineral-modified diet  Goal: provide as ordered  Additional Interventions: low Na+ diet reviewed. handout provided.    Education Documentation  Heart Failure: Being Salt Smart, taught by Eva Joyce, RD, LD at 6/24/2024 10:55 AM.  Learner: Patient  Readiness: Acceptance  Method: Explanation, Handout  Response: Verbalizes Understanding             Nutrition Monitoring and Evaluation   Monitoring/Evaluation:   Food/Nutrient Related History Monitoring  Monitoring and Evaluation Plan: Energy intake  Energy Intake: Estimated energy intake  Criteria: pt to consume >/= 75% estimated needs  Additional Plans: pt will plan meals within prescribed guidelines       Time Spent/Follow-up:   Follow Up  Time Spent (min): 30 minutes  Last Date of Nutrition Visit: 06/24/24  Nutrition Follow-Up Needed?: 7-10 days  Follow up Comment: 7/1/24

## 2024-06-24 NOTE — PROGRESS NOTES
Physical Therapy    Physical Therapy Treatment    Patient Name: Herminio Devlin  MRN: [id number]  Today's Date: 6/24/2024  Time Calculation  Start Time: 1505  Stop Time: 1534  Time Calculation (min): 29 min    Documentation and services provided by PEDRO Gallo under the supervision of Licensed Physical Therapist      Assessment/Plan   PT Assessment  Rehab Prognosis: Good  Evaluation/Treatment Tolerance: Patient limited by fatigue  Medical Staff Made Aware: Yes  End of Session Communication: Bedside nurse  Assessment Comment: Pt continues to demonstrate impaired endurance and safef unctional mobility indicating the need for further in-house PT.  End of Session Patient Position: Bed, 3 rail up, Alarm on  PT Plan  Inpatient/Swing Bed or Outpatient: Inpatient  PT Plan  Treatment/Interventions: Gait training, Stair training, Endurance training  PT Plan: Ongoing PT  PT Frequency: 2 times per week  PT Discharge Recommendations: Low intensity level of continued care  Equipment Recommended upon Discharge:  (Pt states having a rollator and cane at home)  PT Recommended Transfer Status: Contact guard  PT - OK to Discharge: Yes      General Visit Information:   PT  Visit  PT Received On: 06/24/24  General  Family/Caregiver Present: No  Prior to Session Communication: Bedside nurse  Patient Position Received: Bed, 3 rail up, Alarm off, not on at start of session  Preferred Learning Style: visual, verbal  General Comment: Pt agreeable to PT follow up    Subjective     Vital Signs:  Vital Signs  SpO2:  (91-95% throughout activity on room air)    Objective   Pain:  Pain Assessment  Pain Assessment: 0-10  0-10 (Numeric) Pain Score: 0 - No pain  Cognition:  Cognition  Overall Cognitive Status: Within Functional Limits    Activity Tolerance:  Activity Tolerance  Endurance: Decreased tolerance for upright activites  Rate of Perceived Exertion (RPE): reports 5/10 with each gait trial attempt (educated Pt on pursed lip  breathing)  Treatments:  Therapeutic Exercise  Therapeutic Exercise Performed: Yes  Therapeutic Exercise Activity 1: B ankle pumps x30, B LAQs x15, B resisted hip ABD/ADD x15, B glute set x15    Bed Mobility  Bed Mobility: Yes  Bed Mobility 1  Bed Mobility 1: Supine to sitting, Sitting to supine  Level of Assistance 1: Independent    Ambulation/Gait Training  Ambulation/Gait Training Performed: Yes  Ambulation/Gait Training 1  Surface 1: Level tile  Device 1: No device  Assistance 1: Close supervision  Quality of Gait 1: Diminished heel strike, Decreased step length (intermittent unsteadiness with LOB that patient was able to self correct. This causes tariq levels to fluctuate from distant supervision to CGA)  Comments/Distance (ft) 1: 170' + 200' + 200' (Pt stated legs felt weak and needed to rest in between trials.)  Transfers  Transfer: Yes  Transfer 1  Transfer From 1: Sit to  Transfer to 1: Stand  Technique 1: Sit to stand, Stand to sit  Transfer Level of Assistance 1: Independent  Trials/Comments 1: x4 trials    Stairs  Stairs: Yes  Stairs  Rails 1: Right  Curb Step 1: No  Device 1: No device  Assistance 1: Close supervision (CS for safety due to mild unsteadiness without LOB)  Comment/Number of Steps 1: 3 sets of 4 ascending and descending. Cues on technique provided    Outcome Measures:  [facility] Basic Mobility  Turning from your back to your side while in a flat bed without using bedrails: None  Moving from lying on your back to sitting on the side of a flat bed without using bedrails: None  Moving to and from bed to chair (including a wheelchair): None  Standing up from a chair using your arms (e.g. wheelchair or bedside chair): None  To walk in hospital room: A little  Climbing 3-5 steps with railing: A little  Basic Mobility - Total Score: 22    Education Documentation  Precautions, taught by JOSELYN GalloPT at 6/24/2024  4:19 PM.  Learner: Patient  Readiness: Acceptance  Method: Explanation  Response:  Verbalizes Understanding, Demonstrated Understanding  Comment: safe mobility techniques    Home Exercise Program, taught by JOSELYN GalloPT at 6/24/2024  4:19 PM.  Learner: Patient  Readiness: Acceptance  Method: Explanation  Response: Verbalizes Understanding, Demonstrated Understanding  Comment: safe mobility techniques    Mobility Training, taught by JOSELYN GalloPT at 6/24/2024  4:19 PM.  Learner: Patient  Readiness: Acceptance  Method: Explanation  Response: Verbalizes Understanding, Demonstrated Understanding  Comment: safe mobility techniques    Education Comments  No comments found.        OP EDUCATION:       Encounter Problems       Encounter Problems (Active)       PT Problem       PT Goal 1 (Progressing)       Start:  06/23/24    Expected End:  07/07/24       Pt will ambulate 400' independent assist with no device to promote safe home mobility          PT Goal 2 (Progressing)       Start:  06/23/24    Expected End:  07/07/24       Pt will ascend/descend 3 stairs with rail(s) on R/L and modified independent assist to promote safe entry and exit in home environment            PT Goal 3 (Progressing)       Start:  06/23/24    Expected End:  07/07/24       Pt will ascend/descend 12 stairs with rail(s) on R  with modified independent assist and least restrictive device to promote safe navigation at home between floors          PT Goal 4 (Progressing)       Start:  06/23/24    Expected End:  07/07/24       Pt will correctly identify and demonstrate safe mobility techniques to reduce their risks for falls during their acute care stay              Pain - Adult

## 2024-06-24 NOTE — PROGRESS NOTES
Occupational Therapy    Evaluation    Patient Name: Herminio Devlin  MRN: [id number]  Today's Date: 6/24/2024  Time Calculation  Start Time: 0721  Stop Time: 0738  Time Calculation (min): 17 min    Assessment  IP OT Assessment  OT Assessment: 83 y/o male here with SOB. on eval, he requires slightly increased assist for xfers, mobility and self care d/t decreased strength, balance, activity tolerance.  Skilled OT services are required to maximize safety/IND prior to DC.  Prognosis: Excellent  Barriers to Discharge: Other (Comment) (patient's spouse is a patient in hospital, decreased activity tolerance)  Evaluation/Treatment Tolerance: Patient tolerated treatment well  Medical Staff Made Aware: Yes  End of Session Communication: Bedside nurse  End of Session Patient Position: Up in chair, Alarm off, not on at start of session  Plan:  Treatment Interventions: ADL retraining, Functional transfer training, UE strengthening/ROM, Endurance training, Patient/family training, Equipment evaluation/education, Neuromuscular reeducation, Compensatory technique education  OT Frequency: 3 times per week  OT Discharge Recommendations: Low intensity level of continued care  Equipment Recommended upon Discharge: Other (comment) (n/a)  OT Recommended Transfer Status: Stand by assist  OT - OK to Discharge: Yes    Subjective   Current Problem:  1. Acute heart failure, unspecified heart failure type (Multi)        2. Shortness of breath        3. Right flank pain          General:  General  Reason for Referral: Decreased ADLS; SOB  Referred By: Dr. Brody  Past Medical History Relevant to Rehab: carotid artery stenosis s/p right CEA, PAD s/p kissing stent technique in the iliacs and into the distal abdominal aorta, hyperlipidemia, hypertension and CAD s/p CABG x 4 with heart catheterization 3/10/2021  Family/Caregiver Present: No  Prior to Session Communication: Bedside nurse  Patient Position Received: Up in chair, Alarm off, not on at  start of session  Preferred Learning Style: visual, verbal  General Comment: Cleared by nsg, pt met OOB in chair, agreeable to OT assessment.  Precautions:  Medical Precautions: Fall precautions    Vital Signs:  Heart Rate: 97 (s/p > household distance mobility)  Heart Rate Source: Monitor  Resp: 14  SpO2: 98 %    Pain:  Pain Assessment  Pain Assessment: 0-10  0-10 (Numeric) Pain Score: 0 - No pain    Objective   Cognition:  Overall Cognitive Status: Within Functional Limits    Home Living:  Type of Home: House  Lives With: Spouse  Home Adaptive Equipment: Walker rolling or standard, Cane  Home Layout: Two level, Laundry main level, Full bath main level, Able to live on main level with bedroom/bathroom, Stairs to alternate level with rails  Alternate Level Stairs-Rails: Right  Home Access: Stairs to enter with rails  Entrance Stairs-Rails: Both  Entrance Stairs-Number of Steps: 3  Bathroom Shower/Tub: Walk-in shower  Bathroom Toilet: Standard  Bathroom Equipment: Grab bars in shower, Shower chair with back  Bathroom Accessibility: main floor  Home Living Comments: patient's spouse is currently a patient in the hospital, as well     Prior Function:  Level of [place]: Independent with ADLs and functional transfers, Independent with homemaking with ambulation  Receives Help From: Family  ADL Assistance: Independent  Homemaking Assistance: Independent  Ambulatory Assistance: Independent  Vocational: Retired  Leisure: enjoys gardening  Hand Dominance: Right  Prior Function Comments: no fall hx    ADL:  Eating Assistance: Independent  Grooming Assistance: Stand by  Grooming Deficit: Setup  Bathing Assistance: Stand by  Bathing Deficit: Steadying (intermittently)  UE Dressing Assistance: Stand by  UE Dressing Deficit: Setup  LE Dressing Assistance: Minimal  LE Dressing Deficit: Steadying  Toileting Assistance with Device: Stand by  Toileting Deficit: Supervison/safety, Steadying    Activity Tolerance:  Endurance:  "Decreased tolerance for upright activites  Activity Tolerance Comments: slightly SOB after > household distance, reports legs feel \"a little weak\"    Bed Mobility/Transfers:   Bed Mobility  Bed Mobility: No    Transfers  Transfer: Yes  Transfer 1  Technique 1: Sit to stand, Stand to sit  Transfer Level of Assistance 1: Independent  Trials/Comments 1: STS to/from bedside chair x2 trials without a device, IND level    Functional Mobility:  Functional Mobility  Functional Mobility Performed: Yes  Functional Mobility 1  Surface 1: Level tile  Device 1: No device  Assistance 1: Contact guard, Close supervision  Comments 1: ambulates > household distance without a device, slowed kimberly.  intermittently  requires CGA for stability as fatigue increases    Vision: Vision - Basic Assessment  Current Vision: Wears glasses all the time  Sensation:  Light Touch: No apparent deficits (denies numbness/tingling)  Strength:  Strength Comments: BUEs >/= 4/5 grossly  Hand Function:  Hand Function  Gross Grasp: Functional  Coordination: Functional  Extremities: RUE   RUE : Within Functional Limits and LUE   LUE: Within Functional Limits    Outcome Measures: [facility] Daily Activity  Putting on and taking off regular lower body clothing: A little  Bathing (including washing, rinsing, drying): A little  Putting on and taking off regular upper body clothing: A little  Toileting, which includes using toilet, bedpan or urinal: A little  Taking care of personal grooming such as brushing teeth: A little  Eating Meals: None  Daily Activity - Total Score: 19      Education Documentation  Body Mechanics, taught by Benjamin Simon OT at 6/24/2024  8:27 AM.  Learner: Patient  Readiness: Acceptance  Method: Explanation  Response: Needs Reinforcement, Verbalizes Understanding    Precautions, taught by Benjamin Simon OT at 6/24/2024  8:27 AM.  Learner: Patient  Readiness: Acceptance  Method: Explanation  Response: Needs Reinforcement, Verbalizes " Understanding    Education Comments  No comments found.      Goals:   Encounter Problems       Encounter Problems (Active)       OT Goals       ADLs (Progressing)       Start:  06/24/24    Expected End:  07/08/24       Patient will complete ADLs with MOD I using AE/compensatory techniques as needed.          Functional Mobility (Progressing)       Start:  06/24/24    Expected End:  07/08/24       Patient will complete household distance mobility with MOD I using LRD.          UE Strengthening (Progressing)       Start:  06/24/24    Expected End:  07/08/24       Patient will complete UE HEP independently to improve overall strength/activity tolerance in preparation for ADLS.

## 2024-06-24 NOTE — CARE PLAN
Problem: Respiratory  Goal: Minimal/no exertional discomfort or dyspnea this shift  Outcome: Progressing  Goal: No signs of respiratory distress (eg. Use of accessory muscles. Peds grunting)  Outcome: Progressing  Goal: Patent airway maintained this shift  Outcome: Progressing  Goal: Verbalize decreased shortness of breath this shift  Outcome: Progressing  Goal: Wean oxygen to maintain O2 saturation per order/standard this shift  Outcome: Progressing     Problem: Pain - Adult  Goal: Verbalizes/displays adequate comfort level or baseline comfort level  Outcome: Progressing     Problem: Safety - Adult  Goal: Free from fall injury  Outcome: Progressing     Problem: Discharge Planning  Goal: Discharge to home or other facility with appropriate resources  Outcome: Progressing     Problem: Chronic Conditions and Co-morbidities  Goal: Patient's chronic conditions and co-morbidity symptoms are monitored and maintained or improved  Outcome: Progressing     Problem: Diabetes  Goal: Maintain electrolyte levels within acceptable range throughout shift  Outcome: Progressing  Goal: Maintain glucose levels >70mg/dl to <250mg/dl throughout shift  Outcome: Progressing  Goal: No changes in neurological exam by end of shift  Outcome: Progressing  Goal: Vital signs within normal range for age by end of shift  Outcome: Progressing     Problem: Heart Failure  Goal: Improved gas exchange this shift  Outcome: Progressing  Goal: Improved urinary output this shift  Outcome: Progressing  Goal: Reduction in peripheral edema within 24 hours  Outcome: Progressing  Goal: Report improvement of dyspnea/breathlessness this shift  Outcome: Progressing  Goal: Weight from fluid excess reduced over 2-3 days, then stabilize  Outcome: Progressing  Goal: Increase self care and/or family involvement in 24 hours  Outcome: Progressing   The patient's goals for the shift include      The clinical goals for the shift include Improve breathing    Over the  shift, the patient did not make progress toward the following goals. Barriers to progression include . Recommendations to address these barriers include .

## 2024-06-24 NOTE — PROGRESS NOTES
Herminio Devlin is a 84 y.o. male on day 1 of admission presenting with Acute heart failure, unspecified heart failure type (Multi).      Subjective   Reports improvement in shortness of breath  Denies chest pain     Objective     Last Recorded Vitals  /56 (BP Location: Left arm, Patient Position: Lying)   Pulse 97 Comment: s/p > household distance mobility  Temp 36.3 °C (97.3 °F) (Temporal)   Resp 14   Wt 81.5 kg (179 lb 10.8 oz)   SpO2 98%   Intake/Output last 3 Shifts:    Intake/Output Summary (Last 24 hours) at 6/24/2024 1058  Last data filed at 6/23/2024 1300  Gross per 24 hour   Intake 150 ml   Output 200 ml   Net -50 ml       Admission Weight  Weight: 75.9 kg (167 lb 5.3 oz) (06/22/24 2149)    Daily Weight  06/24/24 : 81.5 kg (179 lb 10.8 oz)    Image Results  ECG 12 Lead  Sinus tachycardia  Possible Left atrial enlargement  Nonspecific ST abnormality  Abnormal ECG  When compared with ECG of 15-LUCILA-2024 15:05,  Nonspecific T wave abnormality no longer evident in Inferior leads  Nonspecific T wave abnormality, improved in Anterolateral leads      Physical Exam  Eyes: PERRL, EOMI,   ENMT: mucous membranes moist  Head/Neck: Neck supple, No JVD,   Lungs: Diminished breath sounds bilaterally  Cardiovascular: Regular, rate and rhythm  Gastrointestinal: Soft, non-distended, +BS.  Musculoskeletal: ROM intact, no joint swelling, normal strength  Extremities: peripheral pulses intact; no edema  Neurological: Alert and Oriented x 3; no focal deficits; gross motor and sensation intact; CN II-XII intact. No asterixis.       Assessment/Plan     Acute decompensated heart failure  Elevated troponin  Hypokalemia  Moderate aortic stenosis  Type 2 diabetes mellitus with peripheral neuropathy  CAD CABG, peripheral vascular disease status post stent  Carotid artery disease status post right CEA  Duodenal diverticulum  Anemia, recent GI bleeding-EGD on June 1724 which showed a healed prepyloric shallow  ulcer    Plan:  Continue IV Lasix, fluid restriction  Consult cardiology  Replete potassium  DVT prophylaxis       Eladia Brody MD

## 2024-06-24 NOTE — PROGRESS NOTES
06/24/24 1408   Discharge Planning   Living Arrangements Spouse/significant other  (Sp[place] currently [place] [facility]   Support Systems Spouse/significant other;Children   Assistance Needed None   Type of Residence Private residence   Home or Post Acute Services In home services   Type of Home Care Services Home PT;Home OT;Home nursing visits   Patient expects to be discharged to: Home with [facility].  He was set up with [facility] after discharge from [facility] 6/18/24.   He will need an EXTERNAL home care referral at discharge.   Does the patient need discharge transport arranged? No   Financial Resource Strain   How hard is it for you to pay for the very basics like food, housing, medical care, and heating? Not very   Housing Stability   In the last 12 months, was there a time when you were not able to pay the mortgage or rent on time? N   In the last 12 months, how many places have you lived? 1   In the last 12 months, was there a time when you did not have a steady place to sleep or slept in a shelter (including now)? N   Transportation Needs   In the past 12 months, has lack of transportation kept you from medical appointments or from getting medications? no   In the past 12 months, has lack of transportation kept you from meetings, work, or from getting things needed for daily living? No   Patient Choice   Provider Choice list and CMS website (https://medicare.gov/care-compare#search) for post-acute Quality and Resource Measure Data were provided and reviewed with: Patient   Patient / Family choosing to utilize agency / facility established prior to hospitalization Yes     Referral sent to [facility] via [facility].  Awaiting updates.

## 2024-06-25 ENCOUNTER — APPOINTMENT (OUTPATIENT)
Dept: CARDIOLOGY | Facility: HOSPITAL | Age: 84
DRG: 280 | End: 2024-06-25
Payer: MEDICARE

## 2024-06-25 ENCOUNTER — PHARMACY VISIT (OUTPATIENT)
Dept: PHARMACY | Facility: CLINIC | Age: 84
End: 2024-06-25
Payer: MEDICARE

## 2024-06-25 ENCOUNTER — APPOINTMENT (OUTPATIENT)
Dept: PRIMARY CARE | Facility: CLINIC | Age: 84
End: 2024-06-25
Payer: MEDICARE

## 2024-06-25 ENCOUNTER — PATIENT OUTREACH (OUTPATIENT)
Dept: CASE MANAGEMENT | Facility: HOSPITAL | Age: 84
End: 2024-06-25

## 2024-06-25 VITALS
DIASTOLIC BLOOD PRESSURE: 78 MMHG | WEIGHT: 179.68 LBS | BODY MASS INDEX: 29.94 KG/M2 | HEART RATE: 72 BPM | TEMPERATURE: 96.4 F | RESPIRATION RATE: 21 BRPM | HEIGHT: 65 IN | SYSTOLIC BLOOD PRESSURE: 123 MMHG | OXYGEN SATURATION: 95 %

## 2024-06-25 LAB
ANION GAP SERPL CALC-SCNC: 11 MMOL/L
BUN SERPL-MCNC: 18 MG/DL (ref 8–25)
CALCIUM SERPL-MCNC: 8.6 MG/DL (ref 8.5–10.4)
CHLORIDE SERPL-SCNC: 104 MMOL/L (ref 97–107)
CO2 SERPL-SCNC: 23 MMOL/L (ref 24–31)
CREAT SERPL-MCNC: 1 MG/DL (ref 0.4–1.6)
EGFRCR SERPLBLD CKD-EPI 2021: 75 ML/MIN/1.73M*2
GLUCOSE BLD MANUAL STRIP-MCNC: 109 MG/DL (ref 74–99)
GLUCOSE SERPL-MCNC: 101 MG/DL (ref 65–99)
POTASSIUM SERPL-SCNC: 3.9 MMOL/L (ref 3.4–5.1)
SODIUM SERPL-SCNC: 138 MMOL/L (ref 133–145)

## 2024-06-25 PROCEDURE — 2500000001 HC RX 250 WO HCPCS SELF ADMINISTERED DRUGS (ALT 637 FOR MEDICARE OP): Performed by: INTERNAL MEDICINE

## 2024-06-25 PROCEDURE — 93010 ELECTROCARDIOGRAM REPORT: CPT | Performed by: INTERNAL MEDICINE

## 2024-06-25 PROCEDURE — 99232 SBSQ HOSP IP/OBS MODERATE 35: CPT | Performed by: INTERNAL MEDICINE

## 2024-06-25 PROCEDURE — 2500000004 HC RX 250 GENERAL PHARMACY W/ HCPCS (ALT 636 FOR OP/ED): Performed by: FAMILY MEDICINE

## 2024-06-25 PROCEDURE — 36415 COLL VENOUS BLD VENIPUNCTURE: CPT | Performed by: INTERNAL MEDICINE

## 2024-06-25 PROCEDURE — 2500000004 HC RX 250 GENERAL PHARMACY W/ HCPCS (ALT 636 FOR OP/ED)

## 2024-06-25 PROCEDURE — 93005 ELECTROCARDIOGRAM TRACING: CPT

## 2024-06-25 PROCEDURE — 82947 ASSAY GLUCOSE BLOOD QUANT: CPT

## 2024-06-25 PROCEDURE — 80048 BASIC METABOLIC PNL TOTAL CA: CPT | Performed by: INTERNAL MEDICINE

## 2024-06-25 PROCEDURE — RXMED WILLOW AMBULATORY MEDICATION CHARGE

## 2024-06-25 PROCEDURE — 2500000002 HC RX 250 W HCPCS SELF ADMINISTERED DRUGS (ALT 637 FOR MEDICARE OP, ALT 636 FOR OP/ED): Performed by: INTERNAL MEDICINE

## 2024-06-25 RX ORDER — AMLODIPINE BESYLATE 5 MG/1
5 TABLET ORAL DAILY
Qty: 30 TABLET | Refills: 3 | Status: SHIPPED | OUTPATIENT
Start: 2024-06-25 | End: 2024-06-27 | Stop reason: SDUPTHER

## 2024-06-25 RX ORDER — PANTOPRAZOLE SODIUM 40 MG/1
40 TABLET, DELAYED RELEASE ORAL 2 TIMES DAILY
Qty: 60 TABLET | Refills: 1 | Status: SHIPPED | OUTPATIENT
Start: 2024-06-25 | End: 2024-06-27 | Stop reason: SDUPTHER

## 2024-06-25 RX ORDER — POTASSIUM CHLORIDE 20 MEQ/1
20 TABLET, EXTENDED RELEASE ORAL DAILY
Qty: 30 TABLET | Refills: 2 | Status: SHIPPED | OUTPATIENT
Start: 2024-06-25 | End: 2024-06-27 | Stop reason: SDUPTHER

## 2024-06-25 RX ORDER — ROSUVASTATIN CALCIUM 40 MG/1
40 TABLET, COATED ORAL NIGHTLY
Qty: 30 TABLET | Refills: 3 | Status: SHIPPED | OUTPATIENT
Start: 2024-06-25 | End: 2024-06-27 | Stop reason: SDUPTHER

## 2024-06-25 RX ORDER — FUROSEMIDE 40 MG/1
40 TABLET ORAL DAILY
Qty: 30 TABLET | Refills: 3 | Status: SHIPPED | OUTPATIENT
Start: 2024-06-25 | End: 2024-06-27 | Stop reason: SDUPTHER

## 2024-06-25 RX ORDER — METOPROLOL SUCCINATE 100 MG/1
100 TABLET, EXTENDED RELEASE ORAL DAILY
Qty: 30 TABLET | Refills: 3 | Status: SHIPPED | OUTPATIENT
Start: 2024-06-25 | End: 2024-06-27 | Stop reason: SDUPTHER

## 2024-06-25 RX ADMIN — FUROSEMIDE 40 MG: 40 TABLET ORAL at 11:47

## 2024-06-25 RX ADMIN — METOPROLOL SUCCINATE 100 MG: 100 TABLET, EXTENDED RELEASE ORAL at 11:48

## 2024-06-25 RX ADMIN — SUCRALFATE 1 G: 1 TABLET ORAL at 06:25

## 2024-06-25 RX ADMIN — PANTOPRAZOLE SODIUM 40 MG: 40 TABLET, DELAYED RELEASE ORAL at 11:48

## 2024-06-25 RX ADMIN — POTASSIUM CHLORIDE 20 MEQ: 1500 TABLET, EXTENDED RELEASE ORAL at 11:48

## 2024-06-25 RX ADMIN — IRON SUCROSE 200 MG: 20 INJECTION, SOLUTION INTRAVENOUS at 06:26

## 2024-06-25 RX ADMIN — LORATADINE 10 MG: 10 TABLET ORAL at 11:47

## 2024-06-25 RX ADMIN — FERROUS SULFATE TAB 325 MG (65 MG ELEMENTAL FE) 1 TABLET: 325 (65 FE) TAB at 11:47

## 2024-06-25 RX ADMIN — GABAPENTIN 600 MG: 600 TABLET, FILM COATED ORAL at 06:25

## 2024-06-25 RX ADMIN — ALLOPURINOL 300 MG: 300 TABLET ORAL at 11:48

## 2024-06-25 RX ADMIN — AMLODIPINE BESYLATE 5 MG: 5 TABLET ORAL at 11:48

## 2024-06-25 ASSESSMENT — COGNITIVE AND FUNCTIONAL STATUS - GENERAL
MOBILITY SCORE: 24
DAILY ACTIVITIY SCORE: 24

## 2024-06-25 ASSESSMENT — PAIN - FUNCTIONAL ASSESSMENT
PAIN_FUNCTIONAL_ASSESSMENT: 0-10
PAIN_FUNCTIONAL_ASSESSMENT: 0-10

## 2024-06-25 ASSESSMENT — PAIN SCALES - GENERAL
PAINLEVEL_OUTOF10: 0 - NO PAIN
PAINLEVEL_OUTOF10: 0 - NO PAIN

## 2024-06-25 NOTE — DISCHARGE SUMMARY
Discharge Diagnosis  Acute diastolic congestive heart failure (Multi)    Issues Requiring Follow-Up  Heart failure navigator, healthy at home program, home health care and follow-up with Dr. Yeboah cardiologist in 1 week for consideration for heart catheterization as well as timing of resumption of antiplatelet therapy she is currently on hold due to recent upper gastrointestinal bleeding    Discharge Meds     Your medication list        START taking these medications        Instructions Last Dose Given Next Dose Due   amLODIPine 5 mg tablet  Commonly known as: Norvasc      Take 1 tablet (5 mg) by mouth once daily.       furosemide 40 mg tablet  Commonly known as: Lasix      Take 1 tablet (40 mg) by mouth once daily.       metoprolol succinate  mg 24 hr tablet  Commonly known as: Toprol-XL      Take 1 tablet (100 mg) by mouth once daily. Do not crush or chew.       potassium chloride CR 20 mEq ER tablet  Commonly known as: Klor-Con M20      Take 1 tablet (20 mEq) by mouth once daily. Do not crush or chew.       rosuvastatin 40 mg tablet  Commonly known as: Crestor      Take 1 tablet (40 mg) by mouth once daily at bedtime.              CHANGE how you take these medications        Instructions Last Dose Given Next Dose Due   pantoprazole 40 mg EC tablet  Commonly known as: ProtoNix  What changed: when to take this      Take 1 tablet (40 mg) by mouth 2 times a day. Do not crush, chew, or split.              CONTINUE taking these medications        Instructions Last Dose Given Next Dose Due   acetaminophen 325 mg tablet  Commonly known as: TylenoL      Take 1-2 tablets (325-650 mg) by mouth every 8 hours if needed for mild pain (1 - 3). every 4-6 hours as needed       allopurinol 300 mg tablet  Commonly known as: Zyloprim      Take 1 tablet (300 mg) by mouth once daily.       ferrous sulfate 325 (65 Fe) MG EC tablet      Take 1 tablet by mouth once daily with breakfast. Do not crush, chew, or split.        gabapentin 600 mg tablet  Commonly known as: Neurontin      Take 1 tablet (600 mg) by mouth 3 times a day.       sucralfate 1 gram tablet  Commonly known as: Carafate      Take 1 tablet (1 g) by mouth 4 times a day. Take 1 hour before meals and at bedtime              STOP taking these medications      metoprolol tartrate 50 mg tablet  Commonly known as: Lopressor                  Where to Get Your Medications        These medications were sent to [facility]  [address], [address], [place] [facility] [address]      Hours: 9 AM to 6 PM Mon-Fri, 9 AM to 1 PM Sat Phone: [phone]   amLODIPine 5 mg tablet  furosemide 40 mg tablet  metoprolol succinate  mg 24 hr tablet  pantoprazole 40 mg EC tablet  potassium chloride CR 20 mEq ER tablet  rosuvastatin 40 mg tablet         Test Results Pending At Discharge  Pending Labs       No current pending labs.            Hospital Course   84-year-old  male with known coronary artery disease and diastolic congestive heart failure multiple admissions for exacerbation of congestive heart failure he was hospitalized recently with an upper gastrointestinal bleed secondary to antiplatelet therapy treated with Carafate and Protonix he returned home presented with chest pressure and shortness of breath was found to have evidence of a type II myocardial infarction and acute decompensated diastolic congestive heart failure he was admitted to the hospital evaluated by cardiology diuresed appropriately return to his usual baseline and cleared for discharge by Dr. Chavarria covering for Dr. Yeboah's have outpatient follow-up with Dr. Yeboah in 1 week's time for consideration of left heart catheterization and resumption of his antiplatelet therapies to complete double dose proton pump inhibitor through July 18 along with Carafate therapy.  He is euvolemic without evidence of congestive heart failure at this time arrangements are made for home health care  healthy at home program for heart failure monitoring and heart failure navigator program    Pertinent Physical Exam At Time of Discharge  Physical Exam  Vitals and nursing note reviewed.   Constitutional:       Appearance: Normal appearance.   HENT:      Head: Atraumatic.      Mouth/Throat:      Mouth: Mucous membranes are moist.   Eyes:      Extraocular Movements: Extraocular movements intact.   Cardiovascular:      Rate and Rhythm: Normal rate and regular rhythm.      Pulses: Normal pulses.      Heart sounds: Normal heart sounds.   Pulmonary:      Effort: Pulmonary effort is normal.      Breath sounds: Normal breath sounds.   Abdominal:      Palpations: Abdomen is soft.   Musculoskeletal:         General: Normal range of motion.      Cervical back: Normal range of motion and neck supple.   Skin:     General: Skin is warm.      Capillary Refill: Capillary refill takes less than 2 seconds.   Neurological:      General: No focal deficit present.      Mental Status: He is oriented to person, place, and time. Mental status is at baseline.   Psychiatric:         Mood and Affect: Mood normal.     Transthoracic Echo (TTE) Complete    Result Date: 3/11/2024   [facility], [address] [place] [address]              Tel [phone] and Fax [phone] TRANSTHORACIC ECHOCARDIOGRAM REPORT  Patient Name:      FOX Yeh Physician:    [id number] Garrison Chavarria MD Study Date:        3/11/2024            Ordering Provider:    [id number] FOX MITCHELL MRN/PID:           [id number]             Fellow: Accession#:        [id number]         Nurse: Date of Birth/Age: 1940 / 83 years Sonographer:          Nahomy Salazar RDCS Gender:            M                    Additional Staff: Height:            167.64 cm             Admit Date: Weight:            83.91 kg             Admission Status:     Outpatient BSA / BMI:         1.93 m2 / 29.86      Encounter#:           [id number]                    kg/m2                                         Department Location:  [facility] Blood Pressure: 134 /57 mmHg Study Type:    TRANSTHORACIC ECHO (TTE) COMPLETE Diagnosis/ICD: Nonrheumatic aortic (valve) stenosis-I35.0 Indication:    Aortic stenosis CPT Code:      Echo Complete w Full [id number] Patient History: Diabetes:          Yes Pertinent History: HTN and Hyperlipidemia. CAD, s/p CABG 3/20/15; Aortic                    stenosis; Gout. Study Detail: The following Echo studies were performed: 2D, M-Mode, Doppler and               color flow. Technically challenging study due to body habitus.  PHYSICIAN INTERPRETATION: Left Ventricle: The left ventricular systolic function is normal, with an estimated ejection fraction of 60-65%. The calculated ejection fraction is normal at 66 % using the Castellanos's Bi-plane MOD calculation. There are no regional wall motion abnormalities. The left ventricular cavity size is normal. Abnormal (paradoxical) septal motion consistent with post-operative status. Spectral Doppler shows an impaired relaxation pattern of left ventricular diastolic filling. Left Atrium: The left atrium is mildly dilated. Right Ventricle: The right ventricle is normal in size. There is normal right ventriclar wall thickness. There is normal right ventricular global systolic function. Right Atrium: The right atrium is normal in size. Aortic Valve: The aortic valve is trileaflet. There is moderate aortic valve cusp calcification. There is There is reduced systolic aortic valve leaflet excursion. There is evidence of mild to moderate aortic valve stenosis. There is no evidence of aortic valve regurgitation. The peak instantaneous gradient of the aortic valve is 25.8 mmHg. The mean gradient of the aortic valve is 14.6 mmHg.  Mitral Valve: The mitral valve is normal in structure. There is normal mitral valve leaflet mobility. There is mild mitral annular calcification. There is no evidence of mitral valve regurgitation. Tricuspid Valve: The tricuspid valve is structurally normal. There is normal tricuspid valve leaflet mobility. There is trace to mild tricuspid regurgitation. Pulmonic Valve: The pulmonic valve is structurally normal. There is trace pulmonic valve regurgitation. Pericardium: There is no pericardial effusion noted. There is a pericardial fat pad present. Aorta: The aortic root is normal. The Ao Sinus is 3.40 cm. The Asc Ao is 3.30 cm. There is no dilatation of the aortic arch. There is no dilatation of the ascending aorta. There is no dilatation of the aortic root. There is plaque visualized in the ascending aorta, which is classified as a Grade 2 [mild (focal or diffuse) intimal thickening of 2-3 mm] atherosclerosis. Pulmonary Artery: The pulmonary artery is normal in size. The estimated pulmonary artery pressure is normal. Systemic Veins: The inferior vena cava appears to be of normal size. There is IVC inspiratory collapse greater than 50%.  CONCLUSIONS:  1. Left ventricular systolic function is normal with a 60-65% estimated ejection fraction.  2. Abnormal septal motion consistent with post-operative status.  3. Spectral Doppler shows an impaired relaxation pattern of left ventricular diastolic filling.  4. Mild to moderate aortic valve stenosis.  5. There is moderate aortic valve cusp calcification.  6. The peak instantaneous gradient of the aortic valve is 25.8 mmHg.  7. There is plaque visualized in the ascending aorta. QUANTITATIVE DATA SUMMARY: 2D MEASUREMENTS:                          Normal Ranges: IVSd:          1.05 cm   (0.6-1.1cm) LVPWd:         0.92 cm   (0.6-1.1cm) LVIDd:         4.18 cm   (3.9-5.9cm) LVIDs:         2.94 cm LV Mass Index: 68.9 g/m2 LV % FS        29.6 % LA VOLUME:                                Normal Ranges: LA Vol A4C:        51.0 ml    (22+/-6mL/m2) LA Vol A2C:        63.3 ml LA Vol BP:         60.2 ml LA Vol Index A4C:  26.4 ml/m2 LA Vol Index A2C:  32.7 ml/m2 LA Vol Index BP:   31.1 ml/m2 LA Area A4C:       19.6 cm2 LA Area A2C:       20.6 cm2 LA Major Axis A4C: 6.4 cm LA Major Axis A2C: 5.7 cm LA Vol A4C:        50.4 ml LA Vol A2C:        60.4 ml RA VOLUME BY A/L METHOD:                               Normal Ranges: RA Vol A4C:        33.4 ml    (8.3-19.5ml) RA Vol Index A4C:  17.3 ml/m2 RA Area A4C:       15.1 cm2 RA Major Axis A4C: 5.8 cm M-MODE MEASUREMENTS:                  Normal Ranges: Ao Root: 3.30 cm (2.0-3.7cm) LAs:     4.40 cm (2.7-4.0cm) AORTA MEASUREMENTS:                      Normal Ranges: Ao Sinus, d: 3.40 cm (2.1-3.5cm) Asc Ao, d:   3.30 cm (2.1-3.4cm) LV SYSTOLIC FUNCTION BY 2D PLANIMETRY (MOD):                     Normal Ranges: EF-A4C View: 67.5 % (>=55%) EF-A2C View: 63.2 % EF-Biplane:  66.2 % LV DIASTOLIC FUNCTION:                               Normal Ranges: MV Peak E:        0.67 m/s    (0.7-1.2 m/s) MV Peak A:        1.09 m/s    (0.42-0.7 m/s) E/A Ratio:        0.61        (1.0-2.2) MV e'             0.06 m/s    (>8.0) MV lateral e'     0.08 m/s MV medial e'      0.05 m/s MV A Dur:         144.62 msec E/e' Ratio:       10.25       (<8.0) PulmV Sys Angel:    52.30 cm/s PulmV Summers Angel:   44.86 cm/s PulmV S/D Angel:    1.17 PulmV A Revs Angel: 21.68 cm/s PulmV A Revs Dur: 125.59 msec MITRAL VALVE:                 Normal Ranges: MV DT: 222 msec (150-240msec) AORTIC VALVE:                                    Normal Ranges: AoV Vmax:                2.54 m/s  (<=1.7m/s) AoV Peak P.8 mmHg (<20mmHg) AoV Mean P.6 mmHg (1.7-11.5mmHg) LVOT Max Angel:            1.05 m/s  (<=1.1m/s) AoV VTI:                 56.14 cm  (18-25cm) LVOT VTI:                22.99 cm LVOT Diameter:           2.15 cm   (1.8-2.4cm) AoV Area, VTI:           1.48 cm2  (2.5-5.5cm2) AoV  Area,Vmax:           1.50 cm2  (2.5-4.5cm2) AoV Dimensionless Index: 0.41  RIGHT VENTRICLE: RV Basal 3.90 cm RV Mid   3.10 cm RV Major 6.5 cm TAPSE:   13.6 mm RV s'    0.07 m/s TRICUSPID VALVE/RVSP:                   Normal Ranges: IVC Diam: 1.50 cm PULMONIC VALVE:                         Normal Ranges: PV Accel Time: 88 msec  (>120ms) PV Max Angel:    1.1 m/s  (0.6-0.9m/s) PV Max P.7 mmHg Pulmonary Veins: PulmV A Revs Dur: 125.59 msec PulmV A Revs Angel: 21.68 cm/s PulmV Summers Angel:   44.86 cm/s PulmV S/D Angel:    1.17 PulmV Sys Angel:    52.30 cm/s  [id number] Garrison Chavarria MD Electronically signed on 3/11/2024 at 5:32:43 PM  ** Final **       Outpatient Follow-Up  Future Appointments   Date Time Provider Department Center   2024 11:30 AM Donald Price MD [id number] [facility]   2024  1:15 PM Herminio Yeboah MD [id number] [facility]   2024  1:45 PM Herminio Yeboah MD [id number] [facility]   2024  2:30 PM Donald Price MD [id number] [facility]         Herminio Shepherd DO

## 2024-06-25 NOTE — CARE PLAN
The patient's goals for the shift include  Discharge    The clinical goals for the shift include  discharge    Over the shift, the patient did not make progress toward the following goals.     Barriers to progression include cardiology input    Recommendations to address these barriers include seen by cardiology which ok'd discharge - f/up with Dr Yeboah in 1 week as well as PCP  1 week. Medication changes as ordered which were reviewed with patient  - CHF nurse in and appropriate follow up after discharge scheduled -

## 2024-06-25 NOTE — PROGRESS NOTES
Subjective Data:      Overnight Events:         Objective Data:  Last Recorded Vitals:  Vitals:    06/25/24 0505 06/25/24 0506 06/25/24 0507 06/25/24 0820   BP:   130/53 123/78   BP Location:    Right arm   Patient Position:    Sitting   Pulse: 66 64 65 72   Resp: 13 17 16 21   Temp:    35.8 °C (96.4 °F)   TempSrc:    Temporal   SpO2: 97%  96% 95%   Weight:       Height:           Last Labs:  CBC - 6/23/2024:  5:50 AM  14.4 9.1 329    28.6      CMP - 6/25/2024:  5:21 AM  8.6 6.6 49 --- 0.3   _ 3.6 18 67      PTT - 6/15/2024:  6:14 PM  1.1   11.2 64.2     HGBA1C   Date/Time Value Ref Range Status   05/13/2024 08:15 AM 5.7 See below % Final   09/25/2023 08:08 AM 5.7 4.0 - 6.0 % Final     Comment:     Hemoglobin A1C levels are related to mean blood glucose during the   preceding 2-3 months. The relationship table below may be used as a   general guide. Each 1% increase in HGB A1C is a reflection of an   increase in mean glucose of approximately 30 mg/dl.   Reference: Diabetes Care, volume 29, supplement 1 Jan. 2006                        HGB A1C ................. Approx. Mean Glucose   _______________________________________________   6%   ...............................  120 mg/dl   7%   ...............................  150 mg/dl   8%   ...............................  180 mg/dl   9%   ...............................  210 mg/dl   10%  ...............................  240 mg/dl  Performed at [address] [address]     03/20/2023 08:45 AM 5.9 4.0 - 6.0 % Final     Comment:     Hemoglobin A1C levels are related to mean blood glucose during the   preceding 2-3 months. The relationship table below may be used as a   general guide. Each 1% increase in HGB A1C is a reflection of an   increase in mean glucose of approximately 30 mg/dl.   Reference: Diabetes Care, volume 29, supplement 1 Jan. 2006                        HGB A1C ................. Approx. Mean Glucose    _______________________________________________   6%   ...............................  120 mg/dl   7%   ...............................  150 mg/dl   8%   ...............................  180 mg/dl   9%   ...............................  210 mg/dl   10%  ...............................  240 mg/dl  Performed at [address] [address]     07/27/2020 11:42 AM 5.9 4.3 - 5.6 % Final     Comment:     American Diabetes Association guidelines indicate that patients with HgbA1c in   the range 5.7-6.4% are at increased risk for development of diabetes, and   intervention by lifestyle modification may be beneficial. HgbA1c greater or   equal to 6.5% is considered diagnostic of diabetes.     LDLCALC   Date/Time Value Ref Range Status   05/13/2024 08:15 AM 18 65 - 130 mg/dL Final   09/25/2023 08:08 AM 4 65 - 130 MG/DL Final   03/20/2023 08:45 AM 9 65 - 130 MG/DL Final   09/15/2022 08:43 AM 10 65 - 130 MG/DL Final     VLDL   Date/Time Value Ref Range Status   11/04/2020 08:20 AM 37 0 - 40 mg/dL Final   08/04/2020 08:15 AM 46 0 - 40 mg/dL Final   05/28/2020 08:13 AM 35 0 - 40 mg/dL Final      Last I/O:  I/O last 3 completed shifts:  In: 200 (2.5 mL/kg) [P.O.:200]  Out: 475 (5.8 mL/kg) [Urine:475 (0.2 mL/kg/hr)]  Weight: 81.5 kg     Past Cardiology Tests (Last 3 Years):  EKG:  ECG 12 Lead 06/25/2024 (Preliminary)      ECG 12 Lead 06/22/2024      ECG 12 Lead 06/15/2024      ECG 12 lead 06/15/2024    Echo:  Transthoracic Echo (TTE) Complete 03/11/2024    Ejection Fractions:  EF   Date/Time Value Ref Range Status   03/11/2024 01:41 PM 66 %      Cath:  No results found for this or any previous visit from the past 1095 days.    Stress Test:  No results found for this or any previous visit from the past 1095 days.    Cardiac Imaging:  No results found for this or any previous visit from the past 1095 days.      Inpatient Medications:  Scheduled medications   Medication Dose Route Frequency    allopurinol  300 mg  oral Daily    amLODIPine  5 mg oral Daily    ferrous sulfate (325 mg ferrous sulfate)  65 mg of iron oral Daily    furosemide  40 mg oral Daily    gabapentin  600 mg oral q8h    loratadine  10 mg oral Daily    metoprolol succinate XL  100 mg oral Daily    pantoprazole  40 mg oral BID    potassium chloride CR  20 mEq oral Daily    rosuvastatin  40 mg oral Nightly    sucralfate  1 g oral 4x daily     PRN medications   Medication    acetaminophen    Or    acetaminophen    Or    acetaminophen    benzocaine-menthol    dextromethorphan-guaifenesin    guaiFENesin    ondansetron ODT    Or    ondansetron    polyethylene glycol     Continuous Medications   Medication Dose Last Rate       Physical Exam:       Assessment/Plan   6/23: The patient is a 84-year-old white male with an extensive past medical history outlined above including hypertension hyperlipidemia coronary artery disease remote PCI atherectomy stent deployment to the LCx in 2005, CABG x 4 in 2015, carotid vascular disease with right carotid endarterectomy, peripheral arterial disease with bilateral kissing stents to high-grade common iliac artery stenoses in 2015. Patient also has a history of mild to moderate aortic valve stenosis. Patient recently recently admitted with nausea vomiting and hematemesis identified to have a small benign ulcer on EGD. Patient discharged but returns with complaining of some shortness of breath. High-sensitivity troponins are slightly elevated. EKG shows sinus tachycardia with a nonspecific ST abnormality. Patient will be restarted on Toprol- mg daily plus amlodipine 5 mg daily. Hesitate to use an ARB given the fact that he does have renal artery stenoses. He will be started on Lasix 40 mg daily for mild CHF. His echocardiogram performed earlier this year in 3/2024 showed a preserved LV ejection fraction with mild to moderate aortic valvular stenosis. Patient was started on intravenous iron to expedite recovery of blood loss  anemia. Patient's antiplatelet therapy on temporary hold due to the recent bleeding ulcer.      6/24: The patient remains clinically improved.  Breathing has returned to normal without any associated chest discomfort.  The patient's repeat high-sensitivity troponins remain elevated at 788 and 766 consistent with non-STEMI presumably type II from demand ischemia.  Will need to discuss with his usual cardiologist Dr. Yeboah upon his return whether or not a repeat catheterization is required at some point in near future.  As noted he did have a repeat catheterization in 2021 which showed closure of both saphenous vein graft to the 2 marginal branches of the LCx but with patent saphenous vein graft to RCA and LIMA to LAD.  The patient's antiplatelet therapy remains on hold due to recent upper GI bleed and documented ulcer.  The patient will remain on Lasix 40 mg daily as a maintenance medication.  Blood pressure readings appear to be acceptable in the 130-140 mmHg range on the Toprol- mg daily amlodipine 5 mg daily.  The basic metabolic panel remains in range with creatinine 1.0 but potassium needs supplementation at 3.1.    6/25: The patient is resting comfortably not short of breath lying flat in bed.  Despite the elevated troponins his EKG tracings remain unchanged and essentially unremarkable.  He denies any unusual shortness of breath chest is clear.  Systolic blood pressures are in the 120-130 mmHg range.  Electrolyte panel unchanged unremarkable creatinine 1.0 potassium 3.9.  Patient does require potassium supplement with his Lasix.  Patient prepared for discharge today on current cardiac therapy that includes amlodipine 5 mg daily Toprol- mg daily Lasix 40 mg daily Klor-Con 20 mEq daily rosuvastatin 40 mg daily.  His dual antiplatelet therapy of aspirin Plavix temporarily on hold due to recent ulcer with GI bleeding.  Patient needs to remain on the Protonix and iron supplementation as currently  prescribed.  Patient will be seen by his primary cardiologist  Coming in within the next 1 week with respect to ongoing potential for decision regarding repeat cardiac cath.      Peripheral IV 06/22/24 20 G Right Antecubital (Active)   Site Assessment Clean;Dry;Intact 06/24/24 2040   Dressing Status Clean;Dry;Occlusive 06/24/24 2040   Number of days: 3       Code Status:  Full Code    I spent 20 minutes in the professional and overall care of this patient.        Garrison Chavarria MD

## 2024-06-25 NOTE — PROGRESS NOTES
Spiritual Care Visit    Clinical Encounter Type  Visited With: Patient  Routine Visit: Follow-up  Continue Visiting: Yes         Values/Beliefs  Spiritual Requests During Hospitalization: [place] today     Alfonso Lakhani

## 2024-06-25 NOTE — PROGRESS NOTES
06/25/24 0840   Discharge Planning   Home or Post Acute Services In home services   Type of Home Care Services Home OT;Home PT;Home nursing visits   Patient expects to be discharged to: Home with [facility].  Patient was set up with [facility] after discharge from [facility] 6/18/24.  He will need an EXTERNAL home care referral at discharge.  [facility] and CHF Navigator to follow at d/c.   Does the patient need discharge transport arranged? No

## 2024-06-25 NOTE — PROGRESS NOTES
CHF Clinical Nurse Navigator Documentation    Congestive Heart Failure disease education was performed by the Clinical Nurse Navigator with a good understanding: yes, verbalized understanding  Mini-Cog test completed: N/A    Living With Heart Failure Education booklet provided:  yes  CHF signs and symptoms discussed and when to call cardiologist:  yes  Compliant with home medications: yes  Low sodium nutrition education reviewed: yes, kind of watches his salt intake  Fluid Restriction Education reviewed: yes, discussed recommended daily fluid allowance for CHF Dx (49-64 oz)  Daily Weight Education reviewed: yes, reports he has working scale @ home.    Cardiovascular Rehab Referral ordered:  no  Follow-up with Cardiologist after discharge education provided: yes, prefers to make his own appointment with his cardiologist, Dr Yeboah.   Patient/family to make follow up appts: patient    Meds to Beds discussed: yes   Meds to Beds Opt In charted: yes    Comments: Met with patient @ bedside for information exchange & CHF education. Discussed HF Navigator role/HF program. Patient agreeable to being followed post hospital discharge to help manage CHF. Patient reports he does not think he's ever been told he has CHF. 2D echo performed 3/11/24, showed pEF, with mild to mod. AVS. Discharge plan home today-[facility] referral placed. I will continue to folow to help manage CHF.

## 2024-06-26 ENCOUNTER — DOCUMENTATION (OUTPATIENT)
Dept: PRIMARY CARE | Facility: CLINIC | Age: 84
End: 2024-06-26
Payer: MEDICARE

## 2024-06-26 ENCOUNTER — PATIENT OUTREACH (OUTPATIENT)
Dept: PRIMARY CARE | Facility: CLINIC | Age: 84
End: 2024-06-26
Payer: MEDICARE

## 2024-06-26 ENCOUNTER — PATIENT OUTREACH (OUTPATIENT)
Dept: HOME HEALTH SERVICES | Age: 84
End: 2024-06-26
Payer: MEDICARE

## 2024-06-26 NOTE — PROGRESS NOTES
Discharge Facility:  [facility].       Discharge Diagnosis:    Acute diastolic congestive heart failure (Multi)     Issues Requiring Follow-Up  Heart failure navigator, healthy at home program, home health care and follow-up with Dr. Yeboah cardiologist in 1 week for consideration for heart catheterization as well as timing of resumption of antiplatelet therapy she is currently on hold due to recent upper gastrointestinal bleeding      Admission Date: 6/23/2024   Discharge Date:  6/25/2024     PCP Appointment Date: 6/27/2024     Specialist Appointment Date:     [facility]     7/1/2024 Herminio Yeboah MD (Cardiology)    Hospital Encounter and Summary: Linked    Two attempts were made to reach patient within two business days after discharge. Voicemail left with contact information for patient to call back with any non-emergent questions or concerns.

## 2024-06-27 ENCOUNTER — OFFICE VISIT (OUTPATIENT)
Dept: PRIMARY CARE | Facility: CLINIC | Age: 84
End: 2024-06-27
Payer: MEDICARE

## 2024-06-27 ENCOUNTER — TELEPHONE (OUTPATIENT)
Dept: PRIMARY CARE | Facility: CLINIC | Age: 84
End: 2024-06-27

## 2024-06-27 VITALS
WEIGHT: 174 LBS | DIASTOLIC BLOOD PRESSURE: 60 MMHG | RESPIRATION RATE: 18 BRPM | SYSTOLIC BLOOD PRESSURE: 122 MMHG | OXYGEN SATURATION: 96 % | HEART RATE: 72 BPM | BODY MASS INDEX: 28.96 KG/M2

## 2024-06-27 DIAGNOSIS — I50.31 ACUTE DIASTOLIC CONGESTIVE HEART FAILURE (MULTI): ICD-10-CM

## 2024-06-27 DIAGNOSIS — D64.9 SYMPTOMATIC ANEMIA: ICD-10-CM

## 2024-06-27 DIAGNOSIS — K27.9 PEPTIC ULCER DISEASE: ICD-10-CM

## 2024-06-27 DIAGNOSIS — I21.A1 TYPE 2 ACUTE MYOCARDIAL INFARCTION (MULTI): ICD-10-CM

## 2024-06-27 DIAGNOSIS — I21.A1 MYOCARDIAL INFARCTION TYPE 2 (MULTI): Primary | ICD-10-CM

## 2024-06-27 DIAGNOSIS — K92.1 MELENA: ICD-10-CM

## 2024-06-27 DIAGNOSIS — M1A.9XX0 CHRONIC GOUT WITHOUT TOPHUS, UNSPECIFIED CAUSE, UNSPECIFIED SITE: ICD-10-CM

## 2024-06-27 DIAGNOSIS — K92.2 GI BLEED DUE TO NSAIDS: ICD-10-CM

## 2024-06-27 DIAGNOSIS — T39.395A GI BLEED DUE TO NSAIDS: ICD-10-CM

## 2024-06-27 LAB
ATRIAL RATE: 74 BPM
P AXIS: 47 DEGREES
P OFFSET: 188 MS
P ONSET: 136 MS
PR INTERVAL: 178 MS
Q ONSET: 225 MS
QRS COUNT: 12 BEATS
QRS DURATION: 78 MS
QT INTERVAL: 426 MS
QTC CALCULATION(BAZETT): 472 MS
QTC FREDERICIA: 457 MS
R AXIS: 30 DEGREES
T AXIS: 73 DEGREES
T OFFSET: 438 MS
VENTRICULAR RATE: 74 BPM

## 2024-06-27 PROCEDURE — 1036F TOBACCO NON-USER: CPT | Performed by: FAMILY MEDICINE

## 2024-06-27 PROCEDURE — 1159F MED LIST DOCD IN RCRD: CPT | Performed by: FAMILY MEDICINE

## 2024-06-27 PROCEDURE — 3078F DIAST BP <80 MM HG: CPT | Performed by: FAMILY MEDICINE

## 2024-06-27 PROCEDURE — 1158F ADVNC CARE PLAN TLK DOCD: CPT | Performed by: FAMILY MEDICINE

## 2024-06-27 PROCEDURE — 1111F DSCHRG MED/CURRENT MED MERGE: CPT | Performed by: FAMILY MEDICINE

## 2024-06-27 PROCEDURE — 99214 OFFICE O/P EST MOD 30 MIN: CPT | Performed by: FAMILY MEDICINE

## 2024-06-27 PROCEDURE — 3074F SYST BP LT 130 MM HG: CPT | Performed by: FAMILY MEDICINE

## 2024-06-27 PROCEDURE — 1123F ACP DISCUSS/DSCN MKR DOCD: CPT | Performed by: FAMILY MEDICINE

## 2024-06-27 PROCEDURE — 99495 TRANSJ CARE MGMT MOD F2F 14D: CPT | Performed by: FAMILY MEDICINE

## 2024-06-27 PROCEDURE — 1125F AMNT PAIN NOTED PAIN PRSNT: CPT | Performed by: FAMILY MEDICINE

## 2024-06-27 RX ORDER — METOPROLOL SUCCINATE 100 MG/1
100 TABLET, EXTENDED RELEASE ORAL DAILY
Qty: 90 TABLET | Refills: 3 | Status: SHIPPED | OUTPATIENT
Start: 2024-06-27

## 2024-06-27 RX ORDER — POTASSIUM CHLORIDE 20 MEQ/1
20 TABLET, EXTENDED RELEASE ORAL DAILY
Qty: 90 TABLET | Refills: 2 | Status: SHIPPED | OUTPATIENT
Start: 2024-06-27

## 2024-06-27 RX ORDER — PANTOPRAZOLE SODIUM 40 MG/1
40 TABLET, DELAYED RELEASE ORAL 2 TIMES DAILY
Qty: 90 TABLET | Refills: 1 | Status: SHIPPED | OUTPATIENT
Start: 2024-06-27

## 2024-06-27 RX ORDER — ALLOPURINOL 300 MG/1
300 TABLET ORAL DAILY
Qty: 90 TABLET | Refills: 3 | Status: SHIPPED | OUTPATIENT
Start: 2024-06-27

## 2024-06-27 RX ORDER — AMLODIPINE BESYLATE 5 MG/1
5 TABLET ORAL DAILY
Qty: 90 TABLET | Refills: 3 | Status: SHIPPED | OUTPATIENT
Start: 2024-06-27

## 2024-06-27 RX ORDER — ROSUVASTATIN CALCIUM 40 MG/1
40 TABLET, COATED ORAL NIGHTLY
Qty: 90 TABLET | Refills: 3 | Status: SHIPPED | OUTPATIENT
Start: 2024-06-27

## 2024-06-27 RX ORDER — FERROUS SULFATE 325(65) MG
325 TABLET, DELAYED RELEASE (ENTERIC COATED) ORAL
Qty: 90 TABLET | Refills: 3 | Status: SHIPPED | OUTPATIENT
Start: 2024-06-27

## 2024-06-27 RX ORDER — ACETAMINOPHEN 325 MG/1
325-650 TABLET ORAL EVERY 8 HOURS PRN
Qty: 90 TABLET | Refills: 3 | Status: SHIPPED | OUTPATIENT
Start: 2024-06-27

## 2024-06-27 RX ORDER — FUROSEMIDE 40 MG/1
40 TABLET ORAL DAILY
Qty: 90 TABLET | Refills: 3 | Status: SHIPPED | OUTPATIENT
Start: 2024-06-27

## 2024-06-27 ASSESSMENT — ENCOUNTER SYMPTOMS
OCCASIONAL FEELINGS OF UNSTEADINESS: 0
LOSS OF SENSATION IN FEET: 0
DEPRESSION: 0

## 2024-06-27 ASSESSMENT — PATIENT HEALTH QUESTIONNAIRE - PHQ9
2. FEELING DOWN, DEPRESSED OR HOPELESS: NOT AT ALL
SUM OF ALL RESPONSES TO PHQ9 QUESTIONS 1 AND 2: 0
1. LITTLE INTEREST OR PLEASURE IN DOING THINGS: NOT AT ALL

## 2024-06-27 ASSESSMENT — PAIN SCALES - GENERAL: PAINLEVEL: 3

## 2024-06-27 NOTE — TELEPHONE ENCOUNTER
[facility] is working with patient. BP meds changed again. Metoprolol Succ bottle thrown away by children, so patient didn't take yesterday.  It is too early per insurance to refill. Patient diagnosed with CHF in last admission. Patient has hospital discharge appt today with NAMRATA.

## 2024-06-27 NOTE — PROGRESS NOTES
Subjective   Patient ID: Herminio Devlin is a 84 y.o. male who presents for Follow-up (Patient is here for a [facility] follow up for heart failure and ulcer ).    HPI   He is here for follow-up of hospitalization for small gastric ulcer.  He was seen by GI and was started on Protonix and received IV iron.  He he continues with Protonix and Carafate and ferrous sulfate and is following with GI (Dr. Smith).    2. He is also here for follow-up of hospitalization for exacerbation of congestive heart failure. He was admitted on 6/22/2024 and discharged on 6/25/2024.  He was found to have a mild myocardial infarction.  He was followed by Dr. Chavarria who is covering for Dr. Yeboah.  His metoprolol tartrate was changed to metoprolol succinate 100 mg daily.  He is following with Dr. Yeboah next week.    Review of Systems  Denies headache, blurred vision, chest pain, shortness of breath, nausea or vomiting, change in bowel habits or leg pain or swelling.    Objective   /60 (BP Location: Left arm, Patient Position: Sitting, BP Cuff Size: Large adult)   Pulse 72   Resp 18   Wt 78.9 kg (174 lb)   SpO2 96%   BMI 28.96 kg/m²     Physical Exam  Vitals and nurse's notes reviewed  General: no acute distress  HEENT: Normal  Neck: Supple  Lungs: Clear  Cardio: RRR w/o murmur  Abdomen: Soft, nontender, no hepatosplenomegaly  Extremities: No edema, no calf tenderness  Neuro: Alert and oriented with no focal deficits    Assessment/Plan   Problem List Items Addressed This Visit             ICD-10-CM       High    Chronic gout, unspecified, without tophus (tophi) M1A.9XX0       Medium    Myocardial infarction type 2 (Multi) - Primary I21.A1     Continue current medication and following with Dr. Yeboah.         GI bleed due to NSAIDs K92.2, T39.395A    Symptomatic anemia D64.9    Acute diastolic congestive heart failure (Multi) I50.31    Peptic ulcer disease K27.9     Continue Protonix and  Carafate and iron supplement.  Follow-up with Dr. Smith as scheduled.          Other Visit Diagnoses         Codes    Type 2 acute myocardial infarction (Multi)     I21.A1    Shanae     K92.1

## 2024-06-28 ENCOUNTER — PATIENT OUTREACH (OUTPATIENT)
Dept: PRIMARY CARE | Facility: CLINIC | Age: 84
End: 2024-06-28
Payer: MEDICARE

## 2024-06-28 ENCOUNTER — PATIENT OUTREACH (OUTPATIENT)
Dept: CASE MANAGEMENT | Facility: HOSPITAL | Age: 84
End: 2024-06-28
Payer: MEDICARE

## 2024-06-28 NOTE — PROGRESS NOTES
HF Nurse Navigator outreach to patient via phone call to review HF education and check on progress. No answer, message left with contact information to return my call.

## 2024-06-28 NOTE — PROGRESS NOTES
Unable to reach patient for call back after patient's follow up appointment with PCP on 6/27/2024     [facility] with call back number for patient to call if needed   If no voicemail available call attempts x 2 were made to contact the patient to assist with any questions or concerns patient may have.       L/M encouraged patient to call providers for any questions concerns or change in condition

## 2024-07-01 ENCOUNTER — OFFICE VISIT (OUTPATIENT)
Dept: CARDIOLOGY | Facility: CLINIC | Age: 84
End: 2024-07-01
Payer: MEDICARE

## 2024-07-01 VITALS
DIASTOLIC BLOOD PRESSURE: 70 MMHG | BODY MASS INDEX: 29.39 KG/M2 | WEIGHT: 176.4 LBS | HEIGHT: 65 IN | SYSTOLIC BLOOD PRESSURE: 145 MMHG | HEART RATE: 56 BPM | OXYGEN SATURATION: 98 %

## 2024-07-01 DIAGNOSIS — I21.A1 TYPE 2 ACUTE MYOCARDIAL INFARCTION (MULTI): ICD-10-CM

## 2024-07-01 DIAGNOSIS — I50.31 ACUTE DIASTOLIC CONGESTIVE HEART FAILURE (MULTI): ICD-10-CM

## 2024-07-01 DIAGNOSIS — I25.10 CORONARY ARTERY DISEASE, UNSPECIFIED VESSEL OR LESION TYPE, UNSPECIFIED WHETHER ANGINA PRESENT, UNSPECIFIED WHETHER NATIVE OR TRANSPLANTED HEART: ICD-10-CM

## 2024-07-01 PROCEDURE — 1126F AMNT PAIN NOTED NONE PRSNT: CPT | Performed by: INTERNAL MEDICINE

## 2024-07-01 PROCEDURE — 1111F DSCHRG MED/CURRENT MED MERGE: CPT | Performed by: INTERNAL MEDICINE

## 2024-07-01 PROCEDURE — 1159F MED LIST DOCD IN RCRD: CPT | Performed by: INTERNAL MEDICINE

## 2024-07-01 PROCEDURE — 1123F ACP DISCUSS/DSCN MKR DOCD: CPT | Performed by: INTERNAL MEDICINE

## 2024-07-01 PROCEDURE — 1160F RVW MEDS BY RX/DR IN RCRD: CPT | Performed by: INTERNAL MEDICINE

## 2024-07-01 PROCEDURE — 99214 OFFICE O/P EST MOD 30 MIN: CPT | Performed by: INTERNAL MEDICINE

## 2024-07-01 PROCEDURE — 3078F DIAST BP <80 MM HG: CPT | Performed by: INTERNAL MEDICINE

## 2024-07-01 PROCEDURE — 1036F TOBACCO NON-USER: CPT | Performed by: INTERNAL MEDICINE

## 2024-07-01 PROCEDURE — 3075F SYST BP GE 130 - 139MM HG: CPT | Performed by: INTERNAL MEDICINE

## 2024-07-01 RX ORDER — FUROSEMIDE 20 MG/1
20 TABLET ORAL DAILY
Qty: 30 TABLET | Refills: 11 | Status: SHIPPED | OUTPATIENT
Start: 2024-07-01

## 2024-07-01 RX ORDER — ASPIRIN 81 MG/1
81 TABLET ORAL DAILY
Qty: 30 TABLET | Refills: 11 | Status: SHIPPED | OUTPATIENT
Start: 2024-07-01 | End: 2025-07-01

## 2024-07-01 ASSESSMENT — PAIN SCALES - GENERAL: PAINLEVEL: 0-NO PAIN

## 2024-07-01 ASSESSMENT — ENCOUNTER SYMPTOMS
OCCASIONAL FEELINGS OF UNSTEADINESS: 0
DEPRESSION: 0
LOSS OF SENSATION IN FEET: 0

## 2024-07-02 ENCOUNTER — PATIENT OUTREACH (OUTPATIENT)
Dept: CASE MANAGEMENT | Facility: HOSPITAL | Age: 84
End: 2024-07-02
Payer: MEDICARE

## 2024-07-02 NOTE — PROGRESS NOTES
Questions for Outreach call to CHF patient in HF Nurse Navigator Program:  Have follow up appointments been scheduled for Cardiologist? completed  Are daily weights being completed? yes  Is low sodium diet for symptom control being followed? yes  Are all medications being taken with no issues/questions? yes  Are you experiencing any HF symptoms? No   Do you know when to reach out to the doctor? Yes, reviewed HF flare up symptoms to report  Comments: Spoke with Juan Alberto reports he is feeling better. Doing well managing his CHF. His Cardiologist adjusted his Lasix dose, now takes 20 mg daily & was placed on 38-48 fluid restriction. I will continue to follow to help manage CHF.

## 2024-07-03 ENCOUNTER — LAB (OUTPATIENT)
Dept: LAB | Facility: LAB | Age: 84
End: 2024-07-03
Payer: MEDICARE

## 2024-07-03 DIAGNOSIS — I25.10 CORONARY ARTERY DISEASE, UNSPECIFIED VESSEL OR LESION TYPE, UNSPECIFIED WHETHER ANGINA PRESENT, UNSPECIFIED WHETHER NATIVE OR TRANSPLANTED HEART: ICD-10-CM

## 2024-07-03 LAB
ANION GAP SERPL CALC-SCNC: 17 MMOL/L (ref 10–20)
BUN SERPL-MCNC: 20 MG/DL (ref 6–23)
CALCIUM SERPL-MCNC: 9 MG/DL (ref 8.6–10.6)
CHLORIDE SERPL-SCNC: 98 MMOL/L (ref 98–107)
CO2 SERPL-SCNC: 25 MMOL/L (ref 21–32)
CREAT SERPL-MCNC: 1.31 MG/DL (ref 0.5–1.3)
EGFRCR SERPLBLD CKD-EPI 2021: 54 ML/MIN/1.73M*2
ERYTHROCYTE [DISTWIDTH] IN BLOOD BY AUTOMATED COUNT: 18.3 % (ref 11.5–14.5)
GLUCOSE SERPL-MCNC: 103 MG/DL (ref 74–99)
HCT VFR BLD AUTO: 31.5 % (ref 41–52)
HGB BLD-MCNC: 10.1 G/DL (ref 13.5–17.5)
MAGNESIUM SERPL-MCNC: 2.02 MG/DL (ref 1.6–2.4)
MCH RBC QN AUTO: 31.7 PG (ref 26–34)
MCHC RBC AUTO-ENTMCNC: 32.1 G/DL (ref 32–36)
MCV RBC AUTO: 99 FL (ref 80–100)
NRBC BLD-RTO: 0 /100 WBCS (ref 0–0)
PLATELET # BLD AUTO: 350 X10*3/UL (ref 150–450)
POTASSIUM SERPL-SCNC: 4.6 MMOL/L (ref 3.5–5.3)
RBC # BLD AUTO: 3.19 X10*6/UL (ref 4.5–5.9)
SODIUM SERPL-SCNC: 135 MMOL/L (ref 136–145)
WBC # BLD AUTO: 7.3 X10*3/UL (ref 4.4–11.3)

## 2024-07-03 PROCEDURE — 80048 BASIC METABOLIC PNL TOTAL CA: CPT

## 2024-07-03 PROCEDURE — 83735 ASSAY OF MAGNESIUM: CPT

## 2024-07-03 PROCEDURE — 85027 COMPLETE CBC AUTOMATED: CPT

## 2024-07-03 PROCEDURE — 36415 COLL VENOUS BLD VENIPUNCTURE: CPT

## 2024-07-05 ENCOUNTER — TELEPHONE (OUTPATIENT)
Dept: PRIMARY CARE | Facility: CLINIC | Age: 84
End: 2024-07-05
Payer: MEDICARE

## 2024-07-05 NOTE — TELEPHONE ENCOUNTER
Niurka  called from [facility] [phone] wanted to report that he had a fall today at his home while she was there, he was chasing 2 large dogs and fell in the grass, on his knee  no injuries or pain, told her Dr Price is out this pm

## 2024-07-10 ENCOUNTER — PATIENT OUTREACH (OUTPATIENT)
Dept: CASE MANAGEMENT | Facility: HOSPITAL | Age: 84
End: 2024-07-10
Payer: MEDICARE

## 2024-07-10 NOTE — PROGRESS NOTES
Questions for Outreach call to CHF patient in HF Nurse Navigator Program:  Have follow up appointments been scheduled for Cardiologist? completed  Are daily weights being completed? yes  Is low sodium diet for symptom control being followed? yes  Are all medications being taken with no issues/questions? yes  Are you experiencing any HF symptoms? no  Do you know when to reach out to the doctor? Yes, reviewed HF flare up symptoms to report  Comments: Spoke with Juan Alberto reports he is doing well managing his CHF. He asked how long he needs to take Lasix. Explained that Lasix plays important role in getting rid of excess fluid that may build up around the heart. He verbalized an understanding. I will continue to follow.

## 2024-07-16 ENCOUNTER — PATIENT OUTREACH (OUTPATIENT)
Dept: CASE MANAGEMENT | Facility: HOSPITAL | Age: 84
End: 2024-07-16
Payer: MEDICARE

## 2024-07-16 NOTE — PROGRESS NOTES
Questions for Outreach call to CHF patient in HF Nurse Navigator Program:  Have follow up appointments been scheduled for Cardiologist? completed  Are daily weights being completed? yes  Is low sodium diet for symptom control being followed? yes  Are all medications being taken with no issues/questions? yes  Are you experiencing any HF symptoms? no  Do you know when to reach out to the doctor? Yes, Reviewed HF flare up symptoms to report.  Comments: Spoke with Juan Alberto reports he is doing well managing his CHF. I will continue to follow.

## 2024-07-23 ENCOUNTER — PATIENT OUTREACH (OUTPATIENT)
Dept: PRIMARY CARE | Facility: CLINIC | Age: 84
End: 2024-07-23
Payer: MEDICARE

## 2024-07-23 NOTE — PROGRESS NOTES
Call regarding F/U     At time of outreach call the patient feels as if their condition has improved  since last visit.    Patient states he is doing well, continues with [facility] .      Patient encouraged to call providers for any questions concerns or change in condition .

## 2024-07-25 ENCOUNTER — PATIENT OUTREACH (OUTPATIENT)
Dept: CASE MANAGEMENT | Facility: HOSPITAL | Age: 84
End: 2024-07-25
Payer: MEDICARE

## 2024-07-25 DIAGNOSIS — I65.29 OCCLUSION AND STENOSIS OF UNSPECIFIED CAROTID ARTERY: ICD-10-CM

## 2024-07-25 DIAGNOSIS — I25.10 ATHEROSCLEROTIC HEART DISEASE OF NATIVE CORONARY ARTERY WITHOUT ANGINA PECTORIS: ICD-10-CM

## 2024-07-25 NOTE — PROGRESS NOTES
Questions for Outreach call to CHF patient in HF Nurse Navigator Program:  Have follow up appointments been scheduled for Cardiologist? Completed  Are daily weights being completed? yes  Is low sodium diet for symptom control being followed? yes  Are all medications being taken with no issues/questions? yes  Are you experiencing any HF symptoms? no  Do you know when to reach out to the doctor? Yes, verbalized HF flare up symptoms to report  Comments: Spoke with Juan Alberto reports that he is doing well managing his CHF. All HF goals met. HF case closed today.

## 2024-07-29 RX ORDER — ISOSORBIDE MONONITRATE 30 MG/1
30 TABLET, EXTENDED RELEASE ORAL DAILY
Qty: 90 TABLET | Refills: 3 | OUTPATIENT
Start: 2024-07-29 | End: 2025-07-29

## 2024-07-29 RX ORDER — ISOSORBIDE MONONITRATE 60 MG/1
60 TABLET, EXTENDED RELEASE ORAL DAILY
Qty: 90 TABLET | Refills: 3 | OUTPATIENT
Start: 2024-07-29 | End: 2025-07-29

## 2024-08-01 ENCOUNTER — TELEPHONE (OUTPATIENT)
Dept: PRIMARY CARE | Facility: CLINIC | Age: 84
End: 2024-08-01
Payer: MEDICARE

## 2024-08-01 NOTE — TELEPHONE ENCOUNTER
Justine, from [facility], called to see if NAMRATA would approve/prescribe a lidocaine patch for the patient. His pain is a 6/10 when sitting, and she feels he needs something else for pain control. Please call Justine back, and if she doesn't answer, leave a detailed message. This is her personal phone number - [phone].

## 2024-08-01 NOTE — TELEPHONE ENCOUNTER
I spoke to Justine and said that it would be fine to use lidocaine patch patches for his back pain which flared up.  He is can use the over-the-counter lidocaine patches.

## 2024-08-06 ENCOUNTER — TELEPHONE (OUTPATIENT)
Dept: PRIMARY CARE | Facility: CLINIC | Age: 84
End: 2024-08-06
Payer: MEDICARE

## 2024-08-06 NOTE — TELEPHONE ENCOUNTER
Please let Justine know that this question should be directed towards his cardiologist Dr. Yeboah who prescribes his Plavix.

## 2024-08-06 NOTE — TELEPHONE ENCOUNTER
Justine called from [facility] [phone] he was taken off Plavix 75 mg when he went in the hospital for a GI bleed, does Dr Price want him to resume? He took it yesterday and today on his own

## 2024-08-06 NOTE — TELEPHONE ENCOUNTER
Informed Justine from [facility], voice educated and understanding.

## 2024-08-07 ENCOUNTER — TRANSCRIBE ORDERS (OUTPATIENT)
Dept: ORTHOPEDIC SURGERY | Facility: HOSPITAL | Age: 84
End: 2024-08-07
Payer: MEDICARE

## 2024-08-07 ENCOUNTER — TELEPHONE (OUTPATIENT)
Dept: CARDIOLOGY | Facility: CLINIC | Age: 84
End: 2024-08-07
Payer: MEDICARE

## 2024-08-07 DIAGNOSIS — M54.16 LUMBAR RADICULOPATHY: ICD-10-CM

## 2024-08-09 ENCOUNTER — APPOINTMENT (OUTPATIENT)
Dept: ORTHOPEDIC SURGERY | Facility: CLINIC | Age: 84
End: 2024-08-09
Payer: MEDICARE

## 2024-08-09 ENCOUNTER — TELEPHONE (OUTPATIENT)
Dept: PRIMARY CARE | Facility: CLINIC | Age: 84
End: 2024-08-09

## 2024-08-09 ENCOUNTER — HOSPITAL ENCOUNTER (OUTPATIENT)
Dept: RADIOLOGY | Facility: CLINIC | Age: 84
Discharge: HOME | End: 2024-08-09
Payer: MEDICARE

## 2024-08-09 VITALS — HEIGHT: 65 IN | BODY MASS INDEX: 28.82 KG/M2 | WEIGHT: 173 LBS

## 2024-08-09 DIAGNOSIS — M96.1 LUMBAR POST-LAMINECTOMY SYNDROME: ICD-10-CM

## 2024-08-09 DIAGNOSIS — M54.16 LUMBAR RADICULOPATHY: ICD-10-CM

## 2024-08-09 DIAGNOSIS — I21.A1 TYPE 2 ACUTE MYOCARDIAL INFARCTION (MULTI): ICD-10-CM

## 2024-08-09 DIAGNOSIS — I50.31 ACUTE DIASTOLIC CONGESTIVE HEART FAILURE (MULTI): ICD-10-CM

## 2024-08-09 DIAGNOSIS — M48.062 LUMBAR STENOSIS WITH NEUROGENIC CLAUDICATION: Primary | ICD-10-CM

## 2024-08-09 PROCEDURE — 1159F MED LIST DOCD IN RCRD: CPT | Performed by: ORTHOPAEDIC SURGERY

## 2024-08-09 PROCEDURE — 1123F ACP DISCUSS/DSCN MKR DOCD: CPT | Performed by: ORTHOPAEDIC SURGERY

## 2024-08-09 PROCEDURE — 72110 X-RAY EXAM L-2 SPINE 4/>VWS: CPT

## 2024-08-09 PROCEDURE — 99204 OFFICE O/P NEW MOD 45 MIN: CPT | Performed by: ORTHOPAEDIC SURGERY

## 2024-08-09 RX ORDER — CLOPIDOGREL BISULFATE 75 MG/1
75 TABLET ORAL DAILY
COMMUNITY

## 2024-08-09 RX ORDER — POTASSIUM CHLORIDE 20 MEQ/1
20 TABLET, EXTENDED RELEASE ORAL DAILY
Qty: 90 TABLET | Refills: 1 | Status: SHIPPED | OUTPATIENT
Start: 2024-08-09

## 2024-08-09 ASSESSMENT — PAIN - FUNCTIONAL ASSESSMENT: PAIN_FUNCTIONAL_ASSESSMENT: 0-10

## 2024-08-09 NOTE — TELEPHONE ENCOUNTER
Patient called requesting a refill on potassium chloride 20 mEq, but he is also wondering if he should still be taking this.    If so please send to [facility] on [address]    Contact: [phone]

## 2024-08-09 NOTE — LETTER
August 13, 2024     Donald Price MD  [address] [place] [facility]  [address]  [place] [address]    Patient: Herminio Devlin   YOB: 1940   Date of Visit: 8/9/2024       Dear Dr. Donald Price MD:    Thank you for referring Herminio Devlin to me for evaluation. Below are my notes for this consultation.  If you have questions, please do not hesitate to call me. I look forward to following your patient along with you.       Sincerely,     Nicholas Pardo MD      CC: No Recipients  ______________________________________________________________________________________    HPI:Herminio Devlin is an 84-year-old man who comes in with complaints of low back pain.  He describes claudication specific back pain.  Past medical history is significant for prior L4-5 fusion performed at the [facility] a number of years ago.  He has not had physical therapy injections or other nonoperative management.      ROS:  Reviewed on EMR and patient intake sheet.    PMH/SH:  Reviewed on EMR and patient intake sheet.    Exam:  Physical Exam    Constitutional: Well appearing; no acute distress  Eyes: pupils are equal and round  Psych: normal affect  Respiratory: non-labored breathing  Cardiovascular: regular rate and rhythm  GI: non-distended abdomen  Musculoskeletal: no pain with range of motion of the hips bilaterally  Neurologic: [4+]/5 strength in the lower extremities bilaterally]; [negative] straight leg raise    Radiology:     X-rays demonstrate L4-5 instrumented fusion.  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  At this time I recommended combination of physical therapy and steroid injections.  We discussed the role of pain management and steroid injections.  This  included a brief overview of the injection procedure itself, the rationale behind this procedure, and the appropriate expectations for treatment of the patient's pain.  A referral to a pain management specialist was offered to the patient.    If his symptoms persist, he will need an MRI and follow-up.    The patient was in agreement with the plan. At the end of the visit today, the patient felt that all questions had been answered satisfactorily.  The patient was pleased with the visit and very appreciative for the care rendered.     Thank you very much for the kind referral.  It is a privilege, and a pleasure, to partner with you in the care of your patients.  I would be delighted to assist you with any further consultations as needed.          Nicholas Pardo MD    Chief of Spine Surgery, [facility]  Director of Spine Service, [facility]  , Department of Orthopaedics  [employer]  [address] [place] [id number]  P: [phone]  [email or web address]    This note was dictated with voice recognition software.  It has not been proofread for grammatical errors, typographical mistakes or other semantic inconsistencies.

## 2024-08-09 NOTE — TELEPHONE ENCOUNTER
I spoke to patient. He apparently had a follow up with GI and mentions everything being good. He resumed plavix a few days ago, denying any reoccurrence of bleeding. He has a follow up with Dr. Yeboah Monday 8/12.

## 2024-08-12 ENCOUNTER — OFFICE VISIT (OUTPATIENT)
Dept: CARDIOLOGY | Facility: CLINIC | Age: 84
End: 2024-08-12
Payer: MEDICARE

## 2024-08-12 VITALS
WEIGHT: 178.1 LBS | DIASTOLIC BLOOD PRESSURE: 70 MMHG | BODY MASS INDEX: 29.67 KG/M2 | HEART RATE: 72 BPM | SYSTOLIC BLOOD PRESSURE: 145 MMHG | OXYGEN SATURATION: 97 % | HEIGHT: 65 IN

## 2024-08-12 DIAGNOSIS — I25.10 CAD IN NATIVE ARTERY: Primary | ICD-10-CM

## 2024-08-12 PROCEDURE — 99214 OFFICE O/P EST MOD 30 MIN: CPT | Performed by: INTERNAL MEDICINE

## 2024-08-12 PROCEDURE — 1160F RVW MEDS BY RX/DR IN RCRD: CPT | Performed by: INTERNAL MEDICINE

## 2024-08-12 PROCEDURE — 3078F DIAST BP <80 MM HG: CPT | Performed by: INTERNAL MEDICINE

## 2024-08-12 PROCEDURE — 1036F TOBACCO NON-USER: CPT | Performed by: INTERNAL MEDICINE

## 2024-08-12 PROCEDURE — 1126F AMNT PAIN NOTED NONE PRSNT: CPT | Performed by: INTERNAL MEDICINE

## 2024-08-12 PROCEDURE — 3077F SYST BP >= 140 MM HG: CPT | Performed by: INTERNAL MEDICINE

## 2024-08-12 PROCEDURE — 1159F MED LIST DOCD IN RCRD: CPT | Performed by: INTERNAL MEDICINE

## 2024-08-12 PROCEDURE — 1123F ACP DISCUSS/DSCN MKR DOCD: CPT | Performed by: INTERNAL MEDICINE

## 2024-08-12 ASSESSMENT — ENCOUNTER SYMPTOMS
LOSS OF SENSATION IN FEET: 0
DEPRESSION: 0
OCCASIONAL FEELINGS OF UNSTEADINESS: 0

## 2024-08-12 ASSESSMENT — PAIN SCALES - GENERAL: PAINLEVEL: 0-NO PAIN

## 2024-08-12 NOTE — PROGRESS NOTES
Subjective:  Herminio returns for 6-week follow-up.  I was pleased to see he has stayed stable since his last visit.  He has not had any recurrent bleeding.  He apparently was seen by GI in follow-up who felt things had resolved and stabilized from a GI standpoint.  He did go back on Plavix in addition to his aspirin, but I told him we will continue on aspirin alone to avoid precipitating recurrent GI bleeding.  He has been having some mildly elevated blood pressure readings at home.  He denies any  angina or claudication at a tolerable activity level.  He overall is reasonably happy and comfortable with how he is doing.  His repeat blood work showed only minimal elevation in his creatinine, and his hemoglobin is coming up reasonably well.  He denies any other new health concerns.    Objective:  General: Alert, usual pleasant self.  HEENT: Unchanged.  Lungs: Clear with good air exchange.  Cardiac: Normal S1 and S2 with 2-3/6 systolic murmur.  Abdomen: Nontender.  Extremities: No edema.  Skin: No rash.  Neuro: Grossly intact and unchanged.    Impression/plan:  Herminio is generally clinically stable at this time.  He is not having any concerning anginal type symptoms despite his prior history of CABG and complex stenting.  I will continue aspirin alone for his antiplatelet regimen.  I did not think we needed to embark on a repeat stress test at this time.    His blood pressure is actually in tolerable range at this time, and I am reluctant to drop his pressure further given his aortic stenosis as well as his known carotid disease.  He will continue to monitor his blood pressures at home and let me know if they escalate further.  Should this occur, we may need to sort out whether we need to add back an ACE or an ARB.    I will see him back in 3 months.  He knows to call for any intercurrent concerns.  When I see him back we will sort out when to reschedule a follow-up echocardiogram to reassess his aortic stenosis.   Will also plan on reassessing his lipid panel at sometime later this year or early next year.    Patient instructions:    Continue current medications unchanged.    Return to clinic in 3 months.    Call for any intercurrent problems.

## 2024-08-13 NOTE — PROGRESS NOTES
HPI:Herminio Devlin is an 84-year-old man who comes in with complaints of low back pain.  He describes claudication specific back pain.  Past medical history is significant for prior L4-5 fusion performed at the [facility] a number of years ago.  He has not had physical therapy injections or other nonoperative management.      ROS:  Reviewed on EMR and patient intake sheet.    PMH/SH:  Reviewed on EMR and patient intake sheet.    Exam:  Physical Exam    Constitutional: Well appearing; no acute distress  Eyes: pupils are equal and round  Psych: normal affect  Respiratory: non-labored breathing  Cardiovascular: regular rate and rhythm  GI: non-distended abdomen  Musculoskeletal: no pain with range of motion of the hips bilaterally  Neurologic: [4+]/5 strength in the lower extremities bilaterally]; [negative] straight leg raise    Radiology:     X-rays demonstrate L4-5 instrumented fusion.  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  At this time I recommended combination of physical therapy and steroid injections.  We discussed the role of pain management and steroid injections.  This included a brief overview of the injection procedure itself, the rationale behind this procedure, and the appropriate expectations for treatment of the patient's pain.  A referral to a pain management specialist was offered to the patient.    If his symptoms persist, he will need an MRI and follow-up.    The patient was in agreement with the plan. At the end of the visit today, the patient felt that all questions had been answered satisfactorily.  The patient was pleased with the visit and very appreciative for the care rendered.     Thank you very much for the kind referral.  It is a privilege, and  a pleasure, to partner with you in the care of your patients.  I would be delighted to assist you with any further consultations as needed.          Nicholas Pardo MD    Chief of Spine Surgery, [facility]  Director of Spine Service, [facility]  , Department of Orthopaedics  [facility] [employer]  [address]  [place] [id number]  P: [phone]  [email or web address]    This note was dictated with voice recognition software.  It has not been proofread for grammatical errors, typographical mistakes or other semantic inconsistencies.

## 2024-08-20 ENCOUNTER — EVALUATION (OUTPATIENT)
Dept: PHYSICAL THERAPY | Facility: CLINIC | Age: 84
End: 2024-08-20
Payer: MEDICARE

## 2024-08-20 DIAGNOSIS — M54.16 LUMBAR RADICULOPATHY: Primary | ICD-10-CM

## 2024-08-20 DIAGNOSIS — R26.2 DIFFICULTY WALKING: ICD-10-CM

## 2024-08-20 PROCEDURE — 97161 PT EVAL LOW COMPLEX 20 MIN: CPT | Mod: GP | Performed by: PHYSICAL THERAPIST

## 2024-08-20 PROCEDURE — 97110 THERAPEUTIC EXERCISES: CPT | Mod: GP | Performed by: PHYSICAL THERAPIST

## 2024-08-20 ASSESSMENT — PAIN - FUNCTIONAL ASSESSMENT: PAIN_FUNCTIONAL_ASSESSMENT: 0-10

## 2024-08-20 ASSESSMENT — PAIN SCALES - GENERAL: PAINLEVEL_OUTOF10: 6

## 2024-08-20 ASSESSMENT — ENCOUNTER SYMPTOMS
OCCASIONAL FEELINGS OF UNSTEADINESS: 0
DEPRESSION: 0
LOSS OF SENSATION IN FEET: 0

## 2024-08-20 NOTE — PROGRESS NOTES
"  Physical Therapy  Physical Therapy Orthopedic Evaluation    Patient Name: Herminio Devlin  MRN: [id number]  Today's Date: 8/20/2024  Time Calculation  Start Time: 0915  Stop Time: 1000  Time Calculation (min): 45 min  PT Evaluation Time Entry  PT Evaluation (Low) Time Entry: 35, PT Therapeutic Procedures Time Entry  Therapeutic Exercise Time Entry: 8,      Insurance:  Payor: MEDICARE / Plan: MEDICARE PART A AND B / Product Type: *No Product type* /   Number of Treatments Authorized: 1/MN  Certification Period Start Date: 08/20/24  Certification Period End Date: 11/18/24    Current Problem  1. Lumbar radiculopathy  Referral to Physical Therapy    Follow Up In Physical Therapy      2. Difficulty walking  Follow Up In Physical Therapy          General:  General  Reason for Referral: LBP  Referred By: Nicholas Pardo Md  Past Medical History Relevant to Rehab: Recent episodes of CHF that required hospitalization x 2 in June of 2024, Lumbar surgery ~ 5-6 years ago. (L4-5 fusion)  General Comment: States he is to see pain management for possible injection.    Precautions:   Precautions  STEADI Fall Risk Score (The score of 4 or more indicates an increased risk of falling): 4    Medical History Form: Reviewed (scanned into chart)    Subjective:   Subjective : Primary problem is LBP with standing and walking - ok when seated.     Pain:  Pain Assessment: 0-10  0-10 (Numeric) Pain Score: 6  Pain Type: Chronic pain  Pain Location: Back  Pain Orientation: Lower    Relevant Information (PMH & Previous Tests/Imaging): \"Laminectomy with L4-5 fusion having satisfactory appearance.  Advanced degenerative change L3-4 with a mild retrolisthesis.\"  Previous Interventions/Treatments: None    Prior Level of Function (PLOF)  Prior Function Per Pt/Caregiver Report  Level of [place]: Independent with ADLs and functional transfers, Independent with homemaking with ambulation  Patient previously independent with all ADLs    Patients Living " "Environment:   Home Living Comment: Lives with spouse in single level home - 3 steps to enter with rails.    Primary Language: English    Red Flags: Do you have any of the following? No  Fever/chills, unexplained weight changes, dizziness/fainting, unexplained change in bowel or bladder functions, unexplained malaise or muscle weakness, night pain/sweats, numbness or tingling    Objective   Slow and labored transfer. Requires use of UE to perform.   Lumbar Spine  Lumbar AROM   Lumbar flexion: (60°): 60%  Lumbar extension (25°): 0% (increased \"ached\" to his toes.)  Lumbar rotation right (30°): 30%  Lumbar rotation left (30°): 50%  Lumbar sidebend right (25°): 20%  Lumbar sidebend left (25°): 20%  Hip AROM   R Hip Flexion (125°): 110  L Hip Flexion (125°): 115  R Hip Abduction (45°): 20  L Hip Abduction  (45°): 8  R hip ER: (45°): 25  L hip ER: (45°): 40  R hip IR: (45°): 35  L hip IR: (45°): 20    Specific Lower Extremity MMT   R Iliopsoas: (5/5): 4/5  L Iliopsoas: (5/5): 4/5  R Gluteals (prone): (5/5): 3/5 (In available ROM)  L Gluteals (prone): (5/5): 3/5 (In available ROM)    Special Tests   Supine SLR: (Negative): (-)   Prone quad length: knee flex to 90 deg bilaterally.  Supine hamstring length: -45 deg in 90-90 bilaterally.   Outcome Measures:    Other Measures  Oswestry Disablity Index (RENEE): 14/50 =28%    EDUCATION: home exercise program, plan of care, activity modifications, pain management, and injury pathology  Outpatient Education  Education Comment: Access Code: [email or web address]  URL: [email or web address]  Date: 08/20/2024  Prepared by: Issac Boothe    Exercises  - Seated Hamstring Stretch with Chair  - 1 x daily - 7 x weekly - 1-2 sets - 3 reps  - Seated Single Knee to Chest  - 1 x daily - 7 x weekly - 1-2 sets - 3 reps    Assessment:  PT Assessment Results: Decreased strength, Decreased range of motion, Decreased mobility, Pain  Rehab Prognosis: Good  Assessment Comment: Patient " arrives with primary complaint of LBP since being hospitalized x 2  in June for CHF. Patient's primary complaint is LBP with bilateral radicular symptoms, especially in WBing. Presents with LE weakness and deconditioned status as well. Patient will benefit from therapy to address weakness and pain.    Clinical Presentation: Evolving with changing characteristics  Personal Factors: None    Plan:  Treatment/Interventions: Education/ Instruction, Gait training, Manual therapy, Neuromuscular re-education, Taping techniques, Therapeutic activities, Therapeutic exercises, Self care/ home management  PT Plan: Skilled PT  PT Frequency: 2 times per week  Duration: 6 weeks  Onset Date: 06/16/24  Certification Period Start Date: 08/20/24  Certification Period End Date: 11/18/24  Number of Treatments Authorized: 1/MN  Rehab Potential: Good  Plan of Care Agreement: Patient  5 x sit to stand and TUG for goals.   Goals: Set and discussed today  Active       PT Problem       PT Goal 1       Start:  08/20/24    Expected End:  11/18/24       STG  1) Patient will improve Oswestry Low Back Disability Questionnaire by 5% in order to indicate a measurable improvement in daily function and ability to complete daily tasks in 3 weeks.  2) Patient will be able to complete standing ADLs with pain less than 5/10 in lumbar region in 4 weeks.  3) Patient will perform 5 x sit to  less than 15 seconds to demonstrate increased LE strength for safe home access and transfers in 4 weeks.    4) Patient will be independent with HEP to allow for continued improvement in daily tasks at home and in the community in 3 visits.   LTG  1) Patient will have 4/5 strength in lateral hip musculature to aid in stability with ambulation on varied surfaces in community in 6 weeks  2) Patient will increase hip IR ROM to equal opposite hip for ease of functional movements with less pain including supine to sit transfers in order to prevent increased pain with  daily tasks in 6 weeks.  3) Patient will be able to perform TUG in less than 15 seconds in order to allow for safe ambulation and to demonstrate reduced fall risk within the community in 6 weeks.   4) Patient will improve Oswestry Low Back Disability Questionnaire to </=15% in order to indicate a measurable improvement in daily function and ability to complete daily tasks in 6 weeks.              Plan of care was developed with input and agreement by the patient    Treatments:  Therapeutic Exercise  Therapeutic Exercise Activity 1: Seated SKTC: 10 hold x 3  Therapeutic Exercise Activity 2: Seated hamstring stretch: 30 hold x 3 - B    Ambulatory Screenings Summary       Screening  Frequency  Date Last Completed   Spiritual and Cultural Beliefs   Screening  each visit or episode of care    Falls Risk Screening  every ambulatory visit [unfilled]   Pain Screening  annually at primary care visit  8/12/2024   Domestic Violence screening  annually at primary care visit 2/21/2024   Elder Abuse Screening  annually at primary care visit    Depression Screening  annually in the primary care setting 7/1/2024   Suicide Risk Screening  annually in the primary care setting 6/22/2024   Nutrition and Food Insecurity   Screening  at least annually at primary care visit  6/17/2024   Key Learner  annually in the primary care setting    Drug Screen  6/27/2024 11:47 AM   Alcohol Screen  6/27/2024 11:47 AM   Advance Directive           Issac Boothe, PT

## 2024-08-27 ENCOUNTER — APPOINTMENT (OUTPATIENT)
Dept: PAIN MEDICINE | Facility: CLINIC | Age: 84
End: 2024-08-27
Payer: MEDICARE

## 2024-08-27 VITALS
WEIGHT: 175 LBS | DIASTOLIC BLOOD PRESSURE: 66 MMHG | SYSTOLIC BLOOD PRESSURE: 173 MMHG | HEIGHT: 65 IN | RESPIRATION RATE: 16 BRPM | HEART RATE: 64 BPM | BODY MASS INDEX: 29.16 KG/M2

## 2024-08-27 DIAGNOSIS — M54.16 LUMBAR RADICULOPATHY: ICD-10-CM

## 2024-08-27 DIAGNOSIS — M96.1 POST LAMINECTOMY SYNDROME: Primary | ICD-10-CM

## 2024-08-27 PROCEDURE — 3078F DIAST BP <80 MM HG: CPT | Performed by: PHYSICAL MEDICINE & REHABILITATION

## 2024-08-27 PROCEDURE — 99204 OFFICE O/P NEW MOD 45 MIN: CPT | Performed by: PHYSICAL MEDICINE & REHABILITATION

## 2024-08-27 PROCEDURE — 3077F SYST BP >= 140 MM HG: CPT | Performed by: PHYSICAL MEDICINE & REHABILITATION

## 2024-08-27 PROCEDURE — 1123F ACP DISCUSS/DSCN MKR DOCD: CPT | Performed by: PHYSICAL MEDICINE & REHABILITATION

## 2024-08-27 PROCEDURE — 1125F AMNT PAIN NOTED PAIN PRSNT: CPT | Performed by: PHYSICAL MEDICINE & REHABILITATION

## 2024-08-27 RX ORDER — DIAZEPAM 5 MG/1
5 TABLET ORAL ONCE AS NEEDED
OUTPATIENT
Start: 2024-08-27

## 2024-08-27 RX ORDER — SODIUM CHLORIDE, SODIUM LACTATE, POTASSIUM CHLORIDE, CALCIUM CHLORIDE 600; 310; 30; 20 MG/100ML; MG/100ML; MG/100ML; MG/100ML
20 INJECTION, SOLUTION INTRAVENOUS CONTINUOUS
OUTPATIENT
Start: 2024-08-27

## 2024-08-27 ASSESSMENT — PAIN SCALES - GENERAL: PAINLEVEL: 6

## 2024-08-27 NOTE — PROGRESS NOTES
Subjective   Patient ID: Herminio Devlin is a 84 y.o. male who presents for Back Pain.  HPI    Pleasant 84-year-old male referred by Dr. Pardo for consideration of epidural steroid injection.  Patient underwent an L4-5 fusion about 5 to 6 years ago with improvement in radicular symptoms at the time but recently over the last few months he started to have recurrence of these radicular symptoms down both legs to his calves and the bottom of his feet.  He is doing physical therapy without much improvement in symptoms.  He does take gabapentin 600 3 times daily with improvement in symptoms.  He has previously had steroid injections which have helped significantly but his previous pain management physician retired.  Some weakness with walking, no bowel or bladder changes, some numbness as well to the bottom of his feet.              Oswestry disability index score: 30%    Monitoring and compliance:    ORT:    PDUQ:    Office Agreement:      Review of Systems     Current Outpatient Medications   Medication Instructions    acetaminophen (TYLENOL) 325-650 mg, oral, Every 8 hours PRN, every 4-6 hours as needed    allopurinol (ZYLOPRIM) 300 mg, oral, Daily    amLODIPine (NORVASC) 5 mg, oral, Daily    aspirin 81 mg, oral, Daily    ferrous sulfate 325 (65 Fe) MG EC tablet 1 tablet, oral, Daily with breakfast, Do not crush, chew, or split.    furosemide (LASIX) 20 mg, oral, Daily    gabapentin (NEURONTIN) 600 mg, oral, 3 times daily    metoprolol succinate XL (TOPROL-XL) 100 mg, oral, Daily, Do not crush or chew.    pantoprazole (PROTONIX) 40 mg, oral, 2 times daily, Do not crush, chew, or split.    potassium chloride CR (Klor-Con M20) 20 mEq ER tablet 20 mEq, oral, Daily, Do not crush or chew.    rosuvastatin (CRESTOR) 40 mg, oral, Nightly        Past Medical History:   Diagnosis Date    Atherosclerotic heart disease of native coronary artery without angina pectoris 10/25/2022    Coronary atherosclerosis    Disorder of  prostate, unspecified     Prostate disorder    Essential (primary) hypertension 12/02/2020    Hypertension    Hyperlipidemia, unspecified 10/25/2022    Hyperlipidemia    Occlusion and stenosis of unspecified carotid artery 10/25/2022    Carotid artery calcification    Personal history of other diseases of urinary system     H/O bladder problems        Past Surgical History:   Procedure Laterality Date    CORONARY ARTERY BYPASS GRAFT  11/09/2015    CABG    OTHER SURGICAL HISTORY  02/24/2014    PTA Carotid Artery    OTHER SURGICAL HISTORY  02/01/2016    Previous Stent Placement        Family History   Problem Relation Name Age of Onset    Coronary artery disease Brother          No Known Allergies     Objective     Vitals:    08/27/24 1138   BP: 173/66   Pulse: 64   Resp: 16        Physical Exam    GENERAL EXAM  Vital Signs: Vital signs to include heart rate, respiration rate, blood pressure, and temperature were reviewed.  General Appearance:  Awake, alert, healthy appearing, well developed, No acute distress.  Head: Normocephalic without evidence of head injury.  Neck: The appearance of the neck was normal without swelling with a midline trachea.  Eyes: The eyelids and eyebrows exhibited no abnormalities.  Pupils were not pin-point.  Sclera was without icterus.  Lungs: Respiration rhythm and depth was normal.  Respiratory movements were normal without labored breathing.  Cardiovascular: No peripheral edema was present.    Neurological: Patient was oriented to time, place, and person.  Speech was normal.  Balance, gait, and stance were unremarkable.    Psychiatric: Appearance was normal with appropriate dress.  Mood was euthymic and affect was normal.  Skin: Affected regions were without ecchymosis or skin lesions.    Back (MSK/neuro/skin):  Full active and passive range of motion in all planes  Strength 5 out of 5 bilateral lower extremities  Sensation to light-touch grossly intact bilateral lower extremities  Deep  tendon reflexes equal bilateral lower extremities  No edema/effusion/erythema  Skin: no skin lesions or scars  B SLR (-)  B sacral spring test (-),   B facet load (-)  B SIJ tenderness (-)   B RY (-)  Full flexion/extension  No TTP, no muscle spasm over lumbar paraspinals    Physical exam as above except:  Well-healed midline surgical incision.  Tenderness to palpation over lower lumbar paraspinals.  Positive straight leg raise bilaterally.      Assessment/Plan   Diagnoses and all orders for this visit:  Post laminectomy syndrome  -     FL pain management; Future  -     Epidural Steroid Injection; Future  Lumbar radiculopathy  -     Referral to Pain Management  Other orders  -     NPO Diet Except: Sips with meds; Effective now; Standing  -     Height and weight; Standing  -     Insert and maintain peripheral IV; Standing  -     Saline lock IV; Standing  -     POCT Glucose; Standing  -     lactated Ringer's infusion  -     diazePAM (Valium) tablet 5 mg  -     Adult diet Regular; Standing  -     Vital Signs; Standing  -     Notify physician - Standard Parameters; Standing  -     Continue IV fluids ordered pre-procedure; Standing  -     Prior to Discharge O2 Weaning; Standing  -     Pulse oximetry, continuous; Standing  -     Discharge patient; Standing    84-year-old male with history of L4-5 fusion with persistent lower extremity radicular symptoms.  I did personally review his x-ray of his lumbar spine showing the fusion as well as some fairly significant degenerative changes at L3-4 above his fusion.  Symptoms consistent with postlaminectomy syndrome.  He has failed conservative treatment to include medications as well as physical therapy.  It would be reasonable move forward with initial caudal epidural steroid injection.  Our plan for today:  - We will schedule patient for caudal epidural steroid injection  We discussed the risks, benefits and alternatives to the procedure and the patient would like to  proceed.  - Continue with gabapentin 600mg 3 times daily.  - Continue with physical therapy.  - Could consider the addition of duloxetine in the future.  - If he does not have durable relief from the initial caudal epidural steroid injection we will likely obtain updated MRI in preparation for potential transforaminal epidural steroid injection  - Could consider spinal cord stimulation as a long-term option as well.       This note was generated with the aid of dictation software, there may be typos despite my attempts at proofreading.

## 2024-08-28 ENCOUNTER — CLINICAL SUPPORT (OUTPATIENT)
Dept: AUDIOLOGY | Facility: CLINIC | Age: 84
End: 2024-08-28

## 2024-08-28 ENCOUNTER — TREATMENT (OUTPATIENT)
Dept: PHYSICAL THERAPY | Facility: CLINIC | Age: 84
End: 2024-08-28
Payer: MEDICARE

## 2024-08-28 DIAGNOSIS — H74.8X2 STIFF MIDDLE EAR TRANSMISSION OF LEFT EAR, TYPE A-S: ICD-10-CM

## 2024-08-28 DIAGNOSIS — M54.16 LUMBAR RADICULOPATHY: ICD-10-CM

## 2024-08-28 DIAGNOSIS — R26.2 DIFFICULTY WALKING: ICD-10-CM

## 2024-08-28 DIAGNOSIS — H90.6 MIXED CONDUCTIVE AND SENSORINEURAL HEARING LOSS, BILATERAL: Primary | ICD-10-CM

## 2024-08-28 DIAGNOSIS — H93.19 SUBJECTIVE TINNITUS, UNSPECIFIED LATERALITY: ICD-10-CM

## 2024-08-28 DIAGNOSIS — R94.128 FLAT TYMPANOGRAM OF RIGHT EAR: ICD-10-CM

## 2024-08-28 PROCEDURE — V5264 EAR MOLD/INSERT: HCPCS | Mod: RT,AUDSP | Performed by: AUDIOLOGIST

## 2024-08-28 PROCEDURE — 97530 THERAPEUTIC ACTIVITIES: CPT | Mod: GP | Performed by: PHYSICAL THERAPIST

## 2024-08-28 PROCEDURE — 97110 THERAPEUTIC EXERCISES: CPT | Mod: GP | Performed by: PHYSICAL THERAPIST

## 2024-08-28 PROCEDURE — 92567 TYMPANOMETRY: CPT | Performed by: AUDIOLOGIST

## 2024-08-28 PROCEDURE — 92557 COMPREHENSIVE HEARING TEST: CPT | Performed by: AUDIOLOGIST

## 2024-08-28 ASSESSMENT — PAIN - FUNCTIONAL ASSESSMENT
PAIN_FUNCTIONAL_ASSESSMENT: 0-10
PAIN_FUNCTIONAL_ASSESSMENT: 0-10

## 2024-08-28 ASSESSMENT — PAIN SCALES - GENERAL
PAINLEVEL_OUTOF10: 5 - MODERATE PAIN
PAINLEVEL_OUTOF10: 0 - NO PAIN

## 2024-08-28 NOTE — PROGRESS NOTES
José Miguel Davidson M30-SP BTEs fit binaurally (warranty expires 11-):  Right serial #: [id number]  Left serial #: [id number]  Size 13 hard wall dri tubing  Custom earmolds     Herminio Devlin, age 84 years, is here today for a hearing evaluation.      Difficulty hearing - yes, since childhood  Tinnitus - yes, unspecified laterality   Excessive noise exposure - yes, occupational   Chronic ear infections - yes, since childhood   Ear pain - no  Ear drainage - no  Past ear surgery - yes, several middle surgeries on both ears (bilateral tympanomastoidectomy)   Vertigo - no  Dizziness - yes, occasionally  Past hearing aid use - yes, both ears for many years  Family history - yes    1) Right earmold impression taken without incident (earhooks replaced bilaterally)  2) Return for right earmold fitting

## 2024-08-28 NOTE — PROGRESS NOTES
José Miguel Cortezida M30-SP BTEs fit binaurally (warranty expires 11-):  Right serial #: [id number]  Left serial #: [id number]  Custom earmolds  Size 13 hard wall dri tariq Devlin, age 84 years, is here today for a hearing evaluation.     Difficulty hearing - yes, since childhood  Tinnitus - yes, unspecified laterality   Excessive noise exposure - yes, occupational   Chronic ear infections - yes, since childhood   Ear pain - no  Ear drainage - no  Past ear surgery - yes, several middle surgeries on both ears (bilateral tympanomastoidectomy)   Vertigo - no  Dizziness - yes, occasionally  Past hearing aid use - yes, both ears for many years  Family history - yes    Appointment time: 9-10    Otoscopy revealed clear ear canals with visual inspection of the tympanic membranes bilaterally.    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    Word discrimination:  Right ear - poor (60%)  Left ear - poor (68%)  *decrease bilaterally since 2022    Tympanometry:  Right ear - Type B, no pressure peak with a normal ear canal volume (indicative of middle ear fluid)  Left ear - Type A-As, eardrum hypomobility with normal ear canal volume and normal middle ear pressure    Ipsilateral acoustic reflexes:  Probe right - absent 500-4000 Hz  Probe left - absent 500-4000 Hz    Recommendations:  1) Follow up twice per year as indicated by Dr Hurtado (ENT) - Type B tympanogram should be addressed  2) Return for right earmold fitting  3) Consistent hearing aid use  4) Re-evaluate hearing annually, to monitor, or sooner if a change in hearing is noted

## 2024-08-28 NOTE — PROGRESS NOTES
Physical Therapy Treatment    Patient Name: Herminio Devlin  MRN: [id number]  Today's Date: 8/28/2024    Current Problem  Problem List Items Addressed This Visit             ICD-10-CM    Lumbar radiculopathy M54.16    Difficulty walking R26.2       Insurance:  Payor: MEDICARE / Plan: MEDICARE PART A AND B / Product Type: *No Product type* /   Number of Treatments Authorized: 2/MN  Certification Period Start Date: 08/20/24  Certification Period End Date: 11/18/24    Subjective :   General  Reason for Referral: LBP  Referred By: Nicholas Pardo Md  Past Medical History Relevant to Rehab: Recent episodes of CHF that required hospitalization x 2 in June of 2024, Lumbar surgery ~ 5-6 years ago. (L4-5 fusion)    Performing HEP?: Yes    Precautions     Pain  Pain Assessment: 0-10  0-10 (Numeric) Pain Score: 5 - Moderate pain  Pain Type: Chronic pain  Pain Location: Back  Pain Orientation: Lower    Objective   Treatments:    Therapeutic Exercise  Therapeutic Exercise Activity 1: SciFit: 6 min L 2  Therapeutic Exercise Activity 2: Calf stretch: slantboard 1 min  Therapeutic Exercise Activity 3: Heel/ toe raise: x 20  Therapeutic Exercise Activity 4: Standing hip ABD: x 10 -B  Therapeutic Exercise Activity 5: Standing hip Ext: x 10 -B  Therapeutic Exercise Activity 6: Standing mini-squat: x 10  Therapeutic Exercise Activity 7: HLTR x 1 min  Therapeutic Exercise Activity 8: SKTC: 5 hold x 5 - B  Therapeutic Exercise Activity 9: Bridges: 2 x 10  Therapeutic Exercise Activity 10: Clam iso's x 1 min ea.  Therapeutic Exercise Activity 11: Trunk Flexion: Yellow ball - 1 min     Therapeutic Activity  Therapeutic Activity 1: Dynamics: marches, butt kicks, side steps with CGA    OP EDUCATION:  Outpatient Education  Education Comment: Access Code: [email or web address]  URL: [email or web address]  Date: 08/28/2024  Prepared by: Issac Boothe    Exercises  - Supine Bridge  - 1-2 x daily - 5-6 x weekly - 1-2 sets - 10 reps  - Supine  Lower Trunk Rotation  - 1-2 x daily - 5-6 x weekly - 1-2 sets - 10 reps    Assessment:  PT Assessment  Assessment Comment: Patient tolerated session well. No pain complaints during exercises. Continues to be guarded with transfers and gait. Required CGA during dynamics exercises due to slight LOB. Will benefit from continued therapy to address pain and weakness.    Plan:  OP PT Plan  Treatment/Interventions: Education/ Instruction, Gait training, Manual therapy, Neuromuscular re-education, Taping techniques, Therapeutic activities, Therapeutic exercises, Self care/ home management  PT Plan: Skilled PT  PT Frequency: 2 times per week  Duration: 6 weeks  Onset Date: 06/16/24  Certification Period Start Date: 08/20/24  Certification Period End Date: 11/18/24  Number of Treatments Authorized: 2/MN  Rehab Potential: Good  Plan of Care Agreement: Patient    Goals:  Active       PT Problem       PT Goal 1       Start:  08/20/24    Expected End:  11/18/24       STG  1) Patient will improve Oswestry Low Back Disability Questionnaire by 5% in order to indicate a measurable improvement in daily function and ability to complete daily tasks in 3 weeks.  2) Patient will be able to complete standing ADLs with pain less than 5/10 in lumbar region in 4 weeks.  3) Patient will perform 5 x sit to  less than 15 seconds to demonstrate increased LE strength for safe home access and transfers in 4 weeks.    4) Patient will be independent with HEP to allow for continued improvement in daily tasks at home and in the community in 3 visits.   LTG  1) Patient will have 4/5 strength in lateral hip musculature to aid in stability with ambulation on varied surfaces in community in 6 weeks  2) Patient will increase hip IR ROM to equal opposite hip for ease of functional movements with less pain including supine to sit transfers in order to prevent increased pain with daily tasks in 6 weeks.  3) Patient will be able to perform TUG in less  than 15 seconds in order to allow for safe ambulation and to demonstrate reduced fall risk within the community in 6 weeks.   4) Patient will improve Oswestry Low Back Disability Questionnaire to </=15% in order to indicate a measurable improvement in daily function and ability to complete daily tasks in 6 weeks.               Time Calculation  Start Time: 1000  Stop Time: 1045  Time Calculation (min): 45 min  PT Therapeutic Procedures Time Entry  Therapeutic Exercise Time Entry: 30  Therapeutic Activity Time Entry: 8,

## 2024-08-30 ENCOUNTER — APPOINTMENT (OUTPATIENT)
Dept: PHYSICAL THERAPY | Facility: CLINIC | Age: 84
End: 2024-08-30
Payer: MEDICARE

## 2024-09-03 ENCOUNTER — TREATMENT (OUTPATIENT)
Dept: PHYSICAL THERAPY | Facility: CLINIC | Age: 84
End: 2024-09-03
Payer: MEDICARE

## 2024-09-03 DIAGNOSIS — R26.2 DIFFICULTY WALKING: ICD-10-CM

## 2024-09-03 DIAGNOSIS — M54.16 LUMBAR RADICULOPATHY: ICD-10-CM

## 2024-09-03 PROCEDURE — 97110 THERAPEUTIC EXERCISES: CPT | Mod: GP | Performed by: PHYSICAL THERAPIST

## 2024-09-03 ASSESSMENT — PAIN - FUNCTIONAL ASSESSMENT: PAIN_FUNCTIONAL_ASSESSMENT: 0-10

## 2024-09-03 ASSESSMENT — PAIN SCALES - GENERAL: PAINLEVEL_OUTOF10: 4

## 2024-09-03 NOTE — PROGRESS NOTES
"Physical Therapy Treatment    Patient Name: Herminio Devlin  MRN: [id number]  Today's Date: 9/3/2024    Current Problem  Problem List Items Addressed This Visit             ICD-10-CM    Lumbar radiculopathy M54.16    Difficulty walking R26.2       Insurance:  Payor: MEDICARE / Plan: MEDICARE PART A AND B / Product Type: *No Product type* /   Number of Treatments Authorized: 2/MN  Certification Period Start Date: 08/20/24  Certification Period End Date: 11/18/24    Subjective   General  Reason for Referral: LBP  Referred By: Nicholas Pardo Md  Past Medical History Relevant to Rehab: Recent episodes of CHF that required hospitalization x 2 in June of 2024, Lumbar surgery ~ 5-6 years ago. (L4-5 fusion)  General Comment: Schedueled for an injection on 9/18/24.  States he is doing a little better - about 10%.    Performing HEP?: Yes    Pain  Pain Assessment: 0-10  0-10 (Numeric) Pain Score: 4  Pain Type: Chronic pain  Pain Location: Back  Pain Orientation: Lower    Objective   Treatments:    Therapeutic Exercise  Therapeutic Exercise Activity 1: SciFit: 8 min L 2  Therapeutic Exercise Activity 2: Calf stretch: slantboard 1 min  Therapeutic Exercise Activity 3: Heel/ toe raise: x 20  Therapeutic Exercise Activity 4: March in place: 2# - 1 min  Therapeutic Exercise Activity 5: Standing hamstring curl: 2#  - x 10  Therapeutic Exercise Activity 6: Standing hip abd: 2# -  x 10  Therapeutic Exercise Activity 7: Standing hip ext: 2# - x 10  Therapeutic Exercise Activity 8: Dynamics: marches, side steps, butt kicks  Therapeutic Exercise Activity 9: Bridges: 2 x 10  Therapeutic Exercise Activity 10: Supine Sball roll fwd/bwk x 1 min  Therapeutic Exercise Activity 11: Clamshell: GTB x 2 min  Therapeutic Exercise Activity 12: HLTR: 1 min  Therapeutic Exercise Activity 13: Seated trunk flexion: Yellow 75 cm ball x 1 min  Therapeutic Exercise Activity 14: Sit to stand: 20\" - 2 x 10    Assessment:  PT Assessment  Assessment Comment: " Patient tolerated session well. No increased pain complaints during exercises. Continues to be guarded with transfers and gait. Required CGA during dynamics exercises due to slight LOB. Will benefit from continued therapy to address pain and weakness.    Plan:  OP PT Plan  Treatment/Interventions: Education/ Instruction, Gait training, Manual therapy, Neuromuscular re-education, Taping techniques, Therapeutic activities, Therapeutic exercises, Self care/ home management  PT Plan: Skilled PT  PT Frequency: 2 times per week  Duration: 6 weeks  Onset Date: 06/16/24  Certification Period Start Date: 08/20/24  Certification Period End Date: 11/18/24  Number of Treatments Authorized: 2/MN  Rehab Potential: Good  Plan of Care Agreement: Patient    Goals:  Active       PT Problem       PT Goal 1       Start:  08/20/24    Expected End:  11/18/24       STG  1) Patient will improve Oswestry Low Back Disability Questionnaire by 5% in order to indicate a measurable improvement in daily function and ability to complete daily tasks in 3 weeks.  2) Patient will be able to complete standing ADLs with pain less than 5/10 in lumbar region in 4 weeks.  3) Patient will perform 5 x sit to  less than 15 seconds to demonstrate increased LE strength for safe home access and transfers in 4 weeks.    4) Patient will be independent with HEP to allow for continued improvement in daily tasks at home and in the community in 3 visits.   LTG  1) Patient will have 4/5 strength in lateral hip musculature to aid in stability with ambulation on varied surfaces in community in 6 weeks  2) Patient will increase hip IR ROM to equal opposite hip for ease of functional movements with less pain including supine to sit transfers in order to prevent increased pain with daily tasks in 6 weeks.  3) Patient will be able to perform TUG in less than 15 seconds in order to allow for safe ambulation and to demonstrate reduced fall risk within the community in  6 weeks.   4) Patient will improve Oswestry Low Back Disability Questionnaire to </=15% in order to indicate a measurable improvement in daily function and ability to complete daily tasks in 6 weeks.               Time Calculation  Start Time: 1215  Stop Time: 1258  Time Calculation (min): 43 min  PT Therapeutic Procedures Time Entry  Therapeutic Exercise Time Entry: 40,

## 2024-09-05 ENCOUNTER — TREATMENT (OUTPATIENT)
Dept: PHYSICAL THERAPY | Facility: CLINIC | Age: 84
End: 2024-09-05
Payer: MEDICARE

## 2024-09-05 DIAGNOSIS — M54.16 LUMBAR RADICULOPATHY: Primary | ICD-10-CM

## 2024-09-05 DIAGNOSIS — R26.2 DIFFICULTY WALKING: ICD-10-CM

## 2024-09-05 PROCEDURE — 97110 THERAPEUTIC EXERCISES: CPT | Mod: GP,CQ

## 2024-09-05 PROCEDURE — 97112 NEUROMUSCULAR REEDUCATION: CPT | Mod: GP,CQ

## 2024-09-05 ASSESSMENT — PAIN SCALES - GENERAL: PAINLEVEL_OUTOF10: 4

## 2024-09-05 ASSESSMENT — PAIN - FUNCTIONAL ASSESSMENT: PAIN_FUNCTIONAL_ASSESSMENT: 0-10

## 2024-09-05 NOTE — PROGRESS NOTES
"  Physical Therapy Treatment    Patient Name: Herminio Devlin  MRN: [id number]  Today's Date: 9/5/2024  Time Calculation  Start Time: 1020  Stop Time: 1102  Time Calculation (min): 42 min  PT Therapeutic Procedures Time Entry  Neuromuscular Re-Education Time Entry: 8  Therapeutic Exercise Time Entry: 28  Therapeutic Activity Time Entry: 5,      Current Problem  1. Lumbar radiculopathy  Follow Up In Physical Therapy      2. Difficulty walking  Follow Up In Physical Therapy          Insurance:  Number of Treatments Authorized: 4/MN  Certification Period Start Date: 08/20/24  Certification Period End Date: 11/18/24      Subjective   General  Reason for Referral: LBP  Referred By: Nicholas Pardo Md  Past Medical History Relevant to Rehab: Recent episodes of CHF that required hospitalization x 2 in June of 2024, Lumbar surgery ~ 5-6 years ago. (L4-5 fusion)  General Comment: Patient notes slow progress, but improving.  Injection scheduled for 9/18/24.    Performing HEP?: Partially - every other day    Precautions     Pain  Pain Assessment: 0-10  0-10 (Numeric) Pain Score: 4  Pain Type: Chronic pain  Pain Location: Back  Pain Orientation: Lower    Objective   FF posture    Treatments:    Therapeutic Exercise  Therapeutic Exercise Activity 1: SciFit: 6 min L 2  Therapeutic Exercise Activity 2: Calf stretch: slantboard 1 min  Therapeutic Exercise Activity 3: Heel/ toe raise: x 20  Therapeutic Exercise Activity 4: seated with sm yellow ball at thoracic spine - gentle extesnion with pec stretch x 10  Therapeutic Exercise Activity 5: tall sitting - yellow PB rows 2 x 10  Therapeutic Exercise Activity 6: tall sitting yellow PB shld ext 2 x 10  Therapeutic Exercise Activity 7: Seated trunk flexion: Yellow 75 cm ball x 1 min  Therapeutic Exercise Activity 8: Seated trunk rotation with 75cm SB x 10 ea  Therapeutic Exercise Activity 9: supine iso glut squeeze 5\" x 10  Therapeutic Exercise Activity 10: supine lying R/L SKTC 10\" x 5 " ea  Therapeutic Exercise Activity 11: HL R/L iso clam with GTB 2 x 10 ea  Therapeutic Exercise Activity 12: LTR x 1 min    Balance/Neuromuscular Re-Education  Balance/Neuromuscular Re-Education Activity 1: wide NIEVES on airex pad with R/L weight shift with pause w/o UE x 1 min  Balance/Neuromuscular Re-Education Activity 2: NBOS on airex pad w/o UE x 1 min  Balance/Neuromuscular Re-Education Activity 3: NBOS on airex pad with 2# ball lift x 10         Therapeutic Activity  Therapeutic Activity 1: Dynamics: marches, BWD 2 x 40' ea, side steps R/L x 40' ea  Therapeutic Activity 2: sit to stand from chair w/o UE x 10                OP EDUCATION:       Assessment:  PT Assessment  Assessment Comment: Today's session focused on regaining more upright posture against gravity. Patient entered the department in a FF position.  Cueing to look up when performing standing exercise.  Challenged with airex balance exercises.    Plan:  OP PT Plan  Treatment/Interventions: Education/ Instruction, Gait training, Manual therapy, Neuromuscular re-education, Taping techniques, Therapeutic activities, Therapeutic exercises, Self care/ home management  PT Plan: Skilled PT  PT Frequency: 2 times per week  Duration: 6 weeks  Onset Date: 06/16/24  Certification Period Start Date: 08/20/24  Certification Period End Date: 11/18/24  Number of Treatments Authorized: 4/MN  Rehab Potential: Good  Plan of Care Agreement: Patient    Goals:  Active       PT Problem       PT Goal 1       Start:  08/20/24    Expected End:  11/18/24       STG  1) Patient will improve Oswestry Low Back Disability Questionnaire by 5% in order to indicate a measurable improvement in daily function and ability to complete daily tasks in 3 weeks.  2) Patient will be able to complete standing ADLs with pain less than 5/10 in lumbar region in 4 weeks.  3) Patient will perform 5 x sit to  less than 15 seconds to demonstrate increased LE strength for safe home access and  transfers in 4 weeks.    4) Patient will be independent with HEP to allow for continued improvement in daily tasks at home and in the community in 3 visits.   LTG  1) Patient will have 4/5 strength in lateral hip musculature to aid in stability with ambulation on varied surfaces in community in 6 weeks  2) Patient will increase hip IR ROM to equal opposite hip for ease of functional movements with less pain including supine to sit transfers in order to prevent increased pain with daily tasks in 6 weeks.  3) Patient will be able to perform TUG in less than 15 seconds in order to allow for safe ambulation and to demonstrate reduced fall risk within the community in 6 weeks.   4) Patient will improve Oswestry Low Back Disability Questionnaire to </=15% in order to indicate a measurable improvement in daily function and ability to complete daily tasks in 6 weeks.               Sujatha Hart, PTA

## 2024-09-10 ENCOUNTER — APPOINTMENT (OUTPATIENT)
Dept: PHYSICAL THERAPY | Facility: CLINIC | Age: 84
End: 2024-09-10
Payer: MEDICARE

## 2024-09-10 DIAGNOSIS — M54.16 LUMBAR RADICULOPATHY: Primary | ICD-10-CM

## 2024-09-10 DIAGNOSIS — R26.2 DIFFICULTY WALKING: ICD-10-CM

## 2024-09-11 ENCOUNTER — CLINICAL SUPPORT (OUTPATIENT)
Dept: AUDIOLOGY | Facility: CLINIC | Age: 84
End: 2024-09-11
Payer: MEDICARE

## 2024-09-11 DIAGNOSIS — R94.128 FLAT TYMPANOGRAM OF RIGHT EAR: ICD-10-CM

## 2024-09-11 DIAGNOSIS — H74.8X2 STIFF MIDDLE EAR TRANSMISSION OF LEFT EAR, TYPE A-S: ICD-10-CM

## 2024-09-11 DIAGNOSIS — H90.6 MIXED CONDUCTIVE AND SENSORINEURAL HEARING LOSS, BILATERAL: Primary | ICD-10-CM

## 2024-09-11 DIAGNOSIS — H93.19 SUBJECTIVE TINNITUS, UNSPECIFIED LATERALITY: ICD-10-CM

## 2024-09-11 PROCEDURE — HRANC PR HEARING AID NO CHARGE: Performed by: AUDIOLOGIST

## 2024-09-11 ASSESSMENT — PAIN - FUNCTIONAL ASSESSMENT: PAIN_FUNCTIONAL_ASSESSMENT: 0-10

## 2024-09-11 ASSESSMENT — PAIN SCALES - GENERAL: PAINLEVEL_OUTOF10: 0 - NO PAIN

## 2024-09-11 NOTE — PROGRESS NOTES
Phonak Lety M30-SP BTEs fit binaurally (warranty expires 11-):  Right serial #: [id number]  Left serial #: [id number]  Size 13 hard wall dri tubing  Custom earmolds     Herminio Devlin, age 84 years, is here today for a right earmold fitting.      Difficulty hearing - yes, since childhood  Tinnitus - yes, unspecified laterality   Excessive noise exposure - yes, occupational   Chronic ear infections - yes, since childhood   Ear pain - no  Ear drainage - no  Past ear surgery - yes, several middle surgeries on both ears (bilateral tympanomastoidectomy)   Vertigo - no  Dizziness - yes, occasionally  Past hearing aid use - yes, both ears for many years  Family history - yes     1) New right earmold coupled to his right Lety M30-SP.  Patient report and visual inspection suggest a good fit.    Recommendations:  1) Annual hearing evaluation  2) ENT follow ups as indicated  3) Consistent hearing aid use during waking hours

## 2024-09-12 ENCOUNTER — TREATMENT (OUTPATIENT)
Dept: PHYSICAL THERAPY | Facility: CLINIC | Age: 84
End: 2024-09-12
Payer: MEDICARE

## 2024-09-12 DIAGNOSIS — M54.16 LUMBAR RADICULOPATHY: Primary | ICD-10-CM

## 2024-09-12 DIAGNOSIS — R26.2 DIFFICULTY WALKING: ICD-10-CM

## 2024-09-12 PROCEDURE — 97110 THERAPEUTIC EXERCISES: CPT | Mod: GP,CQ

## 2024-09-12 PROCEDURE — 97112 NEUROMUSCULAR REEDUCATION: CPT | Mod: GP,CQ

## 2024-09-12 ASSESSMENT — PAIN SCALES - GENERAL: PAINLEVEL_OUTOF10: 5 - MODERATE PAIN

## 2024-09-12 ASSESSMENT — PAIN - FUNCTIONAL ASSESSMENT: PAIN_FUNCTIONAL_ASSESSMENT: 0-10

## 2024-09-12 NOTE — PROGRESS NOTES
"  Physical Therapy Treatment    Patient Name: Herminio Devlin  MRN: [id number]  Today's Date: 9/12/2024  Time Calculation  Start Time: 0936  Stop Time: 1016  Time Calculation (min): 40 min  PT Therapeutic Procedures Time Entry  Neuromuscular Re-Education Time Entry: 12  Therapeutic Exercise Time Entry: 22  Therapeutic Activity Time Entry: 6,      Current Problem  Problem List Items Addressed This Visit             ICD-10-CM    Lumbar radiculopathy - Primary M54.16    Difficulty walking R26.2       Insurance:  Number of Treatments Authorized: 5/MN  Certification Period Start Date: 08/20/24  Certification Period End Date: 11/18/24      Subjective   General  Reason for Referral: LBP  Referred By: Nicholas Pardo Md  Past Medical History Relevant to Rehab: Recent episodes of CHF that required hospitalization x 2 in June of 2024, Lumbar surgery ~ 5-6 years ago. (L4-5 fusion)  General Comment: My back has been bothering the heck out of me.  Can't wait until I get the shot (9/18/24).    Performing HEP?: Yes    Precautions     Pain  Pain Assessment: 0-10  0-10 (Numeric) Pain Score: 5 - Moderate pain  Pain Type: Chronic pain  Pain Location: Back  Pain Orientation: Lower    Objective   Arrived with posture FF at the waist    Treatments:    Therapeutic Exercise  Therapeutic Exercise Activity 1: SciFit: 6 min L 2  Therapeutic Exercise Activity 2: Calf stretch: slantboard 1 min  Therapeutic Exercise Activity 3: Heel/ toe raise: x 20  Therapeutic Exercise Activity 4: standing R/L HS stretch x 30\" ea  Therapeutic Exercise Activity 5: standing R/L HF stretch 2 x 30\" ea  Therapeutic Exercise Activity 6: standing YTB loop R/L hip ABD x 10 ea  Therapeutic Exercise Activity 7: standing YTB loop R/L hip EXT x 10 ea  Therapeutic Exercise Activity 8: seated 75cm SB RO 2 x 10  Therapeutic Exercise Activity 9: unsupported siting yelloy PB rows 2 x 10  Therapeutic Exercise Activity 10: T Gym L5 squats 2 x 10    Balance/Neuromuscular " "Re-Education  Balance/Neuromuscular Re-Education Activity 1: DLB firm surface with alt foot tap and pause on 8\" step w/o UE x 2 min  Balance/Neuromuscular Re-Education Activity 2: R/L SLS on firm surface 2 x 30\" ea  Balance/Neuromuscular Re-Education Activity 3: R/L lateral lift over 6\" tala with pause and return x 10 ea         Therapeutic Activity  Therapeutic Activity 1: R/L lateral stepping over 5 - 6\" hurdles R/L x 2 passes each with CGA  Therapeutic Activity 2: FWD step over 5 - 6\" tala step to pattern R/L lead x 2 passes each with CGA                OP EDUCATION:       Assessment:  PT Assessment  Assessment Comment: Today's session focused on LE strengthening, flexibility and stabilization.  He was challenged with tala step overs.  1 incidince of LOB with FWD step overs with therapist assist to regain balance.  Patient's LBP was reduced and his standing posture was mor upright at the conclusion of today's session.    Plan:  OP PT Plan  Treatment/Interventions: Education/ Instruction, Gait training, Manual therapy, Neuromuscular re-education, Taping techniques, Therapeutic activities, Therapeutic exercises, Self care/ home management  PT Plan: Skilled PT  PT Frequency: 2 times per week  Duration: 6 weeks  Onset Date: 06/16/24  Certification Period Start Date: 08/20/24  Certification Period End Date: 11/18/24  Number of Treatments Authorized: 5/MN  Rehab Potential: Good  Plan of Care Agreement: Patient    Goals:  Active       PT Problem       PT Goal 1       Start:  08/20/24    Expected End:  11/18/24       STG  1) Patient will improve Oswestry Low Back Disability Questionnaire by 5% in order to indicate a measurable improvement in daily function and ability to complete daily tasks in 3 weeks.  2) Patient will be able to complete standing ADLs with pain less than 5/10 in lumbar region in 4 weeks.  3) Patient will perform 5 x sit to  less than 15 seconds to demonstrate increased LE strength for " safe home access and transfers in 4 weeks.    4) Patient will be independent with HEP to allow for continued improvement in daily tasks at home and in the community in 3 visits.   LTG  1) Patient will have 4/5 strength in lateral hip musculature to aid in stability with ambulation on varied surfaces in community in 6 weeks  2) Patient will increase hip IR ROM to equal opposite hip for ease of functional movements with less pain including supine to sit transfers in order to prevent increased pain with daily tasks in 6 weeks.  3) Patient will be able to perform TUG in less than 15 seconds in order to allow for safe ambulation and to demonstrate reduced fall risk within the community in 6 weeks.   4) Patient will improve Oswestry Low Back Disability Questionnaire to </=15% in order to indicate a measurable improvement in daily function and ability to complete daily tasks in 6 weeks.               Sujatha Hart, PTA

## 2024-09-17 ENCOUNTER — TREATMENT (OUTPATIENT)
Dept: PHYSICAL THERAPY | Facility: CLINIC | Age: 84
End: 2024-09-17
Payer: MEDICARE

## 2024-09-17 DIAGNOSIS — M54.16 LUMBAR RADICULOPATHY: ICD-10-CM

## 2024-09-17 DIAGNOSIS — R26.2 DIFFICULTY WALKING: ICD-10-CM

## 2024-09-17 PROCEDURE — 97110 THERAPEUTIC EXERCISES: CPT | Mod: GP | Performed by: PHYSICAL THERAPIST

## 2024-09-17 ASSESSMENT — PAIN - FUNCTIONAL ASSESSMENT: PAIN_FUNCTIONAL_ASSESSMENT: 0-10

## 2024-09-17 ASSESSMENT — PAIN SCALES - GENERAL: PAINLEVEL_OUTOF10: 3

## 2024-09-17 NOTE — H&P
Pain Management H&P    History Of Present Illness  Herminio Devlin is a 84 y.o. male presents for procedure state below. Endorses no changes in past medical history or medical health since last seen in clinic.      Past Medical History  He has a past medical history of Atherosclerotic heart disease of native coronary artery without angina pectoris (10/25/2022), Disorder of prostate, unspecified, Essential (primary) hypertension (12/02/2020), Hyperlipidemia, unspecified (10/25/2022), Occlusion and stenosis of unspecified carotid artery (10/25/2022), and Personal history of other diseases of urinary system.    Surgical History  He has a past surgical history that includes Other surgical history (02/24/2014); Coronary artery bypass graft (11/09/2015); and Other surgical history (02/01/2016).     Social History  He reports that he quit smoking about 30 years ago. His smoking use included cigarettes. He started smoking about 71 years ago. He has a 41 pack-year smoking history. He has been exposed to tobacco smoke. He has never used smokeless tobacco. He reports that he does not drink alcohol and does not use drugs.    Family History  Family History   Problem Relation Name Age of Onset    Coronary artery disease Brother          Allergies  Patient has no known allergies.    Review of Symptoms:   Constitutional: Negative for chills, diaphoresis or fever  HENT: Negative for neck swelling  Eyes:.  Negative for eye pain  Respiratory:.  Negative for cough, shortness of breath or wheezing    Cardiovascular:.  Negative for chest pain or palpitations  Gastrointestinal:.  Negative for abdominal pain, nausea and vomiting  Genitourinary:.  Negative for urgency  Musculoskeletal:  Positive for back pain. Positive for joint pain. Denies falls within the past 3 months.  Skin: Negative for wounds or itching   Neurological: Negative for dizziness, seizures, loss of consciousness and weakness  Endo/Heme/Allergies: Does not bruise/bleed  easily  Psychiatric/Behavioral: Negative for depression. The patient does not appear anxious.       PHYSICAL EXAM  Vitals signs reviewed  Constitutional:       General: Not in acute distress     Appearance: Normal appearance. Not ill-appearing.  HENT:     Head: Normocephalic and atraumatic  Eyes:     Conjunctiva/sclera: Conjunctivae normal  Cardiovascular:     Rate and Rhythm: Normal rate and regular rhythm  Pulmonary:     Effort: No respiratory distress  Abdominal:     Palpations: Abdomen is soft  Musculoskeletal: GRACIA  Skin:     General: Skin is warm and dry  Neurological:     General: No focal deficit present  Psychiatric:         Mood and Affect: Mood normal         Behavior: Behavior normal     Last Recorded Vitals  There were no vitals taken for this visit.    Relevant Results  Current Outpatient Medications   Medication Instructions    acetaminophen (TYLENOL) 325-650 mg, oral, Every 8 hours PRN, every 4-6 hours as needed    allopurinol (ZYLOPRIM) 300 mg, oral, Daily    amLODIPine (NORVASC) 5 mg, oral, Daily    aspirin 81 mg, oral, Daily    ferrous sulfate 325 (65 Fe) MG EC tablet 1 tablet, oral, Daily with breakfast, Do not crush, chew, or split.    furosemide (LASIX) 20 mg, oral, Daily    gabapentin (NEURONTIN) 600 mg, oral, 3 times daily    metoprolol succinate XL (TOPROL-XL) 100 mg, oral, Daily, Do not crush or chew.    pantoprazole (PROTONIX) 40 mg, oral, 2 times daily, Do not crush, chew, or split.    potassium chloride CR (Klor-Con M20) 20 mEq ER tablet 20 mEq, oral, Daily, Do not crush or chew.    rosuvastatin (CRESTOR) 40 mg, oral, Nightly         XR hip right with pelvis when performed 2 or 3 views 01/15/2024    Narrative  Interpreted By:  Herminio Butler,  STUDY:  XR HIP RIGHT WITH PELVIS WHEN PERFORMED 2 OR 3 VIEWS    INDICATION:  Signs/Symptoms:right hip pain, patient has known left hip arthritis    COMPARISON:  Left hip series dated October 17, 2022    ACCESSION  NUMBER(S):  [id number]    ORDERING CLINICIAN:  NATHALY WHEAT    TECHNIQUE:  Views: AP and Lateral of the right hip and AP view of the pelvis    FINDINGS:  RESULT: There is no evidence for fracture or dislocation. There is  joint space narrowing of the bilateral hips with osteophyte formation  of the bilateral acetabula. There are atherosclerotic calcifications  of the bilateral superficial femoral arteries. Pedicle screws and  posterior fusion rods are noted with fusion hardware of the L4 and L5  levels.    Impression  Osteoarthritis of the bilateral hips.    Atherosclerosis.                  Signed by: Herminio Butler 1/15/2024 3:11 PM  Dictation workstation:   [id number]     No image results found.       No diagnosis found.     ASSESSMENT/PLAN  Herminio Devlin is a 84 y.o. male here for caudal epidural steroid injection.    Did not take any BP meds this morning.     Patient denies any recent antibiotic use or infections, denies any blood thinner use, and denies contrast or local anesthetic allergies     Risks, benefits, alternatives discussed. All questions answered to the best of my ability. Patient agrees to proceed.      Our plan is as follows:  - Proceed with aforementioned procedure          Leo Winston DO   Pain fellow

## 2024-09-17 NOTE — PROGRESS NOTES
Physical Therapy Treatment    Patient Name: Herminio Devlin  MRN: [id number]  Today's Date: 9/17/2024    Current Problem  Problem List Items Addressed This Visit             ICD-10-CM    Lumbar radiculopathy M54.16    Difficulty walking R26.2       Insurance:  Payor: MEDICARE / Plan: MEDICARE PART A AND B / Product Type: *No Product type* /   Number of Treatments Authorized: 6/MN  Certification Period Start Date: 08/20/24  Certification Period End Date: 11/18/24    Subjective   General  Reason for Referral: LBP  Referred By: Nicholas Pardo Md  Past Medical History Relevant to Rehab: Recent episodes of CHF that required hospitalization x 2 in June of 2024, Lumbar surgery ~ 5-6 years ago. (L4-5 fusion)  General Comment: To have spinal injection tomorrow. States exercises seems to help some. Walking on TM, riding bike and lifting wts.    Performing HEP?: Yes    Precautions     Pain  Pain Assessment: 0-10  0-10 (Numeric) Pain Score: 3  Pain Type: Chronic pain  Pain Location: Back  Pain Orientation: Lower    Objective   Treatments:    Therapeutic Exercise  Therapeutic Exercise Activity 1: SciFit: 8 min L 2  Therapeutic Exercise Activity 2: Calf stretch: slantboard 1 min  Therapeutic Exercise Activity 3: Heel/ toe raise: x 20  Therapeutic Exercise Activity 4: Seated trunk stretch: 15 hold x 3  Therapeutic Exercise Activity 5: Seated H/S stretch: 30 hold - x 3 bilat  Therapeutic Exercise Activity 6: Dynamics: marches, tin soldier, side steps, butt kicks - 25 ft x 2 ea  Therapeutic Exercise Activity 7: Squats at // bars x 10  Therapeutic Exercise Activity 8: unsupported LAE: YTB 3 x 10  Therapeutic Exercise Activity 9: unsupported siting yellow PB rows 3 x 10    OP EDUCATION:   Continue with HEP    Assessment:  PT Assessment  Assessment Comment: Session focus on functional strength and dynamic balance. Better tolerates seated row exercises vs dynamics.  Has injection tomorrow - to monitor and advance exercise to  tolerance.    Plan:  OP PT Plan  Treatment/Interventions: Education/ Instruction, Gait training, Manual therapy, Neuromuscular re-education, Taping techniques, Therapeutic activities, Therapeutic exercises, Self care/ home management  PT Plan: Skilled PT  PT Frequency: 2 times per week  Duration: 6 weeks  Onset Date: 06/16/24  Certification Period Start Date: 08/20/24  Certification Period End Date: 11/18/24  Number of Treatments Authorized: 6/MN  Rehab Potential: Good  Plan of Care Agreement: Patient    Goals:  Active       PT Problem       PT Goal 1       Start:  08/20/24    Expected End:  11/18/24       STG  1) Patient will improve Oswestry Low Back Disability Questionnaire by 5% in order to indicate a measurable improvement in daily function and ability to complete daily tasks in 3 weeks.  2) Patient will be able to complete standing ADLs with pain less than 5/10 in lumbar region in 4 weeks.  3) Patient will perform 5 x sit to  less than 15 seconds to demonstrate increased LE strength for safe home access and transfers in 4 weeks.    4) Patient will be independent with HEP to allow for continued improvement in daily tasks at home and in the community in 3 visits.   LTG  1) Patient will have 4/5 strength in lateral hip musculature to aid in stability with ambulation on varied surfaces in community in 6 weeks  2) Patient will increase hip IR ROM to equal opposite hip for ease of functional movements with less pain including supine to sit transfers in order to prevent increased pain with daily tasks in 6 weeks.  3) Patient will be able to perform TUG in less than 15 seconds in order to allow for safe ambulation and to demonstrate reduced fall risk within the community in 6 weeks.   4) Patient will improve Oswestry Low Back Disability Questionnaire to </=15% in order to indicate a measurable improvement in daily function and ability to complete daily tasks in 6 weeks.               Time Calculation  Start  Time: 1000  Stop Time: 1045  Time Calculation (min): 45 min  PT Therapeutic Procedures Time Entry  Therapeutic Exercise Time Entry: 40,

## 2024-09-18 ENCOUNTER — HOSPITAL ENCOUNTER (OUTPATIENT)
Dept: OPERATING ROOM | Facility: CLINIC | Age: 84
Setting detail: OUTPATIENT SURGERY
Discharge: HOME | End: 2024-09-18
Payer: MEDICARE

## 2024-09-18 VITALS
RESPIRATION RATE: 20 BRPM | WEIGHT: 176 LBS | BODY MASS INDEX: 29.32 KG/M2 | TEMPERATURE: 98.2 F | HEART RATE: 68 BPM | OXYGEN SATURATION: 100 % | SYSTOLIC BLOOD PRESSURE: 188 MMHG | DIASTOLIC BLOOD PRESSURE: 75 MMHG | HEIGHT: 65 IN

## 2024-09-18 DIAGNOSIS — M96.1 POST LAMINECTOMY SYNDROME: ICD-10-CM

## 2024-09-18 PROCEDURE — 3600000006 HC OR TIME - EACH INCREMENTAL 1 MINUTE - PROCEDURE LEVEL ONE

## 2024-09-18 PROCEDURE — 2500000005 HC RX 250 GENERAL PHARMACY W/O HCPCS: Performed by: PHYSICAL MEDICINE & REHABILITATION

## 2024-09-18 PROCEDURE — 7100000009 HC PHASE TWO TIME - INITIAL BASE CHARGE

## 2024-09-18 PROCEDURE — 2550000001 HC RX 255 CONTRASTS: Performed by: PHYSICAL MEDICINE & REHABILITATION

## 2024-09-18 PROCEDURE — 7100000010 HC PHASE TWO TIME - EACH INCREMENTAL 1 MINUTE

## 2024-09-18 PROCEDURE — 62323 NJX INTERLAMINAR LMBR/SAC: CPT | Performed by: PHYSICAL MEDICINE & REHABILITATION

## 2024-09-18 PROCEDURE — 3600000001 HC OR TIME - INITIAL BASE CHARGE - PROCEDURE LEVEL ONE

## 2024-09-18 PROCEDURE — 2500000004 HC RX 250 GENERAL PHARMACY W/ HCPCS (ALT 636 FOR OP/ED): Performed by: PHYSICAL MEDICINE & REHABILITATION

## 2024-09-18 RX ORDER — DIAZEPAM 5 MG/1
5 TABLET ORAL ONCE AS NEEDED
Status: DISCONTINUED | OUTPATIENT
Start: 2024-09-18 | End: 2024-09-19 | Stop reason: HOSPADM

## 2024-09-18 RX ORDER — SODIUM CHLORIDE, SODIUM LACTATE, POTASSIUM CHLORIDE, CALCIUM CHLORIDE 600; 310; 30; 20 MG/100ML; MG/100ML; MG/100ML; MG/100ML
20 INJECTION, SOLUTION INTRAVENOUS CONTINUOUS
Status: DISCONTINUED | OUTPATIENT
Start: 2024-09-18 | End: 2024-09-19 | Stop reason: HOSPADM

## 2024-09-18 RX ORDER — METHYLPREDNISOLONE ACETATE 40 MG/ML
INJECTION, SUSPENSION INTRA-ARTICULAR; INTRALESIONAL; INTRAMUSCULAR; SOFT TISSUE AS NEEDED
Status: COMPLETED | OUTPATIENT
Start: 2024-09-18 | End: 2024-09-18

## 2024-09-18 RX ORDER — LIDOCAINE HYDROCHLORIDE 5 MG/ML
INJECTION, SOLUTION INFILTRATION; INTRAVENOUS AS NEEDED
Status: COMPLETED | OUTPATIENT
Start: 2024-09-18 | End: 2024-09-18

## 2024-09-18 ASSESSMENT — PAIN SCALES - GENERAL
PAINLEVEL_OUTOF10: 2
PAINLEVEL_OUTOF10: 3
PAINLEVEL_OUTOF10: 2

## 2024-09-18 ASSESSMENT — PAIN - FUNCTIONAL ASSESSMENT
PAIN_FUNCTIONAL_ASSESSMENT: 0-10
PAIN_FUNCTIONAL_ASSESSMENT: 0-10

## 2024-09-18 NOTE — DISCHARGE INSTRUCTIONS
Discharge Instructions for Anesthesiology Pain Service Patients - Dr. Paris    Observe the needle site for excessive bleeding (slow general oozing that completely soaks the dressing or fresh bright red bleeding).  In either case, apply pressure to the area, elevate it if possible and call your doctor at once.    Observe/monitor for the following signs and symptoms:         Needle site:  change in color, numbness or tingling, coldness to touch, swelling, drainage       Temperature of 101.5 or higher       Increased or uncontrollable pain    If you notice any of the above signs or symptoms, please call your doctor at once.    Your pain may not be gone immediately after this procedure; it generally takes 3 to 5 days for the steroid to work.    Keep the needle site clean and dry for 24 hours.    Continue your present medications.    No driving or operating machinery for 24 hours if you have received sedation.    Make an appointment to see you doctor in 2-3 weeks    Central Scheduling Number:  [phone]  Dr. Paris's office:  [phone]  Dr. Paris's Nurse line:  [phone]  After hours Pain Management:  [phone].    If any problems occur, or if you have further questions, please call you doctor as soon as possible.  If you find that you cannot reach your doctor, but feel that your condition needs a doctors attention, go to the  emergency room nearest your home.

## 2024-09-19 ENCOUNTER — PATIENT OUTREACH (OUTPATIENT)
Dept: PRIMARY CARE | Facility: CLINIC | Age: 84
End: 2024-09-19

## 2024-09-19 ENCOUNTER — TREATMENT (OUTPATIENT)
Dept: PHYSICAL THERAPY | Facility: CLINIC | Age: 84
End: 2024-09-19
Payer: MEDICARE

## 2024-09-19 DIAGNOSIS — R26.2 DIFFICULTY WALKING: ICD-10-CM

## 2024-09-19 DIAGNOSIS — M54.16 LUMBAR RADICULOPATHY: Primary | ICD-10-CM

## 2024-09-19 PROCEDURE — 97112 NEUROMUSCULAR REEDUCATION: CPT | Mod: GP,CQ

## 2024-09-19 PROCEDURE — 97530 THERAPEUTIC ACTIVITIES: CPT | Mod: GP,CQ

## 2024-09-19 PROCEDURE — 97110 THERAPEUTIC EXERCISES: CPT | Mod: GP,CQ

## 2024-09-19 ASSESSMENT — PAIN - FUNCTIONAL ASSESSMENT: PAIN_FUNCTIONAL_ASSESSMENT: 0-10

## 2024-09-19 ASSESSMENT — PAIN SCALES - GENERAL: PAINLEVEL_OUTOF10: 3

## 2024-09-19 NOTE — PROGRESS NOTES
Physical Therapy Treatment    Patient Name: Herminio Devlin  MRN: [id number]  Today's Date: 9/19/2024  Time Calculation  Start Time: 1010  Stop Time: 1056  Time Calculation (min): 46 min  PT Therapeutic Procedures Time Entry  Neuromuscular Re-Education Time Entry: 8  Therapeutic Exercise Time Entry: 21  Therapeutic Activity Time Entry: 15,      Current Problem  Problem List Items Addressed This Visit             ICD-10-CM    Lumbar radiculopathy - Primary M54.16    Difficulty walking R26.2       Insurance:  Number of Treatments Authorized: 7/MN  Certification Period Start Date: 08/20/24  Certification Period End Date: 11/18/24      Subjective   General  Reason for Referral: LBP  Referred By: Nicholas Pardo Md  Past Medical History Relevant to Rehab: Recent episodes of CHF that required hospitalization x 2 in June of 2024, Lumbar surgery ~ 5-6 years ago. (L4-5 fusion)  General Comment: Patient had his spinal injection yesterday.  He states his legs and LB feel a bit better today. Notes tingling in his feet is still present.    Performing HEP?: Yes    Precautions     Pain  Pain Assessment: 0-10  0-10 (Numeric) Pain Score: 3    Objective       Treatments:    Therapeutic Exercise  Therapeutic Exercise Activity 1: SciFit Man L3 x 6 min  Therapeutic Exercise Activity 2: Calf stretch: slantboard 1 min  Therapeutic Exercise Activity 3: Heel/ toe raise: x 20  Therapeutic Exercise Activity 4: Seated H/S stretch: 30 hold - x 3 bilat  Therapeutic Exercise Activity 5: RFIS x 10  Therapeutic Exercise Activity 6: Dynamics - march, butt kick, tin soldier x 40' ea mini lunge x 30' w/ CGA  Therapeutic Exercise Activity 7: YTB loop at ankles - lateral walk along // bar 2 x 1 min  Therapeutic Exercise Activity 8: unsupported LAE: YTB 2 x 10  Therapeutic Exercise Activity 9: unsupported siting yellow PB rows 2 x 10    Balance/Neuromuscular Re-Education  Balance/Neuromuscular Re-Education Activity 1: DLB on airex pad with 4# ball press 2 x  "10  Balance/Neuromuscular Re-Education Activity 2: NBOS on airex pad with arms crossed - balance with EC 3 x 10\"; 2 x 15\"         Therapeutic Activity  Therapeutic Activity 1: FWD step up R/L lead x 1 min  Therapeutic Activity 2: Lateral step overs 6\" x 1 min  Therapeutic Activity 3: sit to stand from chair with 1 airex pad x 10                OP EDUCATION:       Assessment:  PT Assessment  Assessment Comment: Patient recieved a spinal injection yesterday with good results. He tolerated today's session well.  Most challenged with dynamic drills.    Plan:  OP PT Plan  Treatment/Interventions: Education/ Instruction, Gait training, Manual therapy, Neuromuscular re-education, Taping techniques, Therapeutic activities, Therapeutic exercises, Self care/ home management  PT Plan: Skilled PT  PT Frequency: 2 times per week  Duration: 6 weeks  Onset Date: 06/16/24  Certification Period Start Date: 08/20/24  Certification Period End Date: 11/18/24  Number of Treatments Authorized: 7/MN  Rehab Potential: Good  Plan of Care Agreement: Patient    Goals:  Active       PT Problem       PT Goal 1       Start:  08/20/24    Expected End:  11/18/24       STG  1) Patient will improve Oswestry Low Back Disability Questionnaire by 5% in order to indicate a measurable improvement in daily function and ability to complete daily tasks in 3 weeks.  2) Patient will be able to complete standing ADLs with pain less than 5/10 in lumbar region in 4 weeks.  3) Patient will perform 5 x sit to  less than 15 seconds to demonstrate increased LE strength for safe home access and transfers in 4 weeks.    4) Patient will be independent with HEP to allow for continued improvement in daily tasks at home and in the community in 3 visits.   LTG  1) Patient will have 4/5 strength in lateral hip musculature to aid in stability with ambulation on varied surfaces in community in 6 weeks  2) Patient will increase hip IR ROM to equal opposite hip for ease " of functional movements with less pain including supine to sit transfers in order to prevent increased pain with daily tasks in 6 weeks.  3) Patient will be able to perform TUG in less than 15 seconds in order to allow for safe ambulation and to demonstrate reduced fall risk within the community in 6 weeks.   4) Patient will improve Oswestry Low Back Disability Questionnaire to </=15% in order to indicate a measurable improvement in daily function and ability to complete daily tasks in 6 weeks.               Sujatha Hart, PTA

## 2024-09-19 NOTE — ADDENDUM NOTE
Encounter addended by: Asmita Olivares RN on: 9/19/2024 10:33 AM   Actions taken: Contacts section saved, Flowsheet accepted

## 2024-09-19 NOTE — PROGRESS NOTES
Call regarding F/U     At time of outreach call the patient feels as if their condition has improved  since last visit.    Encouraged to call providers for any questions concerns or change in condition

## 2024-09-24 ENCOUNTER — APPOINTMENT (OUTPATIENT)
Dept: PHYSICAL THERAPY | Facility: CLINIC | Age: 84
End: 2024-09-24
Payer: MEDICARE

## 2024-09-26 ENCOUNTER — APPOINTMENT (OUTPATIENT)
Dept: PHYSICAL THERAPY | Facility: CLINIC | Age: 84
End: 2024-09-26
Payer: MEDICARE

## 2024-10-02 ENCOUNTER — TELEPHONE (OUTPATIENT)
Dept: PRIMARY CARE | Facility: CLINIC | Age: 84
End: 2024-10-02
Payer: MEDICARE

## 2024-10-02 DIAGNOSIS — D64.9 SYMPTOMATIC ANEMIA: ICD-10-CM

## 2024-10-02 DIAGNOSIS — T39.395A GI BLEED DUE TO NSAIDS: ICD-10-CM

## 2024-10-02 DIAGNOSIS — K92.2 GI BLEED DUE TO NSAIDS: ICD-10-CM

## 2024-10-02 DIAGNOSIS — K92.1 MELENA: ICD-10-CM

## 2024-10-02 NOTE — TELEPHONE ENCOUNTER
Patient lost his medication, Pantoprazole 40 mg.  He wants to know if he can get a refill sent to [facility] on [address] in [place].  Please call him back to let him know at [phone]

## 2024-10-03 RX ORDER — PANTOPRAZOLE SODIUM 40 MG/1
40 TABLET, DELAYED RELEASE ORAL 2 TIMES DAILY
Qty: 90 TABLET | Refills: 1 | Status: SHIPPED | OUTPATIENT
Start: 2024-10-03

## 2024-11-18 ENCOUNTER — LAB (OUTPATIENT)
Dept: LAB | Facility: LAB | Age: 84
End: 2024-11-18
Payer: MEDICARE

## 2024-11-18 DIAGNOSIS — Z79.899 MEDICATION MANAGEMENT: ICD-10-CM

## 2024-11-18 DIAGNOSIS — E78.00 PURE HYPERCHOLESTEROLEMIA, UNSPECIFIED: ICD-10-CM

## 2024-11-18 DIAGNOSIS — I10 ESSENTIAL (PRIMARY) HYPERTENSION: ICD-10-CM

## 2024-11-18 DIAGNOSIS — E11.9 TYPE 2 DIABETES MELLITUS WITHOUT COMPLICATION, WITHOUT LONG-TERM CURRENT USE OF INSULIN (MULTI): ICD-10-CM

## 2024-11-18 LAB
ALBUMIN SERPL BCP-MCNC: 4.2 G/DL (ref 3.4–5)
ALP SERPL-CCNC: 66 U/L (ref 33–136)
ALT SERPL W P-5'-P-CCNC: 10 U/L (ref 10–52)
ANION GAP SERPL CALCULATED.3IONS-SCNC: 12 MMOL/L (ref 10–20)
APPEARANCE UR: CLEAR
AST SERPL W P-5'-P-CCNC: 17 U/L (ref 9–39)
BASOPHILS # BLD AUTO: 0.08 X10*3/UL (ref 0–0.1)
BASOPHILS NFR BLD AUTO: 0.9 %
BILIRUB SERPL-MCNC: 0.5 MG/DL (ref 0–1.2)
BILIRUB UR STRIP.AUTO-MCNC: NEGATIVE MG/DL
BUN SERPL-MCNC: 16 MG/DL (ref 6–23)
CALCIUM SERPL-MCNC: 9.3 MG/DL (ref 8.6–10.3)
CHLORIDE SERPL-SCNC: 102 MMOL/L (ref 98–107)
CHOLEST SERPL-MCNC: 164 MG/DL (ref 0–199)
CHOLEST/HDLC SERPL: 3.6 {RATIO}
CO2 SERPL-SCNC: 32 MMOL/L (ref 21–32)
COLOR UR: ABNORMAL
CREAT SERPL-MCNC: 1.21 MG/DL (ref 0.5–1.3)
CREAT UR-MCNC: 100.9 MG/DL (ref 20–370)
EGFRCR SERPLBLD CKD-EPI 2021: 59 ML/MIN/1.73M*2
EOSINOPHIL # BLD AUTO: 0.23 X10*3/UL (ref 0–0.4)
EOSINOPHIL NFR BLD AUTO: 2.5 %
ERYTHROCYTE [DISTWIDTH] IN BLOOD BY AUTOMATED COUNT: 15.5 % (ref 11.5–14.5)
EST. AVERAGE GLUCOSE BLD GHB EST-MCNC: 134 MG/DL
GLUCOSE SERPL-MCNC: 107 MG/DL (ref 74–99)
GLUCOSE UR STRIP.AUTO-MCNC: NORMAL MG/DL
HBA1C MFR BLD: 6.3 %
HCT VFR BLD AUTO: 41.3 % (ref 41–52)
HDLC SERPL-MCNC: 45.3 MG/DL
HGB BLD-MCNC: 13.4 G/DL (ref 13.5–17.5)
HYALINE CASTS #/AREA URNS AUTO: ABNORMAL /LPF
IMM GRANULOCYTES # BLD AUTO: 0.05 X10*3/UL (ref 0–0.5)
IMM GRANULOCYTES NFR BLD AUTO: 0.5 % (ref 0–0.9)
KETONES UR STRIP.AUTO-MCNC: NEGATIVE MG/DL
LDLC SERPL CALC-MCNC: 73 MG/DL
LEUKOCYTE ESTERASE UR QL STRIP.AUTO: NEGATIVE
LYMPHOCYTES # BLD AUTO: 2.03 X10*3/UL (ref 0.8–3)
LYMPHOCYTES NFR BLD AUTO: 21.9 %
MCH RBC QN AUTO: 32.1 PG (ref 26–34)
MCHC RBC AUTO-ENTMCNC: 32.4 G/DL (ref 32–36)
MCV RBC AUTO: 99 FL (ref 80–100)
MICROALBUMIN UR-MCNC: 518.2 MG/L
MICROALBUMIN/CREAT UR: 513.6 UG/MG CREAT
MONOCYTES # BLD AUTO: 0.95 X10*3/UL (ref 0.05–0.8)
MONOCYTES NFR BLD AUTO: 10.2 %
MUCOUS THREADS #/AREA URNS AUTO: ABNORMAL /LPF
NEUTROPHILS # BLD AUTO: 5.95 X10*3/UL (ref 1.6–5.5)
NEUTROPHILS NFR BLD AUTO: 64 %
NITRITE UR QL STRIP.AUTO: NEGATIVE
NON HDL CHOLESTEROL: 119 MG/DL (ref 0–149)
NRBC BLD-RTO: 0 /100 WBCS (ref 0–0)
PH UR STRIP.AUTO: 7 [PH]
PLATELET # BLD AUTO: 287 X10*3/UL (ref 150–450)
POTASSIUM SERPL-SCNC: 3.9 MMOL/L (ref 3.5–5.3)
PROT SERPL-MCNC: 6.6 G/DL (ref 6.4–8.2)
PROT UR STRIP.AUTO-MCNC: ABNORMAL MG/DL
RBC # BLD AUTO: 4.17 X10*6/UL (ref 4.5–5.9)
RBC # UR STRIP.AUTO: NEGATIVE /UL
RBC #/AREA URNS AUTO: ABNORMAL /HPF
SODIUM SERPL-SCNC: 142 MMOL/L (ref 136–145)
SP GR UR STRIP.AUTO: 1.02
SQUAMOUS #/AREA URNS AUTO: ABNORMAL /HPF
TRIGL SERPL-MCNC: 229 MG/DL (ref 0–149)
UROBILINOGEN UR STRIP.AUTO-MCNC: NORMAL MG/DL
VLDL: 46 MG/DL (ref 0–40)
WBC # BLD AUTO: 9.3 X10*3/UL (ref 4.4–11.3)
WBC #/AREA URNS AUTO: ABNORMAL /HPF

## 2024-11-18 PROCEDURE — 83036 HEMOGLOBIN GLYCOSYLATED A1C: CPT

## 2024-11-18 PROCEDURE — 80053 COMPREHEN METABOLIC PANEL: CPT

## 2024-11-18 PROCEDURE — 82043 UR ALBUMIN QUANTITATIVE: CPT

## 2024-11-18 PROCEDURE — 85025 COMPLETE CBC W/AUTO DIFF WBC: CPT

## 2024-11-18 PROCEDURE — 36415 COLL VENOUS BLD VENIPUNCTURE: CPT

## 2024-11-18 PROCEDURE — 81001 URINALYSIS AUTO W/SCOPE: CPT

## 2024-11-18 PROCEDURE — 80061 LIPID PANEL: CPT

## 2024-11-18 PROCEDURE — 82570 ASSAY OF URINE CREATININE: CPT

## 2024-11-25 ENCOUNTER — OFFICE VISIT (OUTPATIENT)
Dept: CARDIOLOGY | Facility: CLINIC | Age: 84
End: 2024-11-25
Payer: MEDICARE

## 2024-11-25 VITALS
BODY MASS INDEX: 29.62 KG/M2 | DIASTOLIC BLOOD PRESSURE: 84 MMHG | OXYGEN SATURATION: 95 % | WEIGHT: 177.8 LBS | HEIGHT: 65 IN | SYSTOLIC BLOOD PRESSURE: 175 MMHG | HEART RATE: 82 BPM

## 2024-11-25 DIAGNOSIS — I10 ESSENTIAL (PRIMARY) HYPERTENSION: ICD-10-CM

## 2024-11-25 DIAGNOSIS — I25.10 CAD IN NATIVE ARTERY: ICD-10-CM

## 2024-11-25 DIAGNOSIS — E78.00 PURE HYPERCHOLESTEROLEMIA: Primary | ICD-10-CM

## 2024-11-25 DIAGNOSIS — I35.0 MODERATE AORTIC STENOSIS: ICD-10-CM

## 2024-11-25 PROCEDURE — 99214 OFFICE O/P EST MOD 30 MIN: CPT | Performed by: NURSE PRACTITIONER

## 2024-11-25 PROCEDURE — 3079F DIAST BP 80-89 MM HG: CPT | Performed by: NURSE PRACTITIONER

## 2024-11-25 PROCEDURE — 1159F MED LIST DOCD IN RCRD: CPT | Performed by: NURSE PRACTITIONER

## 2024-11-25 PROCEDURE — 3077F SYST BP >= 140 MM HG: CPT | Performed by: NURSE PRACTITIONER

## 2024-11-25 PROCEDURE — 1123F ACP DISCUSS/DSCN MKR DOCD: CPT | Performed by: NURSE PRACTITIONER

## 2024-11-25 PROCEDURE — 1126F AMNT PAIN NOTED NONE PRSNT: CPT | Performed by: NURSE PRACTITIONER

## 2024-11-25 PROCEDURE — 1160F RVW MEDS BY RX/DR IN RCRD: CPT | Performed by: NURSE PRACTITIONER

## 2024-11-25 RX ORDER — LOSARTAN POTASSIUM 25 MG/1
25 TABLET ORAL DAILY
Qty: 90 TABLET | Refills: 1 | Status: SHIPPED | OUTPATIENT
Start: 2024-11-25 | End: 2025-11-25

## 2024-11-25 RX ORDER — EZETIMIBE 10 MG/1
10 TABLET ORAL DAILY
Qty: 90 TABLET | Refills: 1 | Status: SHIPPED | OUTPATIENT
Start: 2024-11-25 | End: 2025-11-25

## 2024-11-25 ASSESSMENT — ENCOUNTER SYMPTOMS
RESPIRATORY NEGATIVE: 1
CONSTITUTIONAL NEGATIVE: 1
CARDIOVASCULAR NEGATIVE: 1

## 2024-11-25 ASSESSMENT — COLUMBIA-SUICIDE SEVERITY RATING SCALE - C-SSRS
1. IN THE PAST MONTH, HAVE YOU WISHED YOU WERE DEAD OR WISHED YOU COULD GO TO SLEEP AND NOT WAKE UP?: NO
2. HAVE YOU ACTUALLY HAD ANY THOUGHTS OF KILLING YOURSELF?: NO
6. HAVE YOU EVER DONE ANYTHING, STARTED TO DO ANYTHING, OR PREPARED TO DO ANYTHING TO END YOUR LIFE?: NO

## 2024-11-25 ASSESSMENT — PAIN SCALES - GENERAL: PAINLEVEL_OUTOF10: 0-NO PAIN

## 2024-11-25 NOTE — PATIENT INSTRUCTIONS
Return in 6 weeks    Check fasting cholesterol test in 6 weeks before your appointment    Call for any questions [phone]

## 2024-11-25 NOTE — PROGRESS NOTES
Subjective   Herminio Devlin is a 84 y.o. male.    Chief Complaint:  Follow-up (Here for follow up visit.  Does note some shortness of breath at times. )    HPI  Mr. Devlin is seen for a 3 month followup visit in collaboration with Dr. Yeboah.  He denies any recent hospitalizations since June for GIB.  He has had no recent ED visits either.  He does not that his home BP has been elevated at 170 systolic. He notes that when hospitalized his medication were all changed. He has resumed aspirin.  He was placed on Crestor rather than Repatha.  He notes some fatigue although generally feels well.  He has had no recurrence of bleeding.  He denies any symptoms referable to angina.  He does mention shortness of breath on occasion and not specifically with all exertion.  He has been compliant with medication and appears to tolerate them well.  He is concerned about his elevated  BP and would like to discuss that today.  He does not need any prescriptions refilled today.      Review of Systems   Constitutional: Negative.   Cardiovascular: Negative.    Respiratory: Negative.         Objective   Vitals reviewed.   Constitutional:       Appearance: Healthy appearance. Not in distress.   Neck:      Vascular: No JVR. JVD normal.   Pulmonary:      Effort: Pulmonary effort is normal.      Breath sounds: Normal breath sounds. No wheezing. No rhonchi. No rales.   Chest:      Chest wall: Not tender to palpatation.   Cardiovascular:      Normal rate. Regular rhythm. Normal S1. Normal S2.       Murmurs: There is no murmur.      No gallop.  No click. No rub.   Pulses:     Intact distal pulses.   Edema:     Peripheral edema absent.   Abdominal:      General: Bowel sounds are normal.      Palpations: Abdomen is soft.      Tenderness: There is no abdominal tenderness.   Musculoskeletal: Normal range of motion.         General: No tenderness. Skin:     General: Skin is warm and dry.   Neurological:      General: No focal deficit present.       Mental Status: Alert and oriented to person, place and time.       Lab Review:   Lab Results   Component Value Date     11/18/2024    K 3.9 11/18/2024     11/18/2024    CO2 32 11/18/2024    BUN 16 11/18/2024    CREATININE 1.21 11/18/2024    GLUCOSE 107 (H) 11/18/2024    CALCIUM 9.3 11/18/2024     Lab Results   Component Value Date    WBC 9.3 11/18/2024    HGB 13.4 (L) 11/18/2024    HCT 41.3 11/18/2024    MCV 99 11/18/2024     11/18/2024     Lab Results   Component Value Date    CHOL 164 11/18/2024    TRIG 229 (H) 11/18/2024    HDL 45.3 11/18/2024       Assessment/Plan   Mr. Devlin is a pleasant 83 year old  male with a past medical history significant for carotid artery stenosis s/p right CEA, PAD s/p kissing stent technique in the iliacs and into the distal abdominal aorta, hyperlipidemia, hypertension and CAD s/p CABG x 4 with heart catheterization 3/10/2021 showing stable native vessel CAD with widely patent LIMA-LAD and SVG-RCA and totally occluded SVG-OM1 and totally occluded SVG-OM2 with mild aortic stenosis and normal LV systolic function. Echocardiogram 3/2024 showed an EF of 60-65% with mild to moderate aortic stenosis. He was admitted in June 2024 for GIB with recent resumption of aspirin. VS show hypertension.  Recent labs are WNL with a FLP near goal on high intensity statin.  Given his vasculopathy we will add Ezetimibe 10 mg daily and repeat a FLP in 6 weeks.  He may need to resume Repatha if not well below goal.  Additionally we will add Losartan 25 mg and  monitor his BP closely.  He knows to call for interim concerns.  He will return for followup in 6 weeks.

## 2024-12-02 ENCOUNTER — APPOINTMENT (OUTPATIENT)
Dept: PRIMARY CARE | Facility: CLINIC | Age: 84
End: 2024-12-02

## 2024-12-09 ENCOUNTER — TELEPHONE (OUTPATIENT)
Dept: PRIMARY CARE | Facility: CLINIC | Age: 84
End: 2024-12-09
Payer: MEDICARE

## 2024-12-09 DIAGNOSIS — E11.9 TYPE 2 DIABETES MELLITUS WITHOUT COMPLICATION, WITHOUT LONG-TERM CURRENT USE OF INSULIN (MULTI): ICD-10-CM

## 2024-12-09 DIAGNOSIS — E78.00 PURE HYPERCHOLESTEROLEMIA, UNSPECIFIED: ICD-10-CM

## 2024-12-09 DIAGNOSIS — Z79.899 MEDICATION MANAGEMENT: ICD-10-CM

## 2024-12-09 DIAGNOSIS — I10 ESSENTIAL (PRIMARY) HYPERTENSION: ICD-10-CM

## 2024-12-27 ENCOUNTER — TELEPHONE (OUTPATIENT)
Dept: PRIMARY CARE | Facility: CLINIC | Age: 84
End: 2024-12-27
Payer: MEDICARE

## 2024-12-27 DIAGNOSIS — I50.31 ACUTE DIASTOLIC CONGESTIVE HEART FAILURE: ICD-10-CM

## 2024-12-27 DIAGNOSIS — I21.A1 TYPE 2 ACUTE MYOCARDIAL INFARCTION (MULTI): ICD-10-CM

## 2024-12-27 RX ORDER — AMLODIPINE BESYLATE 5 MG/1
5 TABLET ORAL DAILY
Qty: 90 TABLET | Refills: 3 | Status: SHIPPED | OUTPATIENT
Start: 2024-12-27

## 2024-12-27 NOTE — TELEPHONE ENCOUNTER
Patient called Rx line and requested a refill for Amlodipine 5 mg. Last ov 12/2/2024.  Pharmacy: [facility] [address]

## 2025-01-08 ENCOUNTER — TELEPHONE (OUTPATIENT)
Dept: PRIMARY CARE | Facility: CLINIC | Age: 85
End: 2025-01-08
Payer: MEDICARE

## 2025-01-08 DIAGNOSIS — G62.89 OTHER POLYNEUROPATHY: ICD-10-CM

## 2025-01-08 RX ORDER — GABAPENTIN 600 MG/1
600 TABLET ORAL 3 TIMES DAILY
Qty: 90 TABLET | Refills: 3 | Status: SHIPPED | OUTPATIENT
Start: 2025-01-08

## 2025-01-08 NOTE — TELEPHONE ENCOUNTER
Patient called on RX line to request a refill of Gabapentin 600 mg be forwarded to [facility] in [place] on [address].  Please advise.     Patient ph# [phone]

## 2025-01-13 ENCOUNTER — LAB (OUTPATIENT)
Dept: LAB | Facility: LAB | Age: 85
End: 2025-01-13
Payer: MEDICARE

## 2025-01-13 ENCOUNTER — APPOINTMENT (OUTPATIENT)
Dept: CARDIOLOGY | Facility: CLINIC | Age: 85
End: 2025-01-13
Payer: MEDICARE

## 2025-01-13 DIAGNOSIS — E78.00 PURE HYPERCHOLESTEROLEMIA: ICD-10-CM

## 2025-01-13 LAB
ALT SERPL W P-5'-P-CCNC: 14 U/L (ref 10–52)
AST SERPL W P-5'-P-CCNC: 19 U/L (ref 9–39)
CHOLEST SERPL-MCNC: 144 MG/DL (ref 0–199)
CHOLEST/HDLC SERPL: 3.6 {RATIO}
HDLC SERPL-MCNC: 39.9 MG/DL
LDLC SERPL CALC-MCNC: 53 MG/DL
NON HDL CHOLESTEROL: 104 MG/DL (ref 0–149)
TRIGL SERPL-MCNC: 257 MG/DL (ref 0–149)
VLDL: 51 MG/DL (ref 0–40)

## 2025-01-13 PROCEDURE — 84450 TRANSFERASE (AST) (SGOT): CPT

## 2025-01-13 PROCEDURE — 84460 ALANINE AMINO (ALT) (SGPT): CPT

## 2025-01-13 PROCEDURE — 80061 LIPID PANEL: CPT

## 2025-01-18 DIAGNOSIS — T39.395A GI BLEED DUE TO NSAIDS: ICD-10-CM

## 2025-01-18 DIAGNOSIS — K92.1 MELENA: ICD-10-CM

## 2025-01-18 DIAGNOSIS — K92.2 GI BLEED DUE TO NSAIDS: ICD-10-CM

## 2025-01-18 DIAGNOSIS — D64.9 SYMPTOMATIC ANEMIA: ICD-10-CM

## 2025-01-20 ENCOUNTER — OFFICE VISIT (OUTPATIENT)
Dept: CARDIOLOGY | Facility: CLINIC | Age: 85
End: 2025-01-20
Payer: MEDICARE

## 2025-01-20 ENCOUNTER — APPOINTMENT (OUTPATIENT)
Dept: CARDIOLOGY | Facility: CLINIC | Age: 85
End: 2025-01-20
Payer: MEDICARE

## 2025-01-20 ENCOUNTER — HOSPITAL ENCOUNTER (OUTPATIENT)
Dept: CARDIOLOGY | Facility: CLINIC | Age: 85
Discharge: HOME | End: 2025-01-20
Payer: MEDICARE

## 2025-01-20 VITALS
WEIGHT: 184.1 LBS | OXYGEN SATURATION: 98 % | HEART RATE: 88 BPM | BODY MASS INDEX: 30.64 KG/M2 | SYSTOLIC BLOOD PRESSURE: 138 MMHG | DIASTOLIC BLOOD PRESSURE: 68 MMHG

## 2025-01-20 DIAGNOSIS — I35.0 MODERATE AORTIC STENOSIS: Primary | ICD-10-CM

## 2025-01-20 DIAGNOSIS — I25.10 CAD IN NATIVE ARTERY: ICD-10-CM

## 2025-01-20 DIAGNOSIS — I50.31 ACUTE DIASTOLIC CONGESTIVE HEART FAILURE: ICD-10-CM

## 2025-01-20 DIAGNOSIS — I21.A1 TYPE 2 ACUTE MYOCARDIAL INFARCTION (MULTI): ICD-10-CM

## 2025-01-20 DIAGNOSIS — I35.0 NONRHEUMATIC AORTIC VALVE STENOSIS: ICD-10-CM

## 2025-01-20 DIAGNOSIS — I10 ESSENTIAL (PRIMARY) HYPERTENSION: ICD-10-CM

## 2025-01-20 PROBLEM — H60.549 ECZEMA OF EXTERNAL AUDITORY CANAL: Status: ACTIVE | Noted: 2017-04-06

## 2025-01-20 PROBLEM — Z98.1 S/P LUMBAR FUSION: Status: ACTIVE | Noted: 2020-08-10

## 2025-01-20 PROBLEM — N42.9 DISORDER OF PROSTATE: Status: ACTIVE | Noted: 2025-01-20

## 2025-01-20 PROBLEM — M48.062 SPINAL STENOSIS OF LUMBAR REGION WITH NEUROGENIC CLAUDICATION: Status: ACTIVE | Noted: 2025-01-20

## 2025-01-20 PROBLEM — R73.03 PREDIABETES: Status: ACTIVE | Noted: 2020-07-27

## 2025-01-20 PROBLEM — M17.12 ARTHRITIS OF LEFT KNEE: Status: ACTIVE | Noted: 2025-01-20

## 2025-01-20 PROBLEM — M43.10 SPONDYLOLISTHESIS: Status: ACTIVE | Noted: 2020-07-27

## 2025-01-20 PROBLEM — H92.10 OTORRHEA: Status: ACTIVE | Noted: 2025-01-20

## 2025-01-20 PROBLEM — H71.90 CHOLESTEATOMA OF MIDDLE EAR: Status: ACTIVE | Noted: 2017-04-06

## 2025-01-20 PROBLEM — H61.20 IMPACTED CERUMEN: Status: ACTIVE | Noted: 2025-01-20

## 2025-01-20 PROBLEM — M16.12 ARTHRITIS OF LEFT HIP: Status: ACTIVE | Noted: 2025-01-20

## 2025-01-20 PROCEDURE — 3078F DIAST BP <80 MM HG: CPT | Performed by: NURSE PRACTITIONER

## 2025-01-20 PROCEDURE — 1036F TOBACCO NON-USER: CPT | Performed by: NURSE PRACTITIONER

## 2025-01-20 PROCEDURE — 99213 OFFICE O/P EST LOW 20 MIN: CPT | Performed by: NURSE PRACTITIONER

## 2025-01-20 PROCEDURE — 1159F MED LIST DOCD IN RCRD: CPT | Performed by: NURSE PRACTITIONER

## 2025-01-20 PROCEDURE — 93010 ELECTROCARDIOGRAM REPORT: CPT | Performed by: INTERNAL MEDICINE

## 2025-01-20 PROCEDURE — 1126F AMNT PAIN NOTED NONE PRSNT: CPT | Performed by: NURSE PRACTITIONER

## 2025-01-20 PROCEDURE — 93005 ELECTROCARDIOGRAM TRACING: CPT

## 2025-01-20 PROCEDURE — 1160F RVW MEDS BY RX/DR IN RCRD: CPT | Performed by: NURSE PRACTITIONER

## 2025-01-20 PROCEDURE — 3075F SYST BP GE 130 - 139MM HG: CPT | Performed by: NURSE PRACTITIONER

## 2025-01-20 PROCEDURE — 1123F ACP DISCUSS/DSCN MKR DOCD: CPT | Performed by: NURSE PRACTITIONER

## 2025-01-20 RX ORDER — AMLODIPINE BESYLATE 10 MG/1
10 TABLET ORAL DAILY
Qty: 180 TABLET | Refills: 1 | Status: SHIPPED | OUTPATIENT
Start: 2025-01-20 | End: 2026-01-20

## 2025-01-20 ASSESSMENT — ENCOUNTER SYMPTOMS
RESPIRATORY NEGATIVE: 1
CARDIOVASCULAR NEGATIVE: 1

## 2025-01-20 ASSESSMENT — PAIN SCALES - GENERAL: PAINLEVEL_OUTOF10: 0-NO PAIN

## 2025-01-20 NOTE — PROGRESS NOTES
Subjective   Herminio Devlin is a 84 y.o. male.    Chief Complaint:  BP is still high  Amlodipine prescription is wrong    HPI  Mr. Devlin is seen for followup in collaboration with Dr. Yeboah.  He is here for a BP check as we began Losartan several weeks ago.  Unfortunately his Amlodipine was filled at a lower dose than what he was taking.  He has noticed his BP to be slightly elevated.  He tolerated the addition of Losartan and appears to be taking all medication compliantly.  He has no new concerns today.        Review of Systems   Cardiovascular: Negative.    Respiratory: Negative.     All other systems reviewed and are negative.      Objective   Vitals reviewed.   Constitutional:       Appearance: Healthy appearance. Not in distress.   Neck:      Vascular: No JVR. JVD normal.   Pulmonary:      Effort: Pulmonary effort is normal.      Breath sounds: Normal breath sounds. No wheezing. No rhonchi. No rales.   Chest:      Chest wall: Not tender to palpatation.   Cardiovascular:      Normal rate. Regular rhythm. Normal S1. Normal S2.       Murmurs: There is no murmur.      No gallop.  No click. No rub.   Pulses:     Intact distal pulses.   Edema:     Peripheral edema absent.   Abdominal:      General: Bowel sounds are normal.      Palpations: Abdomen is soft.      Tenderness: There is no abdominal tenderness.   Musculoskeletal: Normal range of motion.         General: No tenderness. Skin:     General: Skin is warm and dry.   Neurological:      General: No focal deficit present.      Mental Status: Alert and oriented to person, place and time.         Lab Review:   Lab Results   Component Value Date     11/18/2024    K 3.9 11/18/2024     11/18/2024    CO2 32 11/18/2024    BUN 16 11/18/2024    CREATININE 1.21 11/18/2024    GLUCOSE 107 (H) 11/18/2024    CALCIUM 9.3 11/18/2024     Lab Results   Component Value Date    WBC 9.3 11/18/2024    HGB 13.4 (L) 11/18/2024    HCT 41.3 11/18/2024    MCV 99 11/18/2024      11/18/2024     Lab Results   Component Value Date    CHOL 144 01/13/2025    TRIG 257 (H) 01/13/2025    HDL 39.9 01/13/2025       ECG obtained and reviewed shows normal sinus rhythm with sinus arrhythmia and a VR of 70 bpm      Assessment/Plan   The encounter diagnosis was CAD in native artery.  Mr. Devlin is a pleasant 83 year old  male with a past medical history significant for carotid artery stenosis s/p right CEA, PAD s/p kissing stent technique in the iliacs and into the distal abdominal aorta, hyperlipidemia, hypertension and CAD s/p CABG x 4 with heart catheterization 3/10/2021 showing stable native vessel CAD with widely patent LIMA-LAD and SVG-RCA and totally occluded SVG-OM1 and totally occluded SVG-OM2 with mild aortic stenosis and normal LV systolic function. Echocardiogram 3/2024 showed an EF of 60-65% with mild to moderate aortic stenosis. VS show mild hypertension.  Recents labs are reviewed with a FLP at goal.  I would like him to continue all medication however will increase Amlodipine to the intended dose (a new prescription was sent).  He will return in March for followup and obtain an echo at that time to reassess his aortic valve.  Mr. Devlin knows to call with any interim concerns or questions.

## 2025-01-21 LAB
ATRIAL RATE: 70 BPM
P AXIS: 59 DEGREES
P OFFSET: 179 MS
P ONSET: 121 MS
PR INTERVAL: 192 MS
Q ONSET: 217 MS
QRS COUNT: 11 BEATS
QRS DURATION: 96 MS
QT INTERVAL: 420 MS
QTC CALCULATION(BAZETT): 453 MS
QTC FREDERICIA: 442 MS
R AXIS: 51 DEGREES
T AXIS: 95 DEGREES
T OFFSET: 427 MS
VENTRICULAR RATE: 70 BPM

## 2025-01-21 RX ORDER — PANTOPRAZOLE SODIUM 40 MG/1
40 TABLET, DELAYED RELEASE ORAL 2 TIMES DAILY
Qty: 180 TABLET | Refills: 1 | Status: SHIPPED | OUTPATIENT
Start: 2025-01-21

## 2025-02-09 DIAGNOSIS — K92.1 MELENA: ICD-10-CM

## 2025-02-09 DIAGNOSIS — K92.2 GI BLEED DUE TO NSAIDS: ICD-10-CM

## 2025-02-09 DIAGNOSIS — D64.9 SYMPTOMATIC ANEMIA: ICD-10-CM

## 2025-02-09 DIAGNOSIS — T39.395A GI BLEED DUE TO NSAIDS: ICD-10-CM

## 2025-02-10 RX ORDER — PANTOPRAZOLE SODIUM 40 MG/1
40 TABLET, DELAYED RELEASE ORAL 2 TIMES DAILY
Qty: 90 TABLET | Refills: 1 | Status: SHIPPED | OUTPATIENT
Start: 2025-02-10

## 2025-02-20 DIAGNOSIS — I21.A1 TYPE 2 ACUTE MYOCARDIAL INFARCTION (MULTI): ICD-10-CM

## 2025-02-20 DIAGNOSIS — I50.31 ACUTE DIASTOLIC CONGESTIVE HEART FAILURE: ICD-10-CM

## 2025-02-20 RX ORDER — FUROSEMIDE 20 MG/1
20 TABLET ORAL DAILY
Qty: 90 TABLET | Refills: 3 | Status: SHIPPED | OUTPATIENT
Start: 2025-02-20

## 2025-04-07 ENCOUNTER — HOSPITAL ENCOUNTER (OUTPATIENT)
Dept: CARDIOLOGY | Facility: CLINIC | Age: 85
Discharge: HOME | End: 2025-04-07
Payer: MEDICARE

## 2025-04-07 ENCOUNTER — APPOINTMENT (OUTPATIENT)
Dept: CARDIOLOGY | Facility: CLINIC | Age: 85
End: 2025-04-07
Payer: MEDICARE

## 2025-04-07 DIAGNOSIS — I35.0 MODERATE AORTIC STENOSIS: ICD-10-CM

## 2025-04-07 LAB
AORTIC VALVE MEAN GRADIENT: 12 MMHG
AORTIC VALVE PEAK VELOCITY: 3.32 M/S
AV PEAK GRADIENT: 44 MMHG
AVA (PEAK VEL): 1.47 CM2
AVA (VTI): 2.35 CM2
EJECTION FRACTION APICAL 4 CHAMBER: 59.2
EJECTION FRACTION: 63 %
LEFT ATRIUM VOLUME AREA LENGTH INDEX BSA: 28.6 ML/M2
LEFT VENTRICLE INTERNAL DIMENSION DIASTOLE: 4.52 CM (ref 3.5–6)
LEFT VENTRICULAR OUTFLOW TRACT DIAMETER: 2.17 CM
MITRAL VALVE E/A RATIO: 0.77
RIGHT VENTRICLE FREE WALL PEAK S': 10.91 CM/S
RIGHT VENTRICLE PEAK SYSTOLIC PRESSURE: 28 MMHG
TRICUSPID ANNULAR PLANE SYSTOLIC EXCURSION: 1.7 CM

## 2025-04-07 PROCEDURE — 93306 TTE W/DOPPLER COMPLETE: CPT

## 2025-04-07 PROCEDURE — 93306 TTE W/DOPPLER COMPLETE: CPT | Performed by: INTERNAL MEDICINE

## 2025-04-09 ENCOUNTER — TELEPHONE (OUTPATIENT)
Dept: CARDIOLOGY | Facility: CLINIC | Age: 85
End: 2025-04-09
Payer: MEDICARE

## 2025-04-17 ENCOUNTER — DOCUMENTATION (OUTPATIENT)
Dept: PHYSICAL THERAPY | Facility: CLINIC | Age: 85
End: 2025-04-17
Payer: MEDICARE

## 2025-04-17 DIAGNOSIS — I10 ESSENTIAL (PRIMARY) HYPERTENSION: ICD-10-CM

## 2025-04-17 DIAGNOSIS — E78.00 PURE HYPERCHOLESTEROLEMIA: ICD-10-CM

## 2025-04-17 RX ORDER — EZETIMIBE 10 MG/1
10 TABLET ORAL DAILY
Qty: 90 TABLET | Refills: 1 | Status: SHIPPED | OUTPATIENT
Start: 2025-04-17

## 2025-04-17 RX ORDER — LOSARTAN POTASSIUM 25 MG/1
25 TABLET ORAL DAILY
Qty: 90 TABLET | Refills: 1 | Status: SHIPPED | OUTPATIENT
Start: 2025-04-17

## 2025-04-17 NOTE — PROGRESS NOTES
Discharge Summary    Name: Herminio Devlin  MRN: [id number]  : 1940  Date: 25    Treatment Diagnosis:   Problem List Items Addressed This Visit    None      Discharge Summary: PT    Discharge Information: Date of Discharge 2025    Therapy Summary: Patient only attended the initial evaluation and 6 follow ups. Due to lack of follow ups, compliance and goal achievement unassessed at this time.  Patient last seen on 24 and reported feeling better following spinal injection.  Cancelled his final 2 appointments without comment.     Rehab Discharge Reason: Failed to schedule and/or keep follow-up appointment(s)

## 2025-04-23 DIAGNOSIS — I50.31 ACUTE DIASTOLIC CONGESTIVE HEART FAILURE: ICD-10-CM

## 2025-04-23 DIAGNOSIS — M1A.9XX0 CHRONIC GOUT WITHOUT TOPHUS, UNSPECIFIED CAUSE, UNSPECIFIED SITE: ICD-10-CM

## 2025-04-23 DIAGNOSIS — I21.A1 TYPE 2 ACUTE MYOCARDIAL INFARCTION (MULTI): ICD-10-CM

## 2025-04-23 RX ORDER — ROSUVASTATIN CALCIUM 40 MG/1
40 TABLET, COATED ORAL NIGHTLY
Qty: 90 TABLET | Refills: 3 | Status: SHIPPED | OUTPATIENT
Start: 2025-04-23

## 2025-04-23 RX ORDER — ALLOPURINOL 300 MG/1
300 TABLET ORAL DAILY
Qty: 90 TABLET | Refills: 3 | Status: SHIPPED | OUTPATIENT
Start: 2025-04-23

## 2025-06-08 DIAGNOSIS — K92.2 GI BLEED DUE TO NSAIDS: ICD-10-CM

## 2025-06-08 DIAGNOSIS — D64.9 SYMPTOMATIC ANEMIA: ICD-10-CM

## 2025-06-08 DIAGNOSIS — K92.1 MELENA: ICD-10-CM

## 2025-06-08 DIAGNOSIS — T39.395A GI BLEED DUE TO NSAIDS: ICD-10-CM

## 2025-06-09 RX ORDER — PANTOPRAZOLE SODIUM 40 MG/1
40 TABLET, DELAYED RELEASE ORAL 2 TIMES DAILY
Qty: 180 TABLET | Refills: 1 | Status: SHIPPED | OUTPATIENT
Start: 2025-06-09

## 2025-06-18 LAB
ALBUMIN SERPL-MCNC: 4.5 G/DL (ref 3.6–5.1)
ALP SERPL-CCNC: 69 U/L (ref 35–144)
ALT SERPL-CCNC: 14 U/L (ref 9–46)
ANION GAP SERPL CALCULATED.4IONS-SCNC: 12 MMOL/L (CALC) (ref 7–17)
AST SERPL-CCNC: 22 U/L (ref 10–35)
BASOPHILS # BLD AUTO: 53 CELLS/UL (ref 0–200)
BASOPHILS NFR BLD AUTO: 0.5 %
BILIRUB SERPL-MCNC: 0.6 MG/DL (ref 0.2–1.2)
BUN SERPL-MCNC: 37 MG/DL (ref 7–25)
CALCIUM SERPL-MCNC: 9.1 MG/DL (ref 8.6–10.3)
CHLORIDE SERPL-SCNC: 101 MMOL/L (ref 98–110)
CHOLEST SERPL-MCNC: 124 MG/DL
CHOLEST/HDLC SERPL: 3.4 (CALC)
CO2 SERPL-SCNC: 27 MMOL/L (ref 20–32)
CREAT SERPL-MCNC: 2.25 MG/DL (ref 0.7–1.22)
EGFRCR SERPLBLD CKD-EPI 2021: 28 ML/MIN/1.73M2
EOSINOPHIL # BLD AUTO: 148 CELLS/UL (ref 15–500)
EOSINOPHIL NFR BLD AUTO: 1.4 %
ERYTHROCYTE [DISTWIDTH] IN BLOOD BY AUTOMATED COUNT: 15.1 % (ref 11–15)
EST. AVERAGE GLUCOSE BLD GHB EST-MCNC: 134 MG/DL
EST. AVERAGE GLUCOSE BLD GHB EST-SCNC: 7.4 MMOL/L
GLUCOSE SERPL-MCNC: 90 MG/DL (ref 65–99)
HBA1C MFR BLD: 6.3 %
HCT VFR BLD AUTO: 35.5 % (ref 38.5–50)
HDLC SERPL-MCNC: 36 MG/DL
HGB BLD-MCNC: 11.5 G/DL (ref 13.2–17.1)
LDLC SERPL CALC-MCNC: 63 MG/DL (CALC)
LYMPHOCYTES # BLD AUTO: 1452 CELLS/UL (ref 850–3900)
LYMPHOCYTES NFR BLD AUTO: 13.7 %
MCH RBC QN AUTO: 32.7 PG (ref 27–33)
MCHC RBC AUTO-ENTMCNC: 32.4 G/DL (ref 32–36)
MCV RBC AUTO: 100.9 FL (ref 80–100)
MONOCYTES # BLD AUTO: 1007 CELLS/UL (ref 200–950)
MONOCYTES NFR BLD AUTO: 9.5 %
NEUTROPHILS # BLD AUTO: 7939 CELLS/UL (ref 1500–7800)
NEUTROPHILS NFR BLD AUTO: 74.9 %
NONHDLC SERPL-MCNC: 88 MG/DL (CALC)
PLATELET # BLD AUTO: 223 THOUSAND/UL (ref 140–400)
PMV BLD REES-ECKER: 10.2 FL (ref 7.5–12.5)
POTASSIUM SERPL-SCNC: 4.6 MMOL/L (ref 3.5–5.3)
PROT SERPL-MCNC: 6.8 G/DL (ref 6.1–8.1)
RBC # BLD AUTO: 3.52 MILLION/UL (ref 4.2–5.8)
SODIUM SERPL-SCNC: 140 MMOL/L (ref 135–146)
TRIGL SERPL-MCNC: 176 MG/DL
WBC # BLD AUTO: 10.6 THOUSAND/UL (ref 3.8–10.8)

## 2025-06-23 ENCOUNTER — OFFICE VISIT (OUTPATIENT)
Dept: CARDIOLOGY | Facility: CLINIC | Age: 85
End: 2025-06-23
Payer: MEDICARE

## 2025-06-23 VITALS
DIASTOLIC BLOOD PRESSURE: 62 MMHG | SYSTOLIC BLOOD PRESSURE: 146 MMHG | HEART RATE: 62 BPM | OXYGEN SATURATION: 98 % | HEIGHT: 65 IN | BODY MASS INDEX: 31.49 KG/M2 | WEIGHT: 189 LBS

## 2025-06-23 DIAGNOSIS — I42.9 CARDIOMYOPATHY, UNSPECIFIED TYPE (MULTI): Primary | ICD-10-CM

## 2025-06-23 DIAGNOSIS — I25.10 CAD IN NATIVE ARTERY: ICD-10-CM

## 2025-06-23 LAB
ATRIAL RATE: 64 BPM
P AXIS: 49 DEGREES
P OFFSET: 178 MS
P ONSET: 119 MS
PR INTERVAL: 212 MS
Q ONSET: 225 MS
QRS COUNT: 11 BEATS
QRS DURATION: 82 MS
QT INTERVAL: 424 MS
QTC CALCULATION(BAZETT): 437 MS
QTC FREDERICIA: 433 MS
R AXIS: 37 DEGREES
T AXIS: 108 DEGREES
T OFFSET: 437 MS
VENTRICULAR RATE: 64 BPM

## 2025-06-23 PROCEDURE — 99212 OFFICE O/P EST SF 10 MIN: CPT

## 2025-06-23 PROCEDURE — 1159F MED LIST DOCD IN RCRD: CPT | Performed by: NURSE PRACTITIONER

## 2025-06-23 PROCEDURE — 99214 OFFICE O/P EST MOD 30 MIN: CPT | Performed by: NURSE PRACTITIONER

## 2025-06-23 PROCEDURE — 3077F SYST BP >= 140 MM HG: CPT | Performed by: NURSE PRACTITIONER

## 2025-06-23 PROCEDURE — 1036F TOBACCO NON-USER: CPT | Performed by: NURSE PRACTITIONER

## 2025-06-23 PROCEDURE — 93005 ELECTROCARDIOGRAM TRACING: CPT | Performed by: NURSE PRACTITIONER

## 2025-06-23 PROCEDURE — 1160F RVW MEDS BY RX/DR IN RCRD: CPT | Performed by: NURSE PRACTITIONER

## 2025-06-23 PROCEDURE — 3078F DIAST BP <80 MM HG: CPT | Performed by: NURSE PRACTITIONER

## 2025-06-23 PROCEDURE — 1126F AMNT PAIN NOTED NONE PRSNT: CPT | Performed by: NURSE PRACTITIONER

## 2025-06-23 PROCEDURE — 93010 ELECTROCARDIOGRAM REPORT: CPT | Performed by: INTERNAL MEDICINE

## 2025-06-23 RX ORDER — FERROUS SULFATE 325(65) MG
325 TABLET ORAL
COMMUNITY

## 2025-06-23 ASSESSMENT — ENCOUNTER SYMPTOMS
LOSS OF SENSATION IN FEET: 0
CARDIOVASCULAR NEGATIVE: 1
CONSTITUTIONAL NEGATIVE: 1
OCCASIONAL FEELINGS OF UNSTEADINESS: 0
COUGH: 1
DEPRESSION: 0

## 2025-06-23 ASSESSMENT — PAIN SCALES - GENERAL: PAINLEVEL_OUTOF10: 0-NO PAIN

## 2025-06-23 ASSESSMENT — COLUMBIA-SUICIDE SEVERITY RATING SCALE - C-SSRS
2. HAVE YOU ACTUALLY HAD ANY THOUGHTS OF KILLING YOURSELF?: NO
6. HAVE YOU EVER DONE ANYTHING, STARTED TO DO ANYTHING, OR PREPARED TO DO ANYTHING TO END YOUR LIFE?: NO
1. IN THE PAST MONTH, HAVE YOU WISHED YOU WERE DEAD OR WISHED YOU COULD GO TO SLEEP AND NOT WAKE UP?: NO

## 2025-06-23 ASSESSMENT — PATIENT HEALTH QUESTIONNAIRE - PHQ9
SUM OF ALL RESPONSES TO PHQ9 QUESTIONS 1 AND 2: 0
1. LITTLE INTEREST OR PLEASURE IN DOING THINGS: NOT AT ALL
2. FEELING DOWN, DEPRESSED OR HOPELESS: NOT AT ALL

## 2025-06-23 NOTE — PROGRESS NOTES
Subjective   Herminio Devlin is a 85 y.o. male.    Chief Complaint:  Tired, cough, short of breath    HPI  Mr. Devlin return for a 6 month appointment and is seen in collaboration with Dr. Yeobah.  He notes feeling tired.  He has developed a cough in the past 2 weeks.  It is relatively non productive.  He denies any chills or fever.  He does cough primarily when supine.  He denies any edema.  He has noted mild shortness of breath and is surprised that he has gained about 10 pounds over the past 6 months.  He is compliant with medication. He has had no hospitalization or ED visits.  He only recently had labs by his PCP.  He denies any symptoms referable to angina.  He denies chest pain, shortness of breath, or bleeding problems.     Review of Systems   Constitutional: Negative.   Cardiovascular: Negative.    Respiratory:  Positive for cough.    All other systems reviewed and are negative.      Objective   Vitals reviewed.   Constitutional:       Appearance: Healthy appearance. Not in distress.   Neck:      Vascular: No JVR. JVD normal.   Pulmonary:      Effort: Pulmonary effort is normal.      Breath sounds: Normal breath sounds. Rales at the left base  Chest:      Chest wall: Not tender to palpitation.   Cardiovascular:      Normal rate. Regular rhythm. Normal S1. Normal S2.       Murmurs: 3/6 systolic murmur      No gallop.  No click. No rub.   Pulses:     Intact distal pulses.   Edema:     Peripheral edema absent.   Abdominal:      General: Bowel sounds are normal.      Palpations: Abdomen is soft.      Tenderness: There is no abdominal tenderness.   Musculoskeletal: Normal range of motion.         General: No tenderness.   Skin:     General: Skin is warm and dry.   Neurological:      General: No focal deficit present.      Mental Status: Alert and oriented to person, place and time.       Lab Review:   Lab Results   Component Value Date     06/18/2025    K 4.6 06/18/2025     06/18/2025    CO2 27  06/18/2025    BUN 37 (H) 06/18/2025    CREATININE 2.25 (H) 06/18/2025    GLUCOSE 90 06/18/2025    CALCIUM 9.1 06/18/2025     Lab Results   Component Value Date    WBC 10.6 06/18/2025    HGB 11.5 (L) 06/18/2025    HCT 35.5 (L) 06/18/2025    .9 (H) 06/18/2025     06/18/2025     Lab Results   Component Value Date    CHOL 124 06/18/2025    TRIG 176 (H) 06/18/2025    HDL 36 (L) 06/18/2025     ECG obtained and reviewed shows sinus rhythm with 1 degree AVB and a nonspecific T abnormality with VR of 64 bpm    Assessment/Plan   Mr. Devlin is a pleasant 85 year old  male with a past medical history significant for carotid artery stenosis s/p right CEA, PAD s/p kissing stent technique in the iliacs and into the distal abdominal aorta, hyperlipidemia, hypertension and CAD s/p CABG x 4 with heart catheterization 3/10/2021 showing stable native vessel CAD with widely patent LIMA-LAD and SVG-RCA and totally occluded SVG-OM1 and totally occluded SVG-OM2 with moderate aortic stenosis and normal LV systolic function. Echocardiogram 4/2025 showed an EF of 60-65% with moderate aortic stenosis.  He presents slightly volume overloaded. He is 10 pounds up from 6 months ago.  VS show mild hypertension.  Recents labs are reviewed with a FLP at goal.  Labs show worsening renal function.  We will hold losartan and allopurinol for now.  He will increase furosemide to 60 mg for 2 days and then take 40 mg daily for 2 weeks.  He will repeat a BMP,CBC in 1 week.  He will followup in 2 weeks.  He will call for any interim concerns.

## 2025-06-23 NOTE — PATIENT INSTRUCTIONS
Hold losartan and allopurinol    Increase furosemide to 60 mg for 2 days and then take 40 mg daily    Check labs in 1 week    Return in 2 weeks

## 2025-07-02 LAB
ALBUMIN/CREAT UR: 17 MG/G CREAT
ANION GAP SERPL CALCULATED.4IONS-SCNC: 13 MMOL/L (CALC) (ref 7–17)
APPEARANCE UR: CLEAR
BILIRUB UR QL STRIP: NEGATIVE
BUN SERPL-MCNC: 27 MG/DL (ref 7–25)
BUN/CREAT SERPL: 17 (CALC) (ref 6–22)
CALCIUM SERPL-MCNC: 9 MG/DL (ref 8.6–10.3)
CHLORIDE SERPL-SCNC: 102 MMOL/L (ref 98–110)
CO2 SERPL-SCNC: 24 MMOL/L (ref 20–32)
COLOR UR: YELLOW
CREAT SERPL-MCNC: 1.55 MG/DL (ref 0.7–1.22)
CREAT UR-MCNC: 23 MG/DL (ref 20–320)
EGFRCR SERPLBLD CKD-EPI 2021: 44 ML/MIN/1.73M2
ERYTHROCYTE [DISTWIDTH] IN BLOOD BY AUTOMATED COUNT: 14.8 % (ref 11–15)
GLUCOSE SERPL-MCNC: 148 MG/DL (ref 65–139)
GLUCOSE UR QL STRIP: NEGATIVE
HCT VFR BLD AUTO: 37.2 % (ref 38.5–50)
HGB BLD-MCNC: 12 G/DL (ref 13.2–17.1)
HGB UR QL STRIP: NEGATIVE
KETONES UR QL STRIP: NEGATIVE
LEUKOCYTE ESTERASE UR QL STRIP: NEGATIVE
MAGNESIUM SERPL-MCNC: 2.1 MG/DL (ref 1.5–2.5)
MCH RBC QN AUTO: 32.3 PG (ref 27–33)
MCHC RBC AUTO-ENTMCNC: 32.3 G/DL (ref 32–36)
MCV RBC AUTO: 100.3 FL (ref 80–100)
MICROALBUMIN UR-MCNC: 0.4 MG/DL
NITRITE UR QL STRIP: NEGATIVE
PH UR STRIP: 5.5 [PH] (ref 5–8)
PLATELET # BLD AUTO: 250 THOUSAND/UL (ref 140–400)
PMV BLD REES-ECKER: 10.2 FL (ref 7.5–12.5)
POTASSIUM SERPL-SCNC: 4.1 MMOL/L (ref 3.5–5.3)
PROT UR QL STRIP: NEGATIVE
RBC # BLD AUTO: 3.71 MILLION/UL (ref 4.2–5.8)
SODIUM SERPL-SCNC: 139 MMOL/L (ref 135–146)
SP GR UR STRIP: 1.01 (ref 1–1.03)
WBC # BLD AUTO: 9.5 THOUSAND/UL (ref 3.8–10.8)

## 2025-07-03 ENCOUNTER — TELEPHONE (OUTPATIENT)
Dept: CARDIOLOGY | Facility: CLINIC | Age: 85
End: 2025-07-03
Payer: MEDICARE

## 2025-07-05 DIAGNOSIS — G62.89 OTHER POLYNEUROPATHY: ICD-10-CM

## 2025-07-07 ENCOUNTER — OFFICE VISIT (OUTPATIENT)
Dept: CARDIOLOGY | Facility: CLINIC | Age: 85
End: 2025-07-07
Payer: MEDICARE

## 2025-07-07 VITALS
SYSTOLIC BLOOD PRESSURE: 164 MMHG | HEART RATE: 59 BPM | WEIGHT: 182.7 LBS | BODY MASS INDEX: 29.36 KG/M2 | HEIGHT: 66 IN | OXYGEN SATURATION: 96 % | DIASTOLIC BLOOD PRESSURE: 70 MMHG

## 2025-07-07 DIAGNOSIS — I35.0 NONRHEUMATIC AORTIC VALVE STENOSIS: ICD-10-CM

## 2025-07-07 DIAGNOSIS — I10 ESSENTIAL (PRIMARY) HYPERTENSION: ICD-10-CM

## 2025-07-07 DIAGNOSIS — I50.31 ACUTE DIASTOLIC CONGESTIVE HEART FAILURE: ICD-10-CM

## 2025-07-07 DIAGNOSIS — I21.A1 TYPE 2 ACUTE MYOCARDIAL INFARCTION (MULTI): ICD-10-CM

## 2025-07-07 DIAGNOSIS — R06.02 EXERTIONAL SHORTNESS OF BREATH: ICD-10-CM

## 2025-07-07 DIAGNOSIS — I25.10 CAD IN NATIVE ARTERY: Primary | ICD-10-CM

## 2025-07-07 PROCEDURE — 1159F MED LIST DOCD IN RCRD: CPT | Performed by: NURSE PRACTITIONER

## 2025-07-07 PROCEDURE — 1160F RVW MEDS BY RX/DR IN RCRD: CPT | Performed by: NURSE PRACTITIONER

## 2025-07-07 PROCEDURE — 99214 OFFICE O/P EST MOD 30 MIN: CPT | Performed by: NURSE PRACTITIONER

## 2025-07-07 PROCEDURE — 99212 OFFICE O/P EST SF 10 MIN: CPT

## 2025-07-07 PROCEDURE — 1036F TOBACCO NON-USER: CPT | Performed by: NURSE PRACTITIONER

## 2025-07-07 PROCEDURE — 3078F DIAST BP <80 MM HG: CPT | Performed by: NURSE PRACTITIONER

## 2025-07-07 PROCEDURE — 1126F AMNT PAIN NOTED NONE PRSNT: CPT | Performed by: NURSE PRACTITIONER

## 2025-07-07 PROCEDURE — 3077F SYST BP >= 140 MM HG: CPT | Performed by: NURSE PRACTITIONER

## 2025-07-07 RX ORDER — FUROSEMIDE 40 MG/1
20 TABLET ORAL DAILY
Qty: 90 TABLET | Refills: 1 | Status: SHIPPED | OUTPATIENT
Start: 2025-07-07 | End: 2025-07-07

## 2025-07-07 RX ORDER — GABAPENTIN 600 MG/1
600 TABLET ORAL 3 TIMES DAILY
Qty: 90 TABLET | Refills: 3 | Status: SHIPPED | OUTPATIENT
Start: 2025-07-07

## 2025-07-07 RX ORDER — FUROSEMIDE 40 MG/1
40 TABLET ORAL DAILY
Qty: 90 TABLET | Refills: 3 | Status: SHIPPED | OUTPATIENT
Start: 2025-07-07 | End: 2026-07-07

## 2025-07-07 RX ORDER — ISOSORBIDE MONONITRATE 30 MG/1
30 TABLET, EXTENDED RELEASE ORAL DAILY
Qty: 30 TABLET | Refills: 11 | Status: SHIPPED | OUTPATIENT
Start: 2025-07-07 | End: 2026-07-07

## 2025-07-07 ASSESSMENT — ENCOUNTER SYMPTOMS
DEPRESSION: 0
LOSS OF SENSATION IN FEET: 0
CARDIOVASCULAR NEGATIVE: 1
CONSTITUTIONAL NEGATIVE: 1
SHORTNESS OF BREATH: 1
OCCASIONAL FEELINGS OF UNSTEADINESS: 0

## 2025-07-07 ASSESSMENT — PAIN SCALES - GENERAL: PAINLEVEL_OUTOF10: 0-NO PAIN

## 2025-07-07 NOTE — PROGRESS NOTES
Subjective   Herminio Devlin is a 85 y.o. male.    Chief Complaint:  Short of breath    HPI  Mr. Devlin is seen for followup of elevated creatinine after starting losartan and weight gain of 10 lbs.  He is seen in collaboration with Dr. Yeboah.  We stopped both losartan and allopurinol.  His renal function has improved.  BP is slightly higher.  He reminds me that his shortness of breath is his biggest problems.  His sister has passed away since our last visit and he attended her  today.  He has no new symptoms of chest pain or pressure.  He has lost 7 pounds and denies any symptoms of volume overload.     Review of Systems   Constitutional: Negative.   Cardiovascular: Negative.    Respiratory:  Positive for shortness of breath.    All other systems reviewed and are negative.      Objective   Vitals reviewed.   Constitutional:       Appearance: Healthy appearance. Not in distress.   Neck:      Vascular: No JVR. JVD normal.   Pulmonary:      Effort: Pulmonary effort is normal.      Breath sounds: Normal breath sounds. Rales at the left base  Chest:      Chest wall: Not tender to palpitation.   Cardiovascular:      Normal rate. Regular rhythm. Normal S1. Normal S2.       Murmurs: 3/6 systolic murmur      No gallop.  No click. No rub.   Pulses:     Intact distal pulses.   Edema:     Peripheral edema absent.   Abdominal:      General: Bowel sounds are normal.      Palpations: Abdomen is soft.      Tenderness: There is no abdominal tenderness.   Musculoskeletal: Normal range of motion.         General: No tenderness.   Skin:     General: Skin is warm and dry.   Neurological:      General: No focal deficit present.      Mental Status: Alert and oriented to person, place and time      Lab Review:   Lab Results   Component Value Date     2025    K 4.1 2025     2025    CO2 24 2025    BUN 27 (H) 2025    CREATININE 1.55 (H) 2025    GLUCOSE 148 (H) 2025    CALCIUM 9.0  07/01/2025     Lab Results   Component Value Date    WBC 9.5 07/01/2025    HGB 12.0 (L) 07/01/2025    HCT 37.2 (L) 07/01/2025    .3 (H) 07/01/2025     07/01/2025     Lab Results   Component Value Date    CHOL 124 06/18/2025    TRIG 176 (H) 06/18/2025    HDL 36 (L) 06/18/2025           Assessment/Plan   Mr. Devlin is a pleasant 85 year old  male with a past medical history significant for carotid artery stenosis s/p right CEA, PAD s/p kissing stent technique in the iliacs and into the distal abdominal aorta, hyperlipidemia, hypertension and CAD s/p CABG x 4 with heart catheterization 3/10/2021 showing stable native vessel CAD with widely patent LIMA-LAD and SVG-RCA and totally occluded SVG-OM1 and totally occluded SVG-OM2 with moderate aortic stenosis and normal LV systolic function. Echocardiogram 4/2025 showed an EF of 60-65% with moderate aortic stenosis.  He presents having lost 7 pounds with improvement in renal function after stopping losartan and allopurinol.  VS show mild hypertension. His biggest complaint remains shortness of breath with may be an anginal equivalent.  He was previously on Isosorbide (stopped due to GI bleed and hypotension in 2023 or 2024).  We would like to try this again.  He will be started on 30 mg daily. He will continue to hold ARB and allopurinol.  All other medication will remain unchanged.  He will followup in 1 month and will call with any interim questions or concerns.

## 2025-07-07 NOTE — PATIENT INSTRUCTIONS
Start isosorbide 30 mg daily    Continue lasix 40 mg    Continue all other medication    Return in 1 month

## 2025-07-13 DIAGNOSIS — I50.31 ACUTE DIASTOLIC CONGESTIVE HEART FAILURE: ICD-10-CM

## 2025-07-13 DIAGNOSIS — I21.A1 TYPE 2 ACUTE MYOCARDIAL INFARCTION (MULTI): ICD-10-CM

## 2025-07-14 RX ORDER — METOPROLOL SUCCINATE 100 MG/1
100 TABLET, EXTENDED RELEASE ORAL DAILY
Qty: 90 TABLET | Refills: 3 | Status: SHIPPED | OUTPATIENT
Start: 2025-07-14

## 2025-07-14 RX ORDER — POTASSIUM CHLORIDE 20 MEQ/1
20 TABLET, EXTENDED RELEASE ORAL DAILY
Qty: 90 TABLET | Refills: 2 | Status: SHIPPED | OUTPATIENT
Start: 2025-07-14

## 2025-07-15 ENCOUNTER — TELEPHONE (OUTPATIENT)
Dept: CARDIOLOGY | Facility: CLINIC | Age: 85
End: 2025-07-15
Payer: MEDICARE

## 2025-07-20 DIAGNOSIS — E78.00 PURE HYPERCHOLESTEROLEMIA: ICD-10-CM

## 2025-07-21 RX ORDER — EZETIMIBE 10 MG/1
10 TABLET ORAL DAILY
Qty: 90 TABLET | Refills: 1 | Status: SHIPPED | OUTPATIENT
Start: 2025-07-21

## 2025-07-22 ENCOUNTER — APPOINTMENT (OUTPATIENT)
Dept: PRIMARY CARE | Facility: CLINIC | Age: 85
End: 2025-07-22
Payer: MEDICARE

## 2025-07-22 ENCOUNTER — TELEPHONE (OUTPATIENT)
Dept: PRIMARY CARE | Facility: CLINIC | Age: 85
End: 2025-07-22

## 2025-07-22 VITALS
OXYGEN SATURATION: 97 % | HEIGHT: 66 IN | BODY MASS INDEX: 29.25 KG/M2 | WEIGHT: 182 LBS | HEART RATE: 54 BPM | SYSTOLIC BLOOD PRESSURE: 140 MMHG | DIASTOLIC BLOOD PRESSURE: 74 MMHG | TEMPERATURE: 98.2 F

## 2025-07-22 DIAGNOSIS — M1A.9XX0 CHRONIC GOUT WITHOUT TOPHUS, UNSPECIFIED CAUSE, UNSPECIFIED SITE: ICD-10-CM

## 2025-07-22 DIAGNOSIS — Z00.00 WELL ADULT EXAM: ICD-10-CM

## 2025-07-22 DIAGNOSIS — I10 ESSENTIAL (PRIMARY) HYPERTENSION: ICD-10-CM

## 2025-07-22 DIAGNOSIS — E11.59 TYPE 2 DIABETES MELLITUS WITH OTHER CIRCULATORY COMPLICATION, WITHOUT LONG-TERM CURRENT USE OF INSULIN: ICD-10-CM

## 2025-07-22 DIAGNOSIS — E78.00 PURE HYPERCHOLESTEROLEMIA, UNSPECIFIED: ICD-10-CM

## 2025-07-22 DIAGNOSIS — I35.0 NONRHEUMATIC AORTIC VALVE STENOSIS: ICD-10-CM

## 2025-07-22 DIAGNOSIS — I25.10 CAD IN NATIVE ARTERY: Primary | ICD-10-CM

## 2025-07-22 PROCEDURE — 1125F AMNT PAIN NOTED PAIN PRSNT: CPT | Performed by: FAMILY MEDICINE

## 2025-07-22 PROCEDURE — G0439 PPPS, SUBSEQ VISIT: HCPCS | Performed by: FAMILY MEDICINE

## 2025-07-22 PROCEDURE — 1170F FXNL STATUS ASSESSED: CPT | Performed by: FAMILY MEDICINE

## 2025-07-22 PROCEDURE — 3078F DIAST BP <80 MM HG: CPT | Performed by: FAMILY MEDICINE

## 2025-07-22 PROCEDURE — 1159F MED LIST DOCD IN RCRD: CPT | Performed by: FAMILY MEDICINE

## 2025-07-22 PROCEDURE — 99212 OFFICE O/P EST SF 10 MIN: CPT | Performed by: FAMILY MEDICINE

## 2025-07-22 PROCEDURE — 3077F SYST BP >= 140 MM HG: CPT | Performed by: FAMILY MEDICINE

## 2025-07-22 RX ORDER — AMLODIPINE BESYLATE 5 MG/1
1 TABLET ORAL
COMMUNITY
Start: 2025-07-11

## 2025-07-22 ASSESSMENT — ACTIVITIES OF DAILY LIVING (ADL)
DOING_HOUSEWORK: NEEDS ASSISTANCE
MANAGING_FINANCES: INDEPENDENT
TAKING_MEDICATION: INDEPENDENT
GROCERY_SHOPPING: INDEPENDENT
BATHING: INDEPENDENT
DRESSING: INDEPENDENT

## 2025-07-22 ASSESSMENT — ENCOUNTER SYMPTOMS
DEPRESSION: 0
OCCASIONAL FEELINGS OF UNSTEADINESS: 0
LOSS OF SENSATION IN FEET: 0

## 2025-07-22 ASSESSMENT — PAIN SCALES - GENERAL: PAINLEVEL_OUTOF10: 4

## 2025-07-22 NOTE — PROGRESS NOTES
Subjective   Reason for Visit: Fox Devlin is an 85 y.o. male here for a Medicare Wellness visit.     Past Medical, Surgical, and Family History reviewed and updated in chart.    Reviewed all medications by prescribing practitioner or clinical pharmacist (such as prescriptions, OTCs, herbal therapies and supplements) and documented in the medical record.    HPI    Patient Care Team:  Donald Price MD as PCP - General (Family Medicine)  Donald Price MD as Primary Care Provider  Maru De Santiago, RN as Nurse Navigator   He had a colonoscopy/EGD by Dr. Tate in 10/16 which showed diverticulosis and a polyp.   Gastrointestinal consultation in 2021 recommended no further colonoscopy screening exams.     2. FOX is seen today for follow-up of coronary artery disease.  He is S/P CABG on 3/20/15. He is followed by Dr. Yeboah at [facility]. He is on, ASA, Metoprolol,furosemide and Amlodipine. Also has aortic stenosis which is being followed by serial ultrasounds by the cardiologist.     3. FOX is seen today for gout.  He takes Allopurinol but this has been put on hold by cardiologist and has had no acute attacks in a long time.     4. FOX is seen today also for follow up of High cholesterol.  He was on Lovastatin 40 mg but it was changed to Repatha by cardiologist in 2020.  He is on rosuvastatin 5 mg and Zetia.  Recent LDL is 63.     5. FOX is here also for follow up of benign essential hypertension.  On meds through Dr. Yeboah.     6. FOX is seen today also for BPH.      7. FOX presents today also for follow up of back pain.  He is S/P surgical repair of spinal stenosis L3-L5 by dr. Mahan in 12/14 and again in 8/20 at [place].     8. FOX is seen also for follow up of Diabetes 2, non insulin requiring.  He is on no medication. Recent A1C is 6.3.    9.  He is here for follow up of GERD.  He is on pantoprazole.   Review of Systems  Denies headache, blurred vision, chest pain,  "shortness of breath, nausea or vomiting, change in bowel habits or leg pain or swelling.    Objective   Vitals:  /74   Pulse 54   Temp 36.8 °C (98.2 °F) (Temporal)   Ht 1.676 m (5' 6\")   Wt 82.6 kg (182 lb)   SpO2 97%   BMI 29.38 kg/m²       Physical Exam  General appearance: Vital signs have been reviewed.  Comfortable.  He is alert and oriented x3 and appears his stated age.The patient is cooperative with exam.  Head: Hair pattern reveals a normal pattern for patient's age and face shows no abnormalities.  Eyes: PERRLA x2, EOMI x2, conjunctive a and sclera are clear.  Ears: External bilateral ears with normal helix, tragus and earlobe.Bilateral canals are normal.Bilateral tympanic membranes are pearly gray and landmarks are well visualized.  Nose: Nasal mucosa both nostrils reveals no polyps, ulcerations or lesions.  Throat:Teeth are in good repair.  Posterior pharynx reveals no abnormalities.  Neck: Supple without lymphadenopathy, thyromegaly or carotid bruits.  Lungs:Clear to auscultation bilaterally with no wheezes, rales or rhonchi.  Cardiovascular: RRR without MRG.No carotid bruits.  Extremities without edema and pulses are intact.  Abdomen: Soft, NT, no masses, no hepatosplenomegaly.  Genitalia: No testicular masses.  No evidence of inguinal hernia.Nontender.  Rectal: No masses.  Prostate is normal in size and shape with no nodules. It is nontender.  Musculoskeletal: 5/5 and equal strength in bilateral upper and lower extremities.  Skin: Good turgor and without rashes.  Neurological: Good overall strength and no focal deficits.  Cranial nerves II through XII are grossly intact.  Psychiatric: Patient has appropriate judgment with good insight.  Mood is appropriate.    Assessment & Plan  CAD in native artery  Continue following with cardiologist.  Continue metoprolol, furosemide and amlodipine.  Follow-up with me in 6 months.         Type 2 diabetes mellitus with other circulatory complication, " without long-term current use of insulin  Keep off medication.  Continue cutting back on carbs in diet.  Recheck in 6 months.         Essential (primary) hypertension  Continue metoprolol, amlodipine and furosemide.  Continue following with cardiologist.  Follow-up with me in 6 months.         Chronic gout without tophus, unspecified cause, unspecified site  Continue off of allopurinol for now per cardiology.  If he has any flareups of gout he is to let me know.  Recheck in 6 months.         Nonrheumatic aortic valve stenosis  Continue monitoring with cardiologist.         Well adult exam  Anticipatory guidance given.  Recommended tetanus update through pharmacy.

## 2025-07-22 NOTE — ASSESSMENT & PLAN NOTE
Continue following with cardiologist.  Continue metoprolol, furosemide and amlodipine.  Follow-up with me in 6 months.

## 2025-07-22 NOTE — ASSESSMENT & PLAN NOTE
Continue off of allopurinol for now per cardiology.  If he has any flareups of gout he is to let me know.  Recheck in 6 months.

## 2025-07-22 NOTE — ASSESSMENT & PLAN NOTE
Continue metoprolol, amlodipine and furosemide.  Continue following with cardiologist.  Follow-up with me in 6 months.

## 2025-07-23 ENCOUNTER — TELEPHONE (OUTPATIENT)
Dept: PRIMARY CARE | Facility: CLINIC | Age: 85
End: 2025-07-23
Payer: MEDICARE

## 2025-07-23 NOTE — TELEPHONE ENCOUNTER
Please call and let him know that the only thing I recommended at this time was to get a tetanus shot update through his pharmacy.

## 2025-07-23 NOTE — TELEPHONE ENCOUNTER
Herminio is calling because he thought there was something he needed to do but cannot remember.  I looked in orders, I did see lab orders.  I am assuming that is for his next visit.  Can you call and let him know if there is anything you need him to do?

## 2025-08-04 ENCOUNTER — OFFICE VISIT (OUTPATIENT)
Dept: CARDIOLOGY | Facility: CLINIC | Age: 85
End: 2025-08-04
Payer: MEDICARE

## 2025-08-04 VITALS
BODY MASS INDEX: 31.29 KG/M2 | DIASTOLIC BLOOD PRESSURE: 65 MMHG | SYSTOLIC BLOOD PRESSURE: 145 MMHG | HEIGHT: 65 IN | OXYGEN SATURATION: 96 % | WEIGHT: 187.8 LBS | HEART RATE: 66 BPM

## 2025-08-04 DIAGNOSIS — I35.0 NONRHEUMATIC AORTIC VALVE STENOSIS: ICD-10-CM

## 2025-08-04 DIAGNOSIS — I25.10 CAD IN NATIVE ARTERY: Primary | ICD-10-CM

## 2025-08-04 PROCEDURE — 1160F RVW MEDS BY RX/DR IN RCRD: CPT | Performed by: NURSE PRACTITIONER

## 2025-08-04 PROCEDURE — 99212 OFFICE O/P EST SF 10 MIN: CPT

## 2025-08-04 PROCEDURE — 3078F DIAST BP <80 MM HG: CPT | Performed by: NURSE PRACTITIONER

## 2025-08-04 PROCEDURE — 1126F AMNT PAIN NOTED NONE PRSNT: CPT | Performed by: NURSE PRACTITIONER

## 2025-08-04 PROCEDURE — 1159F MED LIST DOCD IN RCRD: CPT | Performed by: NURSE PRACTITIONER

## 2025-08-04 PROCEDURE — 99214 OFFICE O/P EST MOD 30 MIN: CPT | Performed by: NURSE PRACTITIONER

## 2025-08-04 PROCEDURE — 3077F SYST BP >= 140 MM HG: CPT | Performed by: NURSE PRACTITIONER

## 2025-08-04 RX ORDER — AMLODIPINE BESYLATE 5 MG/1
10 TABLET ORAL
Qty: 60 TABLET | Refills: 3 | Status: SHIPPED | OUTPATIENT
Start: 2025-08-04 | End: 2025-08-04 | Stop reason: ALTCHOICE

## 2025-08-04 RX ORDER — RANOLAZINE 500 MG/1
500 TABLET, EXTENDED RELEASE ORAL 2 TIMES DAILY
Qty: 60 TABLET | Refills: 3 | Status: SHIPPED | OUTPATIENT
Start: 2025-08-04 | End: 2025-09-03

## 2025-08-04 RX ORDER — AMLODIPINE BESYLATE 10 MG/1
10 TABLET ORAL DAILY
Qty: 30 TABLET | Refills: 11 | Status: SHIPPED | OUTPATIENT
Start: 2025-08-04 | End: 2026-08-04

## 2025-08-04 RX ORDER — ASPIRIN 81 MG/1
81 TABLET ORAL DAILY
COMMUNITY

## 2025-08-04 ASSESSMENT — ENCOUNTER SYMPTOMS
SHORTNESS OF BREATH: 1
CONSTITUTIONAL NEGATIVE: 1
LOSS OF SENSATION IN FEET: 0
CARDIOVASCULAR NEGATIVE: 1
OCCASIONAL FEELINGS OF UNSTEADINESS: 0

## 2025-08-04 ASSESSMENT — PAIN SCALES - GENERAL: PAINLEVEL_OUTOF10: 0-NO PAIN

## 2025-08-04 NOTE — PATIENT INSTRUCTIONS
Start ranexa 500 mg twice daily    Continue all other medication    Call for concerns    Return in 1 month

## 2025-08-04 NOTE — PROGRESS NOTES
"Subjective   Herminio Devlin is a 85 y.o. male.    Chief Complaint:  Follow-up    HPI  Mr. Devlin is seen in collaboration with Dr. Yeboah for a 1 month followup appointment. He was prescribed Imdur however discontinued it due to \"kidney pain\".  He has continued all other medication.  He continues to complain of dyspnea with exertion however denies any chest pain. He tries to remain active and is able to perform most ADLs without limitation.     Review of Systems   Constitutional: Negative.   Cardiovascular: Negative.    Respiratory:  Positive for shortness of breath.    All other systems reviewed and are negative.      Objective   Vitals reviewed.   Constitutional:       Appearance: Healthy appearance. Not in distress.   Neck:      Vascular: No JVR. JVD normal.   Pulmonary:      Effort: Pulmonary effort is normal.      Breath sounds: Normal breath sounds.   Chest:      Chest wall: Not tender to palpitation.   Cardiovascular:      Normal rate. Regular rhythm. Normal S1. Normal S2.       Murmurs: 2/6 systolic murmur      No gallop.  No click. No rub.   Pulses:     Intact distal pulses.   Edema:     Peripheral edema absent.   Abdominal:      General: Bowel sounds are normal.      Palpations: Abdomen is soft.      Tenderness: There is no abdominal tenderness.   Musculoskeletal: Normal range of motion.         General: No tenderness.   Skin:     General: Skin is warm and dry.   Neurological:      General: No focal deficit present.      Mental Status: Alert and oriented to person, place and time      Lab Review:   Lab Results   Component Value Date     07/01/2025    K 4.1 07/01/2025     07/01/2025    CO2 24 07/01/2025    BUN 27 (H) 07/01/2025    CREATININE 1.55 (H) 07/01/2025    GLUCOSE 148 (H) 07/01/2025    CALCIUM 9.0 07/01/2025     Lab Results   Component Value Date    WBC 9.5 07/01/2025    HGB 12.0 (L) 07/01/2025    HCT 37.2 (L) 07/01/2025    .3 (H) 07/01/2025     07/01/2025     Lab Results "   Component Value Date    CHOL 124 06/18/2025    TRIG 176 (H) 06/18/2025    HDL 36 (L) 06/18/2025       Assessment/Plan   Mr. Devlin is a pleasant 85 year old  male with a past medical history significant for carotid artery stenosis s/p right CEA, PAD s/p kissing stent technique in the iliacs and into the distal abdominal aorta, hyperlipidemia, hypertension and CAD s/p CABG x 4 3/2015 with repeat heart catheterization 3/10/2021 showing stable native vessel CAD with widely patent LIMA-LAD and SVG-RCA and totally occluded SVG-OM1 and totally occluded SVG-OM2 and mild stable left main in stent-restenosis with mild-moderate aortic stenosis and normal LV systolic function. Echocardiogram 4/2025 showed an EF of 60-65% with moderate aortic stenosis. He presents having self discontinued Imdur. VS are stable. LDL 63. He continues to complain of shortness of breath. He may benefit from Ranexa given his mild to moderate AS as well as CAD. We will try Ranexa 500 mg twice daily and continue all other medication unchanged.  He will followup in another 1 month and call in the interim with concerns.

## 2026-01-23 ENCOUNTER — APPOINTMENT (OUTPATIENT)
Dept: PRIMARY CARE | Facility: CLINIC | Age: 86
End: 2026-01-23
Payer: MEDICARE